# Patient Record
Sex: FEMALE | Race: WHITE | NOT HISPANIC OR LATINO
[De-identification: names, ages, dates, MRNs, and addresses within clinical notes are randomized per-mention and may not be internally consistent; named-entity substitution may affect disease eponyms.]

---

## 2017-04-12 ENCOUNTER — APPOINTMENT (OUTPATIENT)
Dept: ELECTROPHYSIOLOGY | Facility: CLINIC | Age: 77
End: 2017-04-12

## 2017-04-12 VITALS
HEIGHT: 67 IN | HEART RATE: 73 BPM | WEIGHT: 204 LBS | BODY MASS INDEX: 32.02 KG/M2 | DIASTOLIC BLOOD PRESSURE: 91 MMHG | SYSTOLIC BLOOD PRESSURE: 144 MMHG

## 2017-07-18 ENCOUNTER — APPOINTMENT (OUTPATIENT)
Dept: ELECTROPHYSIOLOGY | Facility: CLINIC | Age: 77
End: 2017-07-18

## 2017-07-18 VITALS
BODY MASS INDEX: 32.18 KG/M2 | HEIGHT: 67 IN | WEIGHT: 205 LBS | SYSTOLIC BLOOD PRESSURE: 143 MMHG | DIASTOLIC BLOOD PRESSURE: 96 MMHG | HEART RATE: 77 BPM

## 2017-09-26 ENCOUNTER — APPOINTMENT (OUTPATIENT)
Dept: ELECTROPHYSIOLOGY | Facility: CLINIC | Age: 77
End: 2017-09-26
Payer: MEDICARE

## 2017-09-26 VITALS
WEIGHT: 205 LBS | HEIGHT: 67 IN | HEART RATE: 65 BPM | BODY MASS INDEX: 32.18 KG/M2 | DIASTOLIC BLOOD PRESSURE: 69 MMHG | SYSTOLIC BLOOD PRESSURE: 131 MMHG

## 2017-09-26 PROCEDURE — 93280 PM DEVICE PROGR EVAL DUAL: CPT

## 2017-10-12 ENCOUNTER — OUTPATIENT (OUTPATIENT)
Dept: OUTPATIENT SERVICES | Facility: HOSPITAL | Age: 77
LOS: 1 days | Discharge: HOME | End: 2017-10-12
Payer: MEDICARE

## 2017-10-12 VITALS
DIASTOLIC BLOOD PRESSURE: 67 MMHG | TEMPERATURE: 98 F | HEIGHT: 66.5 IN | SYSTOLIC BLOOD PRESSURE: 120 MMHG | RESPIRATION RATE: 18 BRPM | OXYGEN SATURATION: 96 % | WEIGHT: 218.04 LBS | HEART RATE: 74 BPM

## 2017-10-12 DIAGNOSIS — Z95.0 PRESENCE OF CARDIAC PACEMAKER: Chronic | ICD-10-CM

## 2017-10-12 DIAGNOSIS — Z45.018 ENCOUNTER FOR ADJUSTMENT AND MANAGEMENT OF OTHER PART OF CARDIAC PACEMAKER: ICD-10-CM

## 2017-10-12 DIAGNOSIS — I48.91 UNSPECIFIED ATRIAL FIBRILLATION: ICD-10-CM

## 2017-10-12 DIAGNOSIS — I10 ESSENTIAL (PRIMARY) HYPERTENSION: ICD-10-CM

## 2017-10-12 DIAGNOSIS — Z98.890 OTHER SPECIFIED POSTPROCEDURAL STATES: Chronic | ICD-10-CM

## 2017-10-12 DIAGNOSIS — I49.5 SICK SINUS SYNDROME: ICD-10-CM

## 2017-10-12 DIAGNOSIS — Z01.818 ENCOUNTER FOR OTHER PREPROCEDURAL EXAMINATION: ICD-10-CM

## 2017-10-12 LAB
ANION GAP SERPL CALC-SCNC: 7 MMOL/L — SIGNIFICANT CHANGE UP (ref 5–17)
BASOPHILS # BLD AUTO: 0.1 K/UL — SIGNIFICANT CHANGE UP (ref 0–0.2)
BASOPHILS NFR BLD AUTO: 1.5 % — SIGNIFICANT CHANGE UP (ref 0–2)
BUN SERPL-MCNC: 18 MG/DL — SIGNIFICANT CHANGE UP (ref 7–23)
CALCIUM SERPL-MCNC: 8.5 MG/DL — SIGNIFICANT CHANGE UP (ref 8.5–10.1)
CHLORIDE SERPL-SCNC: 105 MMOL/L — SIGNIFICANT CHANGE UP (ref 96–108)
CO2 SERPL-SCNC: 27 MMOL/L — SIGNIFICANT CHANGE UP (ref 22–31)
CREAT SERPL-MCNC: 1.03 MG/DL — SIGNIFICANT CHANGE UP (ref 0.5–1.3)
EOSINOPHIL # BLD AUTO: 0.1 K/UL — SIGNIFICANT CHANGE UP (ref 0–0.5)
EOSINOPHIL NFR BLD AUTO: 2 % — SIGNIFICANT CHANGE UP (ref 0–6)
GLUCOSE SERPL-MCNC: 209 MG/DL — HIGH (ref 70–99)
HCT VFR BLD CALC: 41.9 % — SIGNIFICANT CHANGE UP (ref 34.5–45)
HGB BLD-MCNC: 14 G/DL — SIGNIFICANT CHANGE UP (ref 11.5–15.5)
INR BLD: 2 RATIO — HIGH (ref 0.88–1.16)
LYMPHOCYTES # BLD AUTO: 1.3 K/UL — SIGNIFICANT CHANGE UP (ref 1–3.3)
LYMPHOCYTES # BLD AUTO: 23.6 % — SIGNIFICANT CHANGE UP (ref 13–44)
MCHC RBC-ENTMCNC: 30.8 PG — SIGNIFICANT CHANGE UP (ref 27–34)
MCHC RBC-ENTMCNC: 33.5 GM/DL — SIGNIFICANT CHANGE UP (ref 32–36)
MCV RBC AUTO: 92 FL — SIGNIFICANT CHANGE UP (ref 80–100)
MONOCYTES # BLD AUTO: 0.3 K/UL — SIGNIFICANT CHANGE UP (ref 0–0.9)
MONOCYTES NFR BLD AUTO: 6.1 % — SIGNIFICANT CHANGE UP (ref 2–14)
NEUTROPHILS # BLD AUTO: 3.8 K/UL — SIGNIFICANT CHANGE UP (ref 1.8–7.4)
NEUTROPHILS NFR BLD AUTO: 66.8 % — SIGNIFICANT CHANGE UP (ref 43–77)
PLATELET # BLD AUTO: 165 K/UL — SIGNIFICANT CHANGE UP (ref 150–400)
POTASSIUM SERPL-MCNC: 4 MMOL/L — SIGNIFICANT CHANGE UP (ref 3.5–5.3)
POTASSIUM SERPL-SCNC: 4 MMOL/L — SIGNIFICANT CHANGE UP (ref 3.5–5.3)
PROTHROM AB SERPL-ACNC: 21.9 SEC — HIGH (ref 9.8–12.7)
RBC # BLD: 4.55 M/UL — SIGNIFICANT CHANGE UP (ref 3.8–5.2)
RBC # FLD: 11.7 % — SIGNIFICANT CHANGE UP (ref 10.3–14.5)
SODIUM SERPL-SCNC: 139 MMOL/L — SIGNIFICANT CHANGE UP (ref 135–145)
WBC # BLD: 5.7 K/UL — SIGNIFICANT CHANGE UP (ref 3.8–10.5)
WBC # FLD AUTO: 5.7 K/UL — SIGNIFICANT CHANGE UP (ref 3.8–10.5)

## 2017-10-12 PROCEDURE — 71020: CPT | Mod: 26

## 2017-10-12 PROCEDURE — 93010 ELECTROCARDIOGRAM REPORT: CPT

## 2017-10-12 RX ORDER — DIGOXIN 250 MCG
1 TABLET ORAL
Qty: 0 | Refills: 0 | COMMUNITY

## 2017-10-12 NOTE — H&P PST ADULT - ASSESSMENT
77 years old female present to PST prior to PPM generator change with Dr. Rudy Castorena. Instructions as per cardiology.  Plan  1. Use E-Z sponge as directed  2. Use Mupirocin as directed.  3. CBC, BMP, PT/PTT/INR,  MRSA sent to lab  4. EKG and CXR done

## 2017-10-12 NOTE — H&P PST ADULT - HISTORY OF PRESENT ILLNESS
77 years old female Atrial Fibrillation. She had device placed in 2008. Device originally checked every 6 months. Presently interrogated every 2 months. Battery at the end of life. Planned Pacemaker generator change.

## 2017-10-12 NOTE — H&P PST ADULT - PMH
Chronic atrial fibrillation    Chronic midline low back pain without sciatica    Glaucoma of both eyes, unspecified glaucoma type    Hyperlipidemia, unspecified hyperlipidemia type    Hypertension, unspecified type    Hypothyroidism, unspecified type    Obesity, unspecified classification, unspecified obesity type, unspecified whether serious comorbidity present    Pacemaker at end of battery life

## 2017-10-13 LAB
MRSA PCR RESULT.: SIGNIFICANT CHANGE UP
S AUREUS DNA NOSE QL NAA+PROBE: SIGNIFICANT CHANGE UP

## 2017-10-20 ENCOUNTER — OUTPATIENT (OUTPATIENT)
Dept: OUTPATIENT SERVICES | Facility: HOSPITAL | Age: 77
LOS: 1 days | Discharge: HOME | End: 2017-10-20
Payer: MEDICARE

## 2017-10-20 VITALS
HEART RATE: 80 BPM | DIASTOLIC BLOOD PRESSURE: 60 MMHG | RESPIRATION RATE: 20 BRPM | HEIGHT: 66.5 IN | SYSTOLIC BLOOD PRESSURE: 126 MMHG | OXYGEN SATURATION: 97 % | TEMPERATURE: 98 F | WEIGHT: 207.9 LBS

## 2017-10-20 VITALS
HEART RATE: 78 BPM | OXYGEN SATURATION: 97 % | DIASTOLIC BLOOD PRESSURE: 75 MMHG | SYSTOLIC BLOOD PRESSURE: 127 MMHG | RESPIRATION RATE: 20 BRPM

## 2017-10-20 DIAGNOSIS — Z98.890 OTHER SPECIFIED POSTPROCEDURAL STATES: Chronic | ICD-10-CM

## 2017-10-20 DIAGNOSIS — Z45.018 ENCOUNTER FOR ADJUSTMENT AND MANAGEMENT OF OTHER PART OF CARDIAC PACEMAKER: ICD-10-CM

## 2017-10-20 DIAGNOSIS — I49.5 SICK SINUS SYNDROME: ICD-10-CM

## 2017-10-20 DIAGNOSIS — Z95.0 PRESENCE OF CARDIAC PACEMAKER: Chronic | ICD-10-CM

## 2017-10-20 DIAGNOSIS — I10 ESSENTIAL (PRIMARY) HYPERTENSION: ICD-10-CM

## 2017-10-20 DIAGNOSIS — I48.91 UNSPECIFIED ATRIAL FIBRILLATION: ICD-10-CM

## 2017-10-20 PROCEDURE — 33228 REMV&REPLC PM GEN DUAL LEAD: CPT

## 2017-10-20 RX ORDER — CEFAZOLIN SODIUM 1 G
2000 VIAL (EA) INJECTION ONCE
Qty: 0 | Refills: 0 | Status: COMPLETED | OUTPATIENT
Start: 2017-10-20 | End: 2017-10-20

## 2017-10-20 RX ADMIN — Medication 100 MILLIGRAM(S): at 10:42

## 2017-10-20 NOTE — PACU DISCHARGE NOTE - COMMENTS
Discharged home with family member. Pt. given written and oral discharge instructions. Pt.and family verbalize understanding of same.

## 2017-10-23 ENCOUNTER — APPOINTMENT (OUTPATIENT)
Dept: ELECTROPHYSIOLOGY | Facility: CLINIC | Age: 77
End: 2017-10-23
Payer: MEDICARE

## 2017-10-23 VITALS
WEIGHT: 205 LBS | BODY MASS INDEX: 32.56 KG/M2 | SYSTOLIC BLOOD PRESSURE: 137 MMHG | HEIGHT: 66.6 IN | DIASTOLIC BLOOD PRESSURE: 90 MMHG | HEART RATE: 66 BPM

## 2017-10-23 PROCEDURE — 99024 POSTOP FOLLOW-UP VISIT: CPT

## 2017-11-03 ENCOUNTER — APPOINTMENT (OUTPATIENT)
Dept: ELECTROPHYSIOLOGY | Facility: CLINIC | Age: 77
End: 2017-11-03
Payer: MEDICARE

## 2017-11-03 PROCEDURE — 99024 POSTOP FOLLOW-UP VISIT: CPT

## 2017-12-15 ENCOUNTER — APPOINTMENT (OUTPATIENT)
Dept: ELECTROPHYSIOLOGY | Facility: CLINIC | Age: 77
End: 2017-12-15
Payer: MEDICARE

## 2017-12-15 VITALS
DIASTOLIC BLOOD PRESSURE: 86 MMHG | BODY MASS INDEX: 32.95 KG/M2 | SYSTOLIC BLOOD PRESSURE: 156 MMHG | HEART RATE: 62 BPM | HEIGHT: 66 IN | WEIGHT: 205 LBS

## 2017-12-15 PROCEDURE — 99024 POSTOP FOLLOW-UP VISIT: CPT

## 2018-03-16 ENCOUNTER — APPOINTMENT (OUTPATIENT)
Dept: ELECTROPHYSIOLOGY | Facility: CLINIC | Age: 78
End: 2018-03-16
Payer: MEDICARE

## 2018-03-16 VITALS
BODY MASS INDEX: 31.8 KG/M2 | DIASTOLIC BLOOD PRESSURE: 91 MMHG | HEIGHT: 67.5 IN | WEIGHT: 205 LBS | SYSTOLIC BLOOD PRESSURE: 124 MMHG | HEART RATE: 75 BPM

## 2018-03-16 PROCEDURE — 93279 PRGRMG DEV EVAL PM/LDLS PM: CPT

## 2018-06-22 ENCOUNTER — APPOINTMENT (OUTPATIENT)
Dept: ELECTROPHYSIOLOGY | Facility: CLINIC | Age: 78
End: 2018-06-22
Payer: MEDICARE

## 2018-06-22 VITALS
WEIGHT: 205 LBS | BODY MASS INDEX: 31.8 KG/M2 | DIASTOLIC BLOOD PRESSURE: 77 MMHG | HEIGHT: 67.5 IN | HEART RATE: 58 BPM | SYSTOLIC BLOOD PRESSURE: 115 MMHG

## 2018-06-22 PROCEDURE — 93279 PRGRMG DEV EVAL PM/LDLS PM: CPT

## 2018-09-24 ENCOUNTER — APPOINTMENT (OUTPATIENT)
Dept: ELECTROPHYSIOLOGY | Facility: CLINIC | Age: 78
End: 2018-09-24
Payer: MEDICARE

## 2018-09-24 PROCEDURE — 93296 REM INTERROG EVL PM/IDS: CPT

## 2018-09-24 PROCEDURE — 93294 REM INTERROG EVL PM/LDLS PM: CPT

## 2019-01-07 ENCOUNTER — APPOINTMENT (OUTPATIENT)
Dept: ELECTROPHYSIOLOGY | Facility: CLINIC | Age: 79
End: 2019-01-07
Payer: MEDICARE

## 2019-01-07 PROCEDURE — 93294 REM INTERROG EVL PM/LDLS PM: CPT

## 2019-01-07 PROCEDURE — 93296 REM INTERROG EVL PM/IDS: CPT

## 2019-04-08 ENCOUNTER — APPOINTMENT (OUTPATIENT)
Dept: ELECTROPHYSIOLOGY | Facility: CLINIC | Age: 79
End: 2019-04-08
Payer: MEDICARE

## 2019-04-08 PROCEDURE — 93294 REM INTERROG EVL PM/LDLS PM: CPT

## 2019-04-08 PROCEDURE — 93296 REM INTERROG EVL PM/IDS: CPT

## 2019-06-28 ENCOUNTER — APPOINTMENT (OUTPATIENT)
Dept: ELECTROPHYSIOLOGY | Facility: CLINIC | Age: 79
End: 2019-06-28
Payer: MEDICARE

## 2019-06-28 VITALS
WEIGHT: 190 LBS | BODY MASS INDEX: 29.47 KG/M2 | HEART RATE: 60 BPM | HEIGHT: 67.5 IN | DIASTOLIC BLOOD PRESSURE: 80 MMHG | SYSTOLIC BLOOD PRESSURE: 132 MMHG

## 2019-06-28 PROBLEM — E78.5 HYPERLIPIDEMIA, UNSPECIFIED: Chronic | Status: ACTIVE | Noted: 2017-10-12

## 2019-06-28 PROBLEM — H40.9 UNSPECIFIED GLAUCOMA: Chronic | Status: ACTIVE | Noted: 2017-10-12

## 2019-06-28 PROBLEM — E03.9 HYPOTHYROIDISM, UNSPECIFIED: Chronic | Status: ACTIVE | Noted: 2017-10-12

## 2019-06-28 PROBLEM — I48.2 CHRONIC ATRIAL FIBRILLATION: Chronic | Status: ACTIVE | Noted: 2017-10-12

## 2019-06-28 PROBLEM — Z45.010 ENCOUNTER FOR CHECKING AND TESTING OF CARDIAC PACEMAKER PULSE GENERATOR [BATTERY]: Chronic | Status: ACTIVE | Noted: 2017-10-12

## 2019-06-28 PROBLEM — M54.5 LOW BACK PAIN: Chronic | Status: ACTIVE | Noted: 2017-10-12

## 2019-06-28 PROBLEM — E66.9 OBESITY, UNSPECIFIED: Chronic | Status: ACTIVE | Noted: 2017-10-12

## 2019-06-28 PROBLEM — I10 ESSENTIAL (PRIMARY) HYPERTENSION: Chronic | Status: ACTIVE | Noted: 2017-10-12

## 2019-06-28 PROCEDURE — 93279 PRGRMG DEV EVAL PM/LDLS PM: CPT

## 2019-10-01 ENCOUNTER — APPOINTMENT (OUTPATIENT)
Dept: ELECTROPHYSIOLOGY | Facility: CLINIC | Age: 79
End: 2019-10-01
Payer: MEDICARE

## 2019-10-01 PROCEDURE — 93294 REM INTERROG EVL PM/LDLS PM: CPT

## 2019-10-01 PROCEDURE — 93296 REM INTERROG EVL PM/IDS: CPT

## 2020-01-07 ENCOUNTER — APPOINTMENT (OUTPATIENT)
Dept: ELECTROPHYSIOLOGY | Facility: CLINIC | Age: 80
End: 2020-01-07
Payer: MEDICARE

## 2020-01-07 PROCEDURE — 93296 REM INTERROG EVL PM/IDS: CPT

## 2020-01-07 PROCEDURE — 93294 REM INTERROG EVL PM/LDLS PM: CPT

## 2020-04-14 ENCOUNTER — APPOINTMENT (OUTPATIENT)
Dept: ELECTROPHYSIOLOGY | Facility: CLINIC | Age: 80
End: 2020-04-14
Payer: MEDICARE

## 2020-04-14 PROCEDURE — 93294 REM INTERROG EVL PM/LDLS PM: CPT

## 2020-04-14 PROCEDURE — 93296 REM INTERROG EVL PM/IDS: CPT

## 2020-06-30 RX ORDER — DIGOXIN 125 UG/1
125 TABLET ORAL DAILY
Refills: 0 | Status: ACTIVE | COMMUNITY

## 2020-06-30 RX ORDER — LEVOTHYROXINE SODIUM 0.05 MG/1
50 TABLET ORAL DAILY
Refills: 0 | Status: ACTIVE | COMMUNITY

## 2020-06-30 RX ORDER — ASPIRIN 81 MG
81 TABLET, DELAYED RELEASE (ENTERIC COATED) ORAL DAILY
Refills: 0 | Status: ACTIVE | COMMUNITY

## 2020-06-30 RX ORDER — CARVEDILOL 25 MG/1
25 TABLET, FILM COATED ORAL TWICE DAILY
Refills: 0 | Status: ACTIVE | COMMUNITY

## 2020-06-30 RX ORDER — LISINOPRIL 10 MG/1
10 TABLET ORAL DAILY
Refills: 0 | Status: ACTIVE | COMMUNITY

## 2020-06-30 RX ORDER — FUROSEMIDE 20 MG/1
20 TABLET ORAL DAILY
Refills: 0 | Status: ACTIVE | COMMUNITY

## 2020-06-30 RX ORDER — ISOSORBIDE MONONITRATE 20 MG/1
TABLET ORAL
Refills: 0 | Status: ACTIVE | COMMUNITY

## 2020-06-30 RX ORDER — WARFARIN 3 MG/1
3 TABLET ORAL
Refills: 0 | Status: ACTIVE | COMMUNITY

## 2020-06-30 RX ORDER — SPIRONOLACTONE 25 MG/1
25 TABLET ORAL DAILY
Refills: 0 | Status: ACTIVE | COMMUNITY

## 2020-06-30 RX ORDER — CEPHALEXIN 500 MG/1
500 CAPSULE ORAL EVERY 8 HOURS
Qty: 2 | Refills: 0 | Status: DISCONTINUED | COMMUNITY
Start: 2017-10-20 | End: 2020-06-30

## 2020-06-30 RX ORDER — ATORVASTATIN CALCIUM 40 MG/1
40 TABLET, FILM COATED ORAL
Refills: 0 | Status: ACTIVE | COMMUNITY

## 2020-07-01 ENCOUNTER — APPOINTMENT (OUTPATIENT)
Dept: ELECTROPHYSIOLOGY | Facility: CLINIC | Age: 80
End: 2020-07-01
Payer: MEDICARE

## 2020-07-01 VITALS
HEART RATE: 78 BPM | OXYGEN SATURATION: 98 % | DIASTOLIC BLOOD PRESSURE: 83 MMHG | WEIGHT: 180 LBS | SYSTOLIC BLOOD PRESSURE: 139 MMHG | HEIGHT: 67 IN | BODY MASS INDEX: 28.25 KG/M2

## 2020-07-01 PROCEDURE — 93280 PM DEVICE PROGR EVAL DUAL: CPT

## 2020-09-24 ENCOUNTER — APPOINTMENT (OUTPATIENT)
Dept: ELECTROPHYSIOLOGY | Facility: CLINIC | Age: 80
End: 2020-09-24
Payer: MEDICARE

## 2020-09-24 PROCEDURE — 93294 REM INTERROG EVL PM/LDLS PM: CPT

## 2020-09-24 PROCEDURE — 93296 REM INTERROG EVL PM/IDS: CPT

## 2020-09-30 ENCOUNTER — APPOINTMENT (OUTPATIENT)
Dept: ELECTROPHYSIOLOGY | Facility: CLINIC | Age: 80
End: 2020-09-30

## 2021-01-12 ENCOUNTER — APPOINTMENT (OUTPATIENT)
Dept: ELECTROPHYSIOLOGY | Facility: CLINIC | Age: 81
End: 2021-01-12
Payer: MEDICARE

## 2021-01-12 PROCEDURE — 93294 REM INTERROG EVL PM/LDLS PM: CPT

## 2021-01-12 PROCEDURE — 93296 REM INTERROG EVL PM/IDS: CPT

## 2021-04-13 ENCOUNTER — APPOINTMENT (OUTPATIENT)
Dept: ELECTROPHYSIOLOGY | Facility: CLINIC | Age: 81
End: 2021-04-13
Payer: MEDICARE

## 2021-04-13 ENCOUNTER — NON-APPOINTMENT (OUTPATIENT)
Age: 81
End: 2021-04-13

## 2021-04-13 PROCEDURE — 93294 REM INTERROG EVL PM/LDLS PM: CPT

## 2021-04-13 PROCEDURE — 93296 REM INTERROG EVL PM/IDS: CPT

## 2021-07-01 ENCOUNTER — APPOINTMENT (OUTPATIENT)
Dept: ELECTROPHYSIOLOGY | Facility: CLINIC | Age: 81
End: 2021-07-01
Payer: MEDICARE

## 2021-07-01 VITALS
DIASTOLIC BLOOD PRESSURE: 83 MMHG | RESPIRATION RATE: 14 BRPM | HEIGHT: 67 IN | OXYGEN SATURATION: 99 % | SYSTOLIC BLOOD PRESSURE: 127 MMHG | WEIGHT: 166 LBS | BODY MASS INDEX: 26.06 KG/M2

## 2021-07-01 PROCEDURE — 93280 PM DEVICE PROGR EVAL DUAL: CPT

## 2021-10-03 ENCOUNTER — APPOINTMENT (OUTPATIENT)
Dept: ELECTROPHYSIOLOGY | Facility: CLINIC | Age: 81
End: 2021-10-03
Payer: MEDICARE

## 2021-10-04 ENCOUNTER — NON-APPOINTMENT (OUTPATIENT)
Age: 81
End: 2021-10-04

## 2021-10-04 PROCEDURE — 93296 REM INTERROG EVL PM/IDS: CPT

## 2021-10-04 PROCEDURE — 93294 REM INTERROG EVL PM/LDLS PM: CPT

## 2022-01-03 ENCOUNTER — APPOINTMENT (OUTPATIENT)
Dept: ELECTROPHYSIOLOGY | Facility: CLINIC | Age: 82
End: 2022-01-03
Payer: MEDICARE

## 2022-01-04 ENCOUNTER — NON-APPOINTMENT (OUTPATIENT)
Age: 82
End: 2022-01-04

## 2022-01-04 PROCEDURE — 93296 REM INTERROG EVL PM/IDS: CPT | Mod: NC

## 2022-01-04 PROCEDURE — 93294 REM INTERROG EVL PM/LDLS PM: CPT | Mod: NC

## 2022-01-28 NOTE — ASU PREOP CHECKLIST - HEART RATE (BEATS/MIN)
[Postmenopausal] : The patient is postmenopausal [Total Preg ___] : G[unfilled] [Live Births ___] : P[unfilled]  [Age At Live Birth ___] : Age at live birth: [unfilled] [Menarche Age ____] : age at menarche was [unfilled] [Menopause Age____] : age at menopause was [unfilled] [Definite ___ (Date)] : the last menstrual period was [unfilled] [History of Hormone Replacement Treatment] : has no history of hormone replacement treatment 80

## 2022-04-04 ENCOUNTER — FORM ENCOUNTER (OUTPATIENT)
Age: 82
End: 2022-04-04

## 2022-04-05 ENCOUNTER — NON-APPOINTMENT (OUTPATIENT)
Age: 82
End: 2022-04-05

## 2022-04-05 ENCOUNTER — APPOINTMENT (OUTPATIENT)
Dept: ELECTROPHYSIOLOGY | Facility: CLINIC | Age: 82
End: 2022-04-05
Payer: MEDICARE

## 2022-04-05 PROCEDURE — 93294 REM INTERROG EVL PM/LDLS PM: CPT

## 2022-04-05 PROCEDURE — 93296 REM INTERROG EVL PM/IDS: CPT

## 2022-07-01 ENCOUNTER — APPOINTMENT (OUTPATIENT)
Dept: ELECTROPHYSIOLOGY | Facility: CLINIC | Age: 82
End: 2022-07-01

## 2022-07-01 ENCOUNTER — NON-APPOINTMENT (OUTPATIENT)
Age: 82
End: 2022-07-01

## 2022-07-01 VITALS
DIASTOLIC BLOOD PRESSURE: 80 MMHG | OXYGEN SATURATION: 98 % | SYSTOLIC BLOOD PRESSURE: 120 MMHG | RESPIRATION RATE: 14 BRPM | HEIGHT: 67 IN | HEART RATE: 81 BPM | WEIGHT: 166 LBS | BODY MASS INDEX: 26.06 KG/M2

## 2022-07-01 DIAGNOSIS — I49.5 SICK SINUS SYNDROME: ICD-10-CM

## 2022-07-01 PROCEDURE — 93279 PRGRMG DEV EVAL PM/LDLS PM: CPT

## 2022-10-03 ENCOUNTER — APPOINTMENT (OUTPATIENT)
Dept: ELECTROPHYSIOLOGY | Facility: CLINIC | Age: 82
End: 2022-10-03

## 2022-10-04 ENCOUNTER — NON-APPOINTMENT (OUTPATIENT)
Age: 82
End: 2022-10-04

## 2022-10-04 PROCEDURE — 93294 REM INTERROG EVL PM/LDLS PM: CPT

## 2022-10-04 PROCEDURE — 93296 REM INTERROG EVL PM/IDS: CPT

## 2022-12-20 ENCOUNTER — FORM ENCOUNTER (OUTPATIENT)
Age: 82
End: 2022-12-20

## 2023-01-02 ENCOUNTER — APPOINTMENT (OUTPATIENT)
Dept: ELECTROPHYSIOLOGY | Facility: CLINIC | Age: 83
End: 2023-01-02
Payer: MEDICARE

## 2023-01-03 ENCOUNTER — NON-APPOINTMENT (OUTPATIENT)
Age: 83
End: 2023-01-03

## 2023-01-03 PROCEDURE — 93294 REM INTERROG EVL PM/LDLS PM: CPT

## 2023-01-03 PROCEDURE — 93296 REM INTERROG EVL PM/IDS: CPT

## 2023-03-02 ENCOUNTER — FORM ENCOUNTER (OUTPATIENT)
Age: 83
End: 2023-03-02

## 2023-04-03 ENCOUNTER — APPOINTMENT (OUTPATIENT)
Dept: ELECTROPHYSIOLOGY | Facility: CLINIC | Age: 83
End: 2023-04-03
Payer: MEDICARE

## 2023-04-04 ENCOUNTER — NON-APPOINTMENT (OUTPATIENT)
Age: 83
End: 2023-04-04

## 2023-04-04 PROCEDURE — 93296 REM INTERROG EVL PM/IDS: CPT

## 2023-04-04 PROCEDURE — 93294 REM INTERROG EVL PM/LDLS PM: CPT

## 2023-07-06 ENCOUNTER — APPOINTMENT (OUTPATIENT)
Dept: ELECTROPHYSIOLOGY | Facility: CLINIC | Age: 83
End: 2023-07-06
Payer: MEDICARE

## 2023-07-06 ENCOUNTER — NON-APPOINTMENT (OUTPATIENT)
Age: 83
End: 2023-07-06

## 2023-07-06 VITALS
SYSTOLIC BLOOD PRESSURE: 110 MMHG | DIASTOLIC BLOOD PRESSURE: 72 MMHG | RESPIRATION RATE: 16 BRPM | HEART RATE: 78 BPM | OXYGEN SATURATION: 100 %

## 2023-07-06 DIAGNOSIS — I48.20 CHRONIC ATRIAL FIBRILLATION, UNSP: ICD-10-CM

## 2023-07-06 DIAGNOSIS — Z95.0 PRESENCE OF CARDIAC PACEMAKER: ICD-10-CM

## 2023-07-06 PROCEDURE — 93279 PRGRMG DEV EVAL PM/LDLS PM: CPT

## 2023-07-06 NOTE — H&P PST ADULT - NSALCOHOLAMT_GEN_A_CORE_SD
Pt BIB EMS after Pt passed out while playing Basketball. Pt denied Hitting head, she sat down before loss of consciousness. Pt sleepy upon arrival.  Answering to verbal questions. No PHX. NKA. IUTD.
1-2 drinks

## 2023-10-04 ENCOUNTER — APPOINTMENT (OUTPATIENT)
Dept: ELECTROPHYSIOLOGY | Facility: CLINIC | Age: 83
End: 2023-10-04
Payer: MEDICARE

## 2023-10-05 ENCOUNTER — NON-APPOINTMENT (OUTPATIENT)
Age: 83
End: 2023-10-05

## 2023-10-05 PROCEDURE — 93294 REM INTERROG EVL PM/LDLS PM: CPT

## 2023-10-05 PROCEDURE — 93296 REM INTERROG EVL PM/IDS: CPT

## 2023-10-10 ENCOUNTER — INPATIENT (INPATIENT)
Facility: HOSPITAL | Age: 83
LOS: 12 days | Discharge: NURSING, REHAB OR LONG-TERM CARE | DRG: 871 | End: 2023-10-23
Attending: FAMILY MEDICINE | Admitting: INTERNAL MEDICINE
Payer: MEDICARE

## 2023-10-10 VITALS
RESPIRATION RATE: 18 BRPM | SYSTOLIC BLOOD PRESSURE: 55 MMHG | TEMPERATURE: 98 F | HEART RATE: 46 BPM | OXYGEN SATURATION: 96 % | WEIGHT: 169.98 LBS | HEIGHT: 64 IN | DIASTOLIC BLOOD PRESSURE: 40 MMHG

## 2023-10-10 DIAGNOSIS — Z98.890 OTHER SPECIFIED POSTPROCEDURAL STATES: Chronic | ICD-10-CM

## 2023-10-10 DIAGNOSIS — F03.90 UNSPECIFIED DEMENTIA WITHOUT BEHAVIORAL DISTURBANCE: ICD-10-CM

## 2023-10-10 DIAGNOSIS — K55.9 VASCULAR DISORDER OF INTESTINE, UNSPECIFIED: ICD-10-CM

## 2023-10-10 DIAGNOSIS — Z95.0 PRESENCE OF CARDIAC PACEMAKER: Chronic | ICD-10-CM

## 2023-10-10 LAB
ADD ON TEST-SPECIMEN IN LAB: SIGNIFICANT CHANGE UP
ALBUMIN SERPL ELPH-MCNC: 2.6 G/DL — LOW (ref 3.3–5)
ALP SERPL-CCNC: 82 U/L — SIGNIFICANT CHANGE UP (ref 40–120)
ALT FLD-CCNC: 36 U/L — SIGNIFICANT CHANGE UP (ref 12–78)
ANION GAP SERPL CALC-SCNC: 12 MMOL/L — SIGNIFICANT CHANGE UP (ref 5–17)
APPEARANCE UR: ABNORMAL
APTT BLD: 45.6 SEC — HIGH (ref 24.5–35.6)
AST SERPL-CCNC: 37 U/L — SIGNIFICANT CHANGE UP (ref 15–37)
BACTERIA # UR AUTO: ABNORMAL /HPF
BASOPHILS # BLD AUTO: 0 K/UL — SIGNIFICANT CHANGE UP (ref 0–0.2)
BASOPHILS NFR BLD AUTO: 0 % — SIGNIFICANT CHANGE UP (ref 0–2)
BILIRUB SERPL-MCNC: 0.8 MG/DL — SIGNIFICANT CHANGE UP (ref 0.2–1.2)
BILIRUB UR-MCNC: ABNORMAL
BUN SERPL-MCNC: 60 MG/DL — HIGH (ref 7–23)
CALCIUM SERPL-MCNC: 8.7 MG/DL — SIGNIFICANT CHANGE UP (ref 8.5–10.1)
CAST: 24 /LPF — HIGH (ref 0–4)
CHLORIDE SERPL-SCNC: 96 MMOL/L — SIGNIFICANT CHANGE UP (ref 96–108)
CO2 SERPL-SCNC: 19 MMOL/L — LOW (ref 22–31)
COLOR SPEC: SIGNIFICANT CHANGE UP
CREAT SERPL-MCNC: 1.78 MG/DL — HIGH (ref 0.5–1.3)
DIFF PNL FLD: ABNORMAL
DIGOXIN SERPL-MCNC: 1.08 NG/ML — SIGNIFICANT CHANGE UP (ref 0.8–2)
EGFR: 28 ML/MIN/1.73M2 — LOW
EOSINOPHIL # BLD AUTO: 0 K/UL — SIGNIFICANT CHANGE UP (ref 0–0.5)
EOSINOPHIL NFR BLD AUTO: 0 % — SIGNIFICANT CHANGE UP (ref 0–6)
GLUCOSE BLDC GLUCOMTR-MCNC: 118 MG/DL — HIGH (ref 70–99)
GLUCOSE SERPL-MCNC: 142 MG/DL — HIGH (ref 70–99)
GLUCOSE UR QL: NEGATIVE MG/DL — SIGNIFICANT CHANGE UP
GRAN CASTS # UR COMP ASSIST: PRESENT
HCT VFR BLD CALC: 35.5 % — SIGNIFICANT CHANGE UP (ref 34.5–45)
HGB BLD-MCNC: 12.3 G/DL — SIGNIFICANT CHANGE UP (ref 11.5–15.5)
HYALINE CASTS # UR AUTO: PRESENT
INR BLD: 13.05 RATIO — CRITICAL HIGH (ref 0.85–1.18)
KETONES UR-MCNC: ABNORMAL MG/DL
LACTATE SERPL-SCNC: 1.5 MMOL/L — SIGNIFICANT CHANGE UP (ref 0.7–2)
LACTATE SERPL-SCNC: 3.1 MMOL/L — HIGH (ref 0.7–2)
LEUKOCYTE ESTERASE UR-ACNC: ABNORMAL
LIDOCAIN IGE QN: 15 U/L — SIGNIFICANT CHANGE UP (ref 13–75)
LYMPHOCYTES # BLD AUTO: 0.76 K/UL — LOW (ref 1–3.3)
LYMPHOCYTES # BLD AUTO: 5 % — LOW (ref 13–44)
MANUAL SMEAR VERIFICATION: SIGNIFICANT CHANGE UP
MCHC RBC-ENTMCNC: 31.4 PG — SIGNIFICANT CHANGE UP (ref 27–34)
MCHC RBC-ENTMCNC: 34.6 GM/DL — SIGNIFICANT CHANGE UP (ref 32–36)
MCV RBC AUTO: 90.6 FL — SIGNIFICANT CHANGE UP (ref 80–100)
METAMYELOCYTES # FLD: 5 % — HIGH (ref 0–0)
MONOCYTES # BLD AUTO: 0.91 K/UL — HIGH (ref 0–0.9)
MONOCYTES NFR BLD AUTO: 6 % — SIGNIFICANT CHANGE UP (ref 2–14)
NEUTROPHILS # BLD AUTO: 12.8 K/UL — HIGH (ref 1.8–7.4)
NEUTROPHILS NFR BLD AUTO: 66 % — SIGNIFICANT CHANGE UP (ref 43–77)
NEUTS BAND # BLD: 18 % — HIGH (ref 0–8)
NITRITE UR-MCNC: NEGATIVE — SIGNIFICANT CHANGE UP
NRBC # BLD: 0 /100 — SIGNIFICANT CHANGE UP (ref 0–0)
NRBC # BLD: SIGNIFICANT CHANGE UP /100 WBCS (ref 0–0)
OVALOCYTES BLD QL SMEAR: SLIGHT — SIGNIFICANT CHANGE UP
PH UR: 5.5 — SIGNIFICANT CHANGE UP (ref 5–8)
PLAT MORPH BLD: NORMAL — SIGNIFICANT CHANGE UP
PLATELET # BLD AUTO: 351 K/UL — SIGNIFICANT CHANGE UP (ref 150–400)
POIKILOCYTOSIS BLD QL AUTO: SLIGHT — SIGNIFICANT CHANGE UP
POTASSIUM SERPL-MCNC: 4.1 MMOL/L — SIGNIFICANT CHANGE UP (ref 3.5–5.3)
POTASSIUM SERPL-SCNC: 4.1 MMOL/L — SIGNIFICANT CHANGE UP (ref 3.5–5.3)
PROT SERPL-MCNC: 5.7 GM/DL — LOW (ref 6–8.3)
PROT UR-MCNC: 30 MG/DL
PROTHROM AB SERPL-ACNC: 136.8 SEC — HIGH (ref 9.5–13)
RAPID RVP RESULT: DETECTED
RBC # BLD: 3.92 M/UL — SIGNIFICANT CHANGE UP (ref 3.8–5.2)
RBC # FLD: 13.3 % — SIGNIFICANT CHANGE UP (ref 10.3–14.5)
RBC BLD AUTO: ABNORMAL
RBC CASTS # UR COMP ASSIST: 12 /HPF — HIGH (ref 0–4)
SARS-COV-2 RNA SPEC QL NAA+PROBE: DETECTED
SCHISTOCYTES BLD QL AUTO: SLIGHT — SIGNIFICANT CHANGE UP
SODIUM SERPL-SCNC: 127 MMOL/L — LOW (ref 135–145)
SP GR SPEC: 1.02 — SIGNIFICANT CHANGE UP (ref 1–1.03)
SQUAMOUS # UR AUTO: 1 /HPF — SIGNIFICANT CHANGE UP (ref 0–5)
TROPONIN I, HIGH SENSITIVITY RESULT: 31.92 NG/L — SIGNIFICANT CHANGE UP
UROBILINOGEN FLD QL: 1 MG/DL — SIGNIFICANT CHANGE UP (ref 0.2–1)
WBC # BLD: 15.24 K/UL — HIGH (ref 3.8–10.5)
WBC # FLD AUTO: 15.24 K/UL — HIGH (ref 3.8–10.5)
WBC UR QL: >998 /HPF — HIGH (ref 0–5)

## 2023-10-10 PROCEDURE — 71275 CT ANGIOGRAPHY CHEST: CPT

## 2023-10-10 PROCEDURE — 80053 COMPREHEN METABOLIC PANEL: CPT

## 2023-10-10 PROCEDURE — 85610 PROTHROMBIN TIME: CPT

## 2023-10-10 PROCEDURE — 93971 EXTREMITY STUDY: CPT | Mod: LT

## 2023-10-10 PROCEDURE — 83735 ASSAY OF MAGNESIUM: CPT

## 2023-10-10 PROCEDURE — 70450 CT HEAD/BRAIN W/O DYE: CPT | Mod: 26,MA

## 2023-10-10 PROCEDURE — 83605 ASSAY OF LACTIC ACID: CPT

## 2023-10-10 PROCEDURE — 85025 COMPLETE CBC W/AUTO DIFF WBC: CPT

## 2023-10-10 PROCEDURE — 36415 COLL VENOUS BLD VENIPUNCTURE: CPT

## 2023-10-10 PROCEDURE — 99223 1ST HOSP IP/OBS HIGH 75: CPT

## 2023-10-10 PROCEDURE — 85027 COMPLETE CBC AUTOMATED: CPT

## 2023-10-10 PROCEDURE — 93010 ELECTROCARDIOGRAM REPORT: CPT

## 2023-10-10 PROCEDURE — 97116 GAIT TRAINING THERAPY: CPT | Mod: GP

## 2023-10-10 PROCEDURE — 99291 CRITICAL CARE FIRST HOUR: CPT

## 2023-10-10 PROCEDURE — 74177 CT ABD & PELVIS W/CONTRAST: CPT

## 2023-10-10 PROCEDURE — 71260 CT THORAX DX C+: CPT | Mod: 26,MA

## 2023-10-10 PROCEDURE — 84100 ASSAY OF PHOSPHORUS: CPT

## 2023-10-10 PROCEDURE — 71045 X-RAY EXAM CHEST 1 VIEW: CPT

## 2023-10-10 PROCEDURE — 97163 PT EVAL HIGH COMPLEX 45 MIN: CPT | Mod: GP

## 2023-10-10 PROCEDURE — 85730 THROMBOPLASTIN TIME PARTIAL: CPT

## 2023-10-10 PROCEDURE — 97166 OT EVAL MOD COMPLEX 45 MIN: CPT | Mod: GO

## 2023-10-10 PROCEDURE — 87635 SARS-COV-2 COVID-19 AMP PRB: CPT

## 2023-10-10 PROCEDURE — 97535 SELF CARE MNGMENT TRAINING: CPT | Mod: GO

## 2023-10-10 PROCEDURE — 74177 CT ABD & PELVIS W/CONTRAST: CPT | Mod: 26,MA

## 2023-10-10 PROCEDURE — 87507 IADNA-DNA/RNA PROBE TQ 12-25: CPT

## 2023-10-10 PROCEDURE — 97530 THERAPEUTIC ACTIVITIES: CPT | Mod: GP

## 2023-10-10 PROCEDURE — 97110 THERAPEUTIC EXERCISES: CPT | Mod: GO

## 2023-10-10 PROCEDURE — 80048 BASIC METABOLIC PNL TOTAL CA: CPT

## 2023-10-10 PROCEDURE — 87493 C DIFF AMPLIFIED PROBE: CPT

## 2023-10-10 PROCEDURE — 72125 CT NECK SPINE W/O DYE: CPT | Mod: 26,MA

## 2023-10-10 PROCEDURE — 71045 X-RAY EXAM CHEST 1 VIEW: CPT | Mod: 26

## 2023-10-10 RX ORDER — CIPROFLOXACIN LACTATE 400MG/40ML
400 VIAL (ML) INTRAVENOUS EVERY 12 HOURS
Refills: 0 | Status: DISCONTINUED | OUTPATIENT
Start: 2023-10-10 | End: 2023-10-10

## 2023-10-10 RX ORDER — DIGOXIN 250 MCG
1 TABLET ORAL
Refills: 0 | DISCHARGE

## 2023-10-10 RX ORDER — ATORVASTATIN CALCIUM 80 MG/1
1 TABLET, FILM COATED ORAL
Refills: 0 | DISCHARGE

## 2023-10-10 RX ORDER — LEVOTHYROXINE SODIUM 125 MCG
50 TABLET ORAL DAILY
Refills: 0 | Status: DISCONTINUED | OUTPATIENT
Start: 2023-10-10 | End: 2023-10-23

## 2023-10-10 RX ORDER — QUETIAPINE FUMARATE 200 MG/1
12.5 TABLET, FILM COATED ORAL AT BEDTIME
Refills: 0 | Status: DISCONTINUED | OUTPATIENT
Start: 2023-10-10 | End: 2023-10-23

## 2023-10-10 RX ORDER — PIPERACILLIN AND TAZOBACTAM 4; .5 G/20ML; G/20ML
3.38 INJECTION, POWDER, LYOPHILIZED, FOR SOLUTION INTRAVENOUS EVERY 8 HOURS
Refills: 0 | Status: COMPLETED | OUTPATIENT
Start: 2023-10-10 | End: 2023-10-17

## 2023-10-10 RX ORDER — MEMANTINE HYDROCHLORIDE 10 MG/1
10 TABLET ORAL
Refills: 0 | Status: DISCONTINUED | OUTPATIENT
Start: 2023-10-10 | End: 2023-10-23

## 2023-10-10 RX ORDER — ASPIRIN/CALCIUM CARB/MAGNESIUM 324 MG
1 TABLET ORAL
Qty: 0 | Refills: 0 | DISCHARGE

## 2023-10-10 RX ORDER — METRONIDAZOLE 500 MG
500 TABLET ORAL ONCE
Refills: 0 | Status: COMPLETED | OUTPATIENT
Start: 2023-10-10 | End: 2023-10-10

## 2023-10-10 RX ORDER — SPIRONOLACTONE 25 MG/1
1 TABLET, FILM COATED ORAL
Refills: 0 | DISCHARGE

## 2023-10-10 RX ORDER — LEVOTHYROXINE SODIUM 125 MCG
1 TABLET ORAL
Qty: 0 | Refills: 0 | DISCHARGE

## 2023-10-10 RX ORDER — LATANOPROST 0.05 MG/ML
1 SOLUTION/ DROPS OPHTHALMIC; TOPICAL
Refills: 0 | DISCHARGE

## 2023-10-10 RX ORDER — ATORVASTATIN CALCIUM 80 MG/1
1 TABLET, FILM COATED ORAL
Qty: 0 | Refills: 0 | DISCHARGE

## 2023-10-10 RX ORDER — MEMANTINE HYDROCHLORIDE 10 MG/1
1 TABLET ORAL
Refills: 0 | DISCHARGE

## 2023-10-10 RX ORDER — SODIUM CHLORIDE 9 MG/ML
1000 INJECTION INTRAMUSCULAR; INTRAVENOUS; SUBCUTANEOUS ONCE
Refills: 0 | Status: DISCONTINUED | OUTPATIENT
Start: 2023-10-10 | End: 2023-10-18

## 2023-10-10 RX ORDER — PHYTONADIONE (VIT K1) 5 MG
5 TABLET ORAL ONCE
Refills: 0 | Status: COMPLETED | OUTPATIENT
Start: 2023-10-10 | End: 2023-10-10

## 2023-10-10 RX ORDER — LISINOPRIL 2.5 MG/1
1 TABLET ORAL
Refills: 0 | DISCHARGE

## 2023-10-10 RX ORDER — LATANOPROST 0.05 MG/ML
1 SOLUTION/ DROPS OPHTHALMIC; TOPICAL AT BEDTIME
Refills: 0 | Status: DISCONTINUED | OUTPATIENT
Start: 2023-10-10 | End: 2023-10-23

## 2023-10-10 RX ORDER — SODIUM CHLORIDE 9 MG/ML
1000 INJECTION INTRAMUSCULAR; INTRAVENOUS; SUBCUTANEOUS
Refills: 0 | Status: DISCONTINUED | OUTPATIENT
Start: 2023-10-10 | End: 2023-10-17

## 2023-10-10 RX ORDER — DIGOXIN 250 MCG
125 TABLET ORAL EVERY OTHER DAY
Refills: 0 | Status: DISCONTINUED | OUTPATIENT
Start: 2023-10-10 | End: 2023-10-23

## 2023-10-10 RX ORDER — CARVEDILOL PHOSPHATE 80 MG/1
1 CAPSULE, EXTENDED RELEASE ORAL
Qty: 0 | Refills: 0 | DISCHARGE

## 2023-10-10 RX ORDER — LANOLIN ALCOHOL/MO/W.PET/CERES
3 CREAM (GRAM) TOPICAL AT BEDTIME
Refills: 0 | Status: DISCONTINUED | OUTPATIENT
Start: 2023-10-10 | End: 2023-10-23

## 2023-10-10 RX ORDER — ACETAMINOPHEN 500 MG
2 TABLET ORAL
Refills: 0 | DISCHARGE

## 2023-10-10 RX ORDER — DONEPEZIL HYDROCHLORIDE 10 MG/1
1 TABLET, FILM COATED ORAL
Refills: 0 | DISCHARGE

## 2023-10-10 RX ORDER — SODIUM CHLORIDE 9 MG/ML
2500 INJECTION INTRAMUSCULAR; INTRAVENOUS; SUBCUTANEOUS ONCE
Refills: 0 | Status: COMPLETED | OUTPATIENT
Start: 2023-10-10 | End: 2023-10-10

## 2023-10-10 RX ORDER — FUROSEMIDE 40 MG
1 TABLET ORAL
Refills: 0 | DISCHARGE

## 2023-10-10 RX ORDER — LISINOPRIL 2.5 MG/1
1 TABLET ORAL
Qty: 0 | Refills: 0 | DISCHARGE

## 2023-10-10 RX ORDER — ONDANSETRON 8 MG/1
4 TABLET, FILM COATED ORAL EVERY 8 HOURS
Refills: 0 | Status: DISCONTINUED | OUTPATIENT
Start: 2023-10-10 | End: 2023-10-23

## 2023-10-10 RX ORDER — INFLUENZA VIRUS VACCINE 15; 15; 15; 15 UG/.5ML; UG/.5ML; UG/.5ML; UG/.5ML
0.7 SUSPENSION INTRAMUSCULAR ONCE
Refills: 0 | Status: DISCONTINUED | OUTPATIENT
Start: 2023-10-10 | End: 2023-10-23

## 2023-10-10 RX ORDER — DONEPEZIL HYDROCHLORIDE 10 MG/1
10 TABLET, FILM COATED ORAL AT BEDTIME
Refills: 0 | Status: DISCONTINUED | OUTPATIENT
Start: 2023-10-10 | End: 2023-10-23

## 2023-10-10 RX ORDER — PIPERACILLIN AND TAZOBACTAM 4; .5 G/20ML; G/20ML
3.38 INJECTION, POWDER, LYOPHILIZED, FOR SOLUTION INTRAVENOUS ONCE
Refills: 0 | Status: COMPLETED | OUTPATIENT
Start: 2023-10-10 | End: 2023-10-10

## 2023-10-10 RX ORDER — WARFARIN SODIUM 2.5 MG/1
3.5 TABLET ORAL
Qty: 0 | Refills: 0 | DISCHARGE

## 2023-10-10 RX ORDER — ATORVASTATIN CALCIUM 80 MG/1
40 TABLET, FILM COATED ORAL AT BEDTIME
Refills: 0 | Status: DISCONTINUED | OUTPATIENT
Start: 2023-10-10 | End: 2023-10-23

## 2023-10-10 RX ORDER — CIPROFLOXACIN LACTATE 400MG/40ML
400 VIAL (ML) INTRAVENOUS ONCE
Refills: 0 | Status: COMPLETED | OUTPATIENT
Start: 2023-10-10 | End: 2023-10-10

## 2023-10-10 RX ORDER — ACETAMINOPHEN 500 MG
650 TABLET ORAL EVERY 6 HOURS
Refills: 0 | Status: DISCONTINUED | OUTPATIENT
Start: 2023-10-10 | End: 2023-10-23

## 2023-10-10 RX ORDER — SPIRONOLACTONE 25 MG/1
1 TABLET, FILM COATED ORAL
Qty: 0 | Refills: 0 | DISCHARGE

## 2023-10-10 RX ORDER — METRONIDAZOLE 500 MG
500 TABLET ORAL EVERY 8 HOURS
Refills: 0 | Status: DISCONTINUED | OUTPATIENT
Start: 2023-10-10 | End: 2023-10-10

## 2023-10-10 RX ORDER — SODIUM CHLORIDE 9 MG/ML
2400 INJECTION, SOLUTION INTRAVENOUS ONCE
Refills: 0 | Status: DISCONTINUED | OUTPATIENT
Start: 2023-10-10 | End: 2023-10-10

## 2023-10-10 RX ADMIN — Medication 200 MILLIGRAM(S): at 14:13

## 2023-10-10 RX ADMIN — Medication 5 MILLIGRAM(S): at 12:32

## 2023-10-10 RX ADMIN — LATANOPROST 1 DROP(S): 0.05 SOLUTION/ DROPS OPHTHALMIC; TOPICAL at 22:54

## 2023-10-10 RX ADMIN — SODIUM CHLORIDE 100 MILLILITER(S): 9 INJECTION INTRAMUSCULAR; INTRAVENOUS; SUBCUTANEOUS at 17:29

## 2023-10-10 RX ADMIN — PIPERACILLIN AND TAZOBACTAM 200 GRAM(S): 4; .5 INJECTION, POWDER, LYOPHILIZED, FOR SOLUTION INTRAVENOUS at 12:29

## 2023-10-10 RX ADMIN — SODIUM CHLORIDE 2500 MILLILITER(S): 9 INJECTION INTRAMUSCULAR; INTRAVENOUS; SUBCUTANEOUS at 11:00

## 2023-10-10 RX ADMIN — PIPERACILLIN AND TAZOBACTAM 25 GRAM(S): 4; .5 INJECTION, POWDER, LYOPHILIZED, FOR SOLUTION INTRAVENOUS at 22:54

## 2023-10-10 RX ADMIN — QUETIAPINE FUMARATE 12.5 MILLIGRAM(S): 200 TABLET, FILM COATED ORAL at 22:54

## 2023-10-10 RX ADMIN — DONEPEZIL HYDROCHLORIDE 10 MILLIGRAM(S): 10 TABLET, FILM COATED ORAL at 22:54

## 2023-10-10 RX ADMIN — Medication 100 MILLIGRAM(S): at 15:48

## 2023-10-10 RX ADMIN — ATORVASTATIN CALCIUM 40 MILLIGRAM(S): 80 TABLET, FILM COATED ORAL at 22:54

## 2023-10-10 RX ADMIN — MEMANTINE HYDROCHLORIDE 10 MILLIGRAM(S): 10 TABLET ORAL at 22:54

## 2023-10-10 NOTE — ED ADULT NURSE REASSESSMENT NOTE - NS ED NURSE REASSESS COMMENT FT1
pt alert to name and . VS documented. Pt medicated with Cipro as per MD orders. Purwick placed on pt due to incontinence. Safety maintained, bed at lowest position, call bell within reach. Contact and airborne precautions in place.

## 2023-10-10 NOTE — GOALS OF CARE CONVERSATION - ADVANCED CARE PLANNING - CONVERSATION DETAILS
pt is 83 years old with chronic a-fib (on warfarin), PPM, HTN, HLD, umbilical hernia (sister says she was not a surgical candidate to have that fixed) and dementia who lives with sister and is partially dependent for ADLs (can feed self, brush teeth but does not bathe or drive. Needs meds laid out for her.     Pt's current condition including ischemic bowel, hypotension (improved after IVF resuscitation), and UTI explained to Nona and Alondra (both of whom are RNs).     Seen by surgery who recommends conservative care for now and will reevaluate should condition deteriorate.     Options discussed:  conservative medical management such as supportive care, IVF and antibiotics  aggressive resuscitative measures including need for surgery, pressors and need for CPR/intubation    Family strongly leaning towards conservative measures then comfort care if she deteriorates or does not improve. However, pt remains full code as of now.    Palliative care consulted for ongoing discussion and clarification/further determination of goals of care.

## 2023-10-10 NOTE — CONSULT NOTE ADULT - ASSESSMENT
82 yo female with multiple comorbidities comes to the ED due to weakness and elevated INR secondary to diarrhea.   CT abdomen concerning for ischemic colitis of the right colon, with pneumatosis intestinalis, at the moment the patient is not a surgical candidate due to elevated INR, electrolyte abnormalities and hypotension.  The right colon is not a typical location for ischemic colitis, pneumatosis can be also related to infectious process, at the moment the pt has not peritoneal signs and requires conservative management.    Recommendations:    Conservative management  Supportive care  IV antibiotics  Colorectal surgery will follow and reassess if patient will need surgical intervention  Recommend goal of care discussion with family    Case discussed with Dr. Nader Martino

## 2023-10-10 NOTE — ED PROVIDER NOTE - PROGRESS NOTE DETAILS
Malcolm Son for ED attending, Dr. Guillermo: Pt sister at bedside, reports that pt has had intermittent diarrhea for the past few days which is not abnormal for her, they went to move patient and pt was too weak to stand, was lowered to ground, no head trauma, did not fall. She also reports that pt had nurse visit today and was found to have elevated INR on POC testing and pt had vitamin K ordered for her. Malcolm Son for ED attending, Dr. Guillermo: ICU consulted, PA at bedside [facility]: per ICU- okay for floor; endorsed to Dr. Alvarez for admission.

## 2023-10-10 NOTE — H&P ADULT - HISTORY OF PRESENT ILLNESS
82 y/o female with PMHx of dementia, chronic AFIB on coumadin, s/p PPM, HTN, HLD, glaucoma, hypothyroid who presented to  with CC of weakness, diarrhea.  History is obtained from patient's family.  Patient lives at home with her sister-- Kathy.  Kathy was out of town last few days but as per family, patient has been declining.  She wasn't eating as much, having some diarrhea.  She had her INR checked which was elevated last week and her coumadin dosing was adjusted.  Today, her sister tried getting her up OOB to clean her up after having diarrhea and she came weak and had near syncope and they lowered her to the ground.  No fall/ trauma.  They brought her to the Er for further evaluation.  In the ER, leonarda found to have hypotension with inital SBP in 50's.  Elevated lactate 3.1.  Patient given 2.5 L NSS.  CT concerning for ischemic colitis-- right colon.  Surgery was consulted who recommended conservative management for now and close f/u exams.  Patient denies abd pain.  Patient also tested positive for COVID-- sister denies sx other than weakness/ diarrhea.  Patient also found to have UTI with postiive UA.  Patient pancultured and started on cipro/ flagyl.  Also given 1 dose zosyn.  Patient seen by ICU with septic shock      PAST MEDICAL & SURGICAL HISTORY:  Chronic atrial fibrillation  Hypertension, unspecified type  Hyperlipidemia, unspecified hyperlipidemia type  Pacemaker at end of battery life  Glaucoma of both eyes, unspecified glaucoma type  Chronic midline low back pain without sciatica  Hypothyroidism, unspecified type  Obesity, unspecified classification, unspecified obesity type, unspecified whether serious comorbidity present  S/P dilatation and curettage 1970s  Artificial cardiac pacemaker 2008        FAMILY HISTORY:  No pertinent family history in first degree relatives      Social History:      Allergies:  No Known Allergies   84 y/o female with PMHx of dementia, chronic AFIB on coumadin, s/p PPM, HTN, HLD, glaucoma, hypothyroid who presented to  with CC of weakness, diarrhea.  History is obtained from patient's family.  Patient lives at home with her sister-- Kathy.  Kathy was out of town last few days but as per family, patient has been declining.  She wasn't eating as much, having some diarrhea.  She had her INR checked which was elevated last week and her coumadin dosing was adjusted.  Today, her sister tried getting her up OOB to clean her up after having diarrhea and she came weak and had near syncope and they lowered her to the ground.  No fall/ trauma.  They brought her to the Er for further evaluation.  In the ER, leonarda found to have hypotension with inital SBP in 50's.  Elevated lactate 3.1.  Patient given 2.5 L NSS.  CT concerning for ischemic colitis-- right colon.  Surgery was consulted who recommended conservative management for now and close f/u exams.  Patient denies abd pain.  Patient also tested positive for COVID-- sister denies sx other than weakness/ diarrhea.  Patient also found to have UTI with postiive UA.  Patient pancultured and started on cipro/ flagyl.  Also given 1 dose zosyn.  Patient seen by ICU with septic shock      PAST MEDICAL & SURGICAL HISTORY:  Chronic atrial fibrillation  Hypertension, unspecified type  Hyperlipidemia, unspecified hyperlipidemia type  Pacemaker at end of battery life  Glaucoma of both eyes, unspecified glaucoma type  Chronic midline low back pain without sciatica  Hypothyroidism, unspecified type  Obesity, unspecified classification, unspecified obesity type, unspecified whether serious comorbidity present  S/P dilatation and curettage 1970s  Artificial cardiac pacemaker 2008        FAMILY HISTORY:  No pertinent family history in first degree relatives      Social History:    Lives at home with sister.    No smoking/ etoh use.        Allergies:  No Known Allergies   84 y/o female with PMHx of dementia, chronic AFIB on coumadin, s/p PPM, HTN, HLD, glaucoma, hypothyroid who presented to  with CC of weakness, diarrhea.  History is obtained from patient's family.  Patient lives at home with her sister-- Kathy.  Kathy was out of town last few days but as per family, patient has been declining.  She wasn't eating as much, having some diarrhea.  She had her INR checked which was elevated last week and her coumadin dosing was adjusted.  Today, her sister tried getting her up OOB to clean her up after having diarrhea and she came weak and had near syncope and they lowered her to the ground.  No fall/ trauma.  They brought her to the Er for further evaluation.  In the ER, leonarda found to have hypotension with inital SBP in 50's.  Elevated lactate 3.1.  Patient given 2.5 L NSS.  CT concerning for ischemic colitis-- right colon.  Surgery was consulted who recommended conservative management for now and close f/u exams.  Patient denies abd pain.  Patient also tested positive for COVID-- sister denies sx other than weakness/ diarrhea.  Patient also found to have UTI with postiive UA.  Patient pancultured and started on cipro/ flagyl.  Also given 1 dose zosyn.  Patient seen by ICU with septic shock-- stated okay for floors.        PAST MEDICAL & SURGICAL HISTORY:  Chronic atrial fibrillation  Hypertension, unspecified type  Hyperlipidemia, unspecified hyperlipidemia type  Pacemaker at end of battery life  Glaucoma of both eyes, unspecified glaucoma type  Chronic midline low back pain without sciatica  Hypothyroidism, unspecified type  Obesity, unspecified classification, unspecified obesity type, unspecified whether serious comorbidity present  S/P dilatation and curettage 1970s  Artificial cardiac pacemaker 2008        FAMILY HISTORY:  No pertinent family history in first degree relatives      Social History:    Lives at home with sister.    No smoking/ etoh use.        Allergies:  No Known Allergies

## 2023-10-10 NOTE — PHARMACOTHERAPY INTERVENTION NOTE - COMMENTS
Medication history complete, reviewed medication with list provided by patients family and confirmed DrFirst.

## 2023-10-10 NOTE — CONSULT NOTE ADULT - ASSESSMENT
Pt is a 83y old Female with hx of chronic a-fib (on warfarin), PPM (? at end of life per chart), HTN, HLD, umbilical hernia (sister says she was not a surgical candidate to have that fixed) and dementia    Process of Care  --Reviewed dx/treatment problems and alignment with Goals of Care    Physical Aspects of Care  --Pain  patient denies at this time  c/w current managment    --Bowel Regimen  denies constipation  risk for constipation d/t immobility  daily dulcolax    --Dyspnea  No SOB at this time  comfortable and in NAD  + for covid    --Nausea Vomiting  denies    --Weakness  PT as tolerated     Psychological and Psychiatric Aspects of Care:   --Greif/Bereavment: emotional support provided  --Hx of psychiatric dx: none  -Pastoral Care Available PRN     Social Aspects of Care  -SW involved     Cultural Aspects  -Primary Language: English    Goals of Care:     We discussed Palliative Care team being a supportive team when a patient has ongoing illnesses.  We also discussed that it is not an end of life care service, but can help navigate symptoms and emotional support throughout their hospital stay here.    Ethical and Legal Aspects:  NA    Capacity- patient does not have capacity     HCP/Surrogate: HCP on one content naming Alondra with alternate Wero Pastor     Code Status- full code   MOLST-  Dispo Plan-    Discussed With: Dr. Salmon coordinated with attending and SW and RN     Time Spent: 90 minutes including the care, coordination and counseling of this patient, 50% of which was spent coordinating and counseling.

## 2023-10-10 NOTE — H&P ADULT - ASSESSMENT
82 y/o female with PMHx of dementia, chronic AFIB on coumadin, s/p PPM, HTN, HLD, glaucoma, hypothyroid who presented to  with CC of weakness, diarrhea and near syncope at home. Patient admitted for ischemic colitis, UTI, septic shock and COVID.        #Septic Shock:    Sources of infection UTI/ COVID/ ischemic colitis.    Admit to tele.    F/u pancultures.    S/p cipro/ flagyl/ zosyn in ER.    Cont zosyn pending cultures.    Suspect shock more related to UTI/ ischemic colitis.    Trend lactate and WBC.      #Ischemic Colitis:    Right colon on CT.    Appreciate surgical eval-- no plans for OR for now.    Trend labs/ lactate.    NPO except meds for now.    Distended on exam but no significant tenderness on exam.    Serial abd exams.    GI eval.      #UTI:    F/u cultures.    Zosyn as above.      #Coagulopathy:    Secondary to couadin.    INR 15.    S/p vitamin K 5 PO in ER.   Repeat labs in am.    No bleeding.      #SHANA:    60/1.8.    Suspect all prerenal.    Patient was on lasix at home.    S/p 2.5 L in ER.    Only 35 cc of urine in bladder.    1 L NSS x 1 now.    NSS @ 100.      #COVID:    PAtient with sx of weakness/ diarrhea but could also be explained by colitis/ UTI.    Trend.    No SOB/ cough/ fever.    Supportive care for now.      #Diarrhea:    Check gi pcr/ cdiff.    May be related to ischemic colitis/ COVID.      #Dementia:    Cont aricept/ namenda.    CO on floors.    Seroquel PRN.      #Afib on coumadin:    AS above.  Hold coumadin.    Cont dig-- check level.    Hold coreg with hypotension.      #HTN:    Hold ACE/ Lasix/ Spironolactone with hypotension.      #Hypothyroid:    Cont synthroid.      #HLD:    Cont statin.      #DVT Proph:  coagulopathic.      #Advanced directives:  as above.  DNR/ DNI.  No pressors.  No central line.

## 2023-10-10 NOTE — PATIENT PROFILE ADULT - FALL HARM RISK - HARM RISK INTERVENTIONS
Assistance with ambulation/Assistance OOB with selected safe patient handling equipment/Communicate Risk of Fall with Harm to all staff/Discuss with provider need for PT consult/Monitor for mental status changes/Monitor gait and stability/Move patient closer to nurses' station/Provide patient with walking aids - walker, cane, crutches/Reinforce activity limits and safety measures with patient and family/Reorient to person, place and time as needed/Tailored Fall Risk Interventions/Toileting schedule using arm’s reach rule for commode and bathroom/Use of alarms - bed, chair and/or voice tab/Visual Cue: Yellow wristband and red socks/Bed in lowest position, wheels locked, appropriate side rails in place/Call bell, personal items and telephone in reach/Instruct patient to call for assistance before getting out of bed or chair/Non-slip footwear when patient is out of bed/[place] to call system/Physically safe environment - no spills, clutter or unnecessary equipment/Purposeful Proactive Rounding/Room/bathroom lighting operational, light cord in reach

## 2023-10-10 NOTE — H&P ADULT - NSHPLABSRESULTS_GEN_ALL_CORE
12.3   15.24 )-----------( 351      ( 10 Oct 2023 11:03 )             35.5     10-10    127<L>  |  96  |  60<H>  ----------------------------<  142<H>  4.1   |  19<L>  |  1.78<H>    Ca    8.7      10 Oct 2023 11:03    TPro  5.7<L>  /  Alb  2.6<L>  /  TBili  0.8  /  DBili  x   /  AST  37  /  ALT  36  /  AlkPhos  82  10-10    CAPILLARY BLOOD GLUCOSE      POCT Blood Glucose.: 118 mg/dL (10 Oct 2023 11:31)    PT/INR - ( 10 Oct 2023 11:03 )   PT: 136.8 sec;   INR: 13.05 ratio         PTT - ( 10 Oct 2023 11:03 )  PTT:45.6 sec  Urinalysis Basic - ( 10 Oct 2023 11:03 )    Color: Dark Yellow / Appearance: Turbid / S.025 / pH: x  Gluc: 142 mg/dL / Ketone: Trace mg/dL  / Bili: Small / Urobili: 1.0 mg/dL   Blood: x / Protein: 30 mg/dL / Nitrite: Negative   Leuk Esterase: Large / RBC: 12 /HPF / WBC >998 /HPF   Sq Epi: x / Non Sq Epi: 1 /HPF / Bacteria: Many /HPF 12.3   15.24 )-----------( 351      ( 10 Oct 2023 11:03 )             35.5     10-10    127<L>  |  96  |  60<H>  ----------------------------<  142<H>  4.1   |  19<L>  |  1.78<H>    Ca    8.7      10 Oct 2023 11:03    TPro  5.7<L>  /  Alb  2.6<L>  /  TBili  0.8  /  DBili  x   /  AST  37  /  ALT  36  /  AlkPhos  82  10-10    CAPILLARY BLOOD GLUCOSE      POCT Blood Glucose.: 118 mg/dL (10 Oct 2023 11:31)    PT/INR - ( 10 Oct 2023 11:03 )   PT: 136.8 sec;   INR: 13.05 ratio         PTT - ( 10 Oct 2023 11:03 )  PTT:45.6 sec  Urinalysis Basic - ( 10 Oct 2023 11:03 )    Color: Dark Yellow / Appearance: Turbid / S.025 / pH: x  Gluc: 142 mg/dL / Ketone: Trace mg/dL  / Bili: Small / Urobili: 1.0 mg/dL   Blood: x / Protein: 30 mg/dL / Nitrite: Negative   Leuk Esterase: Large / RBC: 12 /HPF / WBC >998 /HPF   Sq Epi: x / Non Sq Epi: 1 /HPF / Bacteria: Many /HPF    < from: CT Abdomen and Pelvis w/ IV Cont (10.10.23 @ 12:13) >      IMPRESSION:  1.  Fluid-filled ascending colon pneumatosis, suspicious for acute   ischemic colitis.  2.  Diffusely distended small bowel loops likely representing a reactive   ileus. No transition point to suggest a small bowel obstruction.  3.  Small volume ascites, possibly reactive.    Findings of ischemic colitis were discussed with Dr. CALIXTO THOMPSON   10/10/2023 12:59 PM Mauricio Neal with read back confirmation.    < end of copied text >

## 2023-10-10 NOTE — ED PROVIDER NOTE - CARE PLAN
1 Principal Discharge DX:	Ischemic colitis  Secondary Diagnosis:	2019 novel coronavirus disease (COVID-19)

## 2023-10-10 NOTE — ED PROVIDER NOTE - NSICDXPASTMEDICALHX_GEN_ALL_CORE_FT
PAST MEDICAL HISTORY:  Chronic atrial fibrillation     Chronic midline low back pain without sciatica     Glaucoma of both eyes, unspecified glaucoma type     Hyperlipidemia, unspecified hyperlipidemia type     Hypertension, unspecified type     Hypothyroidism, unspecified type     Obesity, unspecified classification, unspecified obesity type, unspecified whether serious comorbidity present     Pacemaker at end of battery life

## 2023-10-10 NOTE — ED PROVIDER NOTE - OBJECTIVE STATEMENT
84 y/o female with PMHx of a-fib s/p PPM presents to the ED with generalized weakness. Per EMS pt was at home and had near syncopal episode, pt did not fall to ground, EMS also reports pt is confused at baseline. Pt hypotensive upon arrival to the ED and evaluated in trauma bay, Pt is confused upon evaluation, hypotensive however is awake and alert.

## 2023-10-10 NOTE — H&P ADULT - NSHPPHYSICALEXAM_GEN_ALL_CORE
PEx  T(C): 36.8 (10-10-23 @ 15:40), Max: 37.1 (10-10-23 @ 11:05)  HR: 88 (10-10-23 @ 17:06) (46 - 94)  BP: 122/74 (10-10-23 @ 17:06) (55/40 - 122/74)  RR: 19 (10-10-23 @ 17:06) (17 - 26)  SpO2: 98% (10-10-23 @ 17:06) (92% - 100%)  Wt(kg): --  General:     Well appearing, well nourished in no distress, no identifying marks , scars, or tattoos.  Skin: no rash or prominent lesions  Head: normocephalic, atraumatic     Sinuses: non-tender  Nose: no external lesions, mucosa non-inflamed, septum and turbinates normal  Throat: no erythema, exudates or lesions.  Neck: Supple without lymphadenopathy. Thyroid no thyromegaly, no palpable thyroid nodules, no palpable nodules or masses, carotid arteries no bruits.   Breasts: No palpable masses or lesions.  Heart: RRR, no murmur or gallop.  Normal S1, S2.  No S3, S4.   Lungs: CTA bilaterally, no wheezes, rhonchi, rales.  Breathing unlabored.   Chest wall: Normal insp   Abdomen:  Soft, NT/ND, normal bowel sounds, no HSM, no masses.  No peritoneal signs.   Back: spine normal without deformity or tenderness.  Normal ROM   : Exam normal.  no inguinal hernias.  Extremities: No deformities, clubbing, cyanosis, or edema.  Musculoskeletal: Normal gait and station. No decreased range of motion, instability, atrophy or abnormal strength or tone in the head, neck, spine, ribs, pelvis or extremities.   Neurologic: CN 2-12 normal. Sensation to pain, touch and proprioception normal. DTRs normal in upper and lower extremities. No pathologic reflexes.  Motor normal.  Psychiatric: Oriented X3, intact recent and remote memory, judgement and insight, normal mood and affect. PEx  T(C): 36.8 (10-10-23 @ 15:40), Max: 37.1 (10-10-23 @ 11:05)  HR: 88 (10-10-23 @ 17:06) (46 - 94)  BP: 122/74 (10-10-23 @ 17:06) (55/40 - 122/74)  RR: 19 (10-10-23 @ 17:06) (17 - 26)  SpO2: 98% (10-10-23 @ 17:06) (92% - 100%)    General:   NAD.  weak appearing.  pale.    Skin: no rash or prominent lesions  Head: normocephalic, atraumatic     Sinuses: non-tender  Nose: no external lesions, mucosa non-inflamed, septum and turbinates normal  Throat: no erythema, exudates or lesions.  Neck: Supple without lymphadenopathy. Thyroid no thyromegaly, no palpable thyroid nodules, no palpable nodules or masses, carotid arteries no bruits.   Breasts: No palpable masses or lesions.  Heart: RRR, no murmur or gallop.  Normal S1, S2.  No S3, S4.   Lungs: CTA bilaterally, no wheezes, rhonchi, rales.  Breathing unlabored.   Chest wall: Normal insp   Abdomen:  distended.  large umbilical hernia.    Back: spine normal without deformity or tenderness.  Normal ROM   : Exam normal.  no inguinal hernias.  Extremities: No deformities, clubbing, cyanosis, or edema.  Musculoskeletal: Normal gait and station. No decreased range of motion, instability, atrophy or abnormal strength or tone in the head, neck, spine, ribs, pelvis or extremities.   Neurologic: CN 2-12 normal. Sensation to pain, touch and proprioception normal. DTRs normal in upper and lower extremities. No pathologic reflexes.  Motor normal.  Psychiatric: confused.

## 2023-10-10 NOTE — CONSULT NOTE ADULT - ASSESSMENT
83 year old female with PMH chronic a-fib (on warfarin), PPM (? at end of life per chart), HTN, HLD, umbilical hernia (sister says she was not a surgical candidate to have that fixed) and dementia. Pt with poor appetite and diarrhea x a few days. Today was weak and unsteady, no fall/helped to floor by sister. A/w SHANA, UTI, supratherapeutic INR, and ischemic colitis as seen on CT. Hypotension (now resolved).      Pt hemodynamically stable at this time, no need for ICU admission.  palliative care consult placed  continue conservative medical management for now including abx and IVF  hold warfarin (vitamin K x 1 given in ED, trend INR)   pt remains full code for now but pt either a poor/high risk or not a candidate for surgical intervention. Should she deteriorate speak to family as they are leaning towards DNR/DNI and comfort measures if not improving.  if she is not made DNR PPM should be interrogated as previous chart mentions battery at end of life  remainder of plan per ED/primary team  above as d/w Wilver Ricci (ICU attending) and Eagle (ED attending) 83 year old female with PMH chronic a-fib (on warfarin), PPM (? at end of life per chart), HTN, HLD, umbilical hernia (sister says she was not a surgical candidate to have that fixed) and dementia. Pt with poor appetite and diarrhea x a few days. Today was weak and unsteady, no fall/helped to floor by sister. A/w SHANA, UTI, supratherapeutic INR, and ischemic colitis as seen on CT. Hypotension (now resolved).      Pt hemodynamically stable at this time, no need for ICU admission.  palliative care consult placed  continue conservative medical management for now including abx and IVF  hold warfarin (vitamin K x 1 given in ED, trend INR)   pt high risk for decompensation but remains full code for now. She is either a poor/high risk or not a candidate for surgical intervention. Should she deteriorate speak to family as they are leaning away from resuscitative measure such as pressors and towards DNR/DNI and comfort measures if not improving.  if she is not made DNR PPM should be interrogated as previous chart mentions battery at end of life  remainder of plan per ED/primary team  above as d/w Wilver Ricci (ICU attending) and Eagle (ED attending) 83 year old female with PMH chronic a-fib (on warfarin), PPM (? at end of life per chart), HTN, HLD, umbilical hernia (sister says she was not a surgical candidate to have that fixed) and dementia. Pt with poor appetite and diarrhea x a few days. Today was weak and unsteady, no fall/helped to floor by sister. A/w SHANA, UTI, supratherapeutic INR, and ischemic colitis as seen on CT. Covid + (surveillance cx done in ED, appears asymptomatic). Hypotension (now resolved).      Pt hemodynamically stable at this time, no need for ICU admission.  palliative care consult placed  continue conservative medical management for now including abx and IVF  hold warfarin (vitamin K x 1 given in ED, trend INR)   pt high risk for decompensation but remains full code for now. She is either a poor/high risk or not a candidate for surgical intervention. Should she deteriorate speak to family as they are leaning away from resuscitative measure such as pressors and towards DNR/DNI and comfort measures if not improving.  if she is not made DNR PPM should be interrogated as previous chart mentions battery at end of life  remainder of plan per ED/primary team  above as d/w Wilver Ricci (ICU attending) and Eagle (ED attending)

## 2023-10-10 NOTE — ED ADULT NURSE REASSESSMENT NOTE - NS ED NURSE REASSESS COMMENT FT1
pt awake, oriented 1-2. Abd soflty distended, with abd hernia noted. Pt has not voided spontaneously s/p IVF, bladder scanned for 34ml. Dr Alvarez aware with orders for 1LNSx 2 hrs. No BM noted. Pt restless, pulled off o2 monitor and found sitting up at end of bed with legs off stretcher, pt immediately repositoned in stretcher. Dr Sheikh aware with order for CO for floor.

## 2023-10-10 NOTE — ED ADULT NURSE NOTE - OBJECTIVE STATEMENT
Pt is 82yo F, presents to the ED with generalized weakness. EMS reports pts is confused at baseline. As per EMS, pt was found at home and had a near syncopal episode. pt did not fall to ground. Upon arrival to ED, pt hypotensive with BP of 55/40, soiled in feces and asking why she is at hospital. Upon arrival, pt evaluated, labs drawn and sent to lab. BGL of 118. 2L of NS bolus started. BP improved with 2L of NS bolus. Cardiac monitor in place, afib with 95bpm.  PMHx of donnyfib Pt is 84yo F, presents to the ED with generalized weakness. EMS reports pts is confused at baseline. As per EMS, pt was found at home and had a near syncopal episode. pt did not fall to ground. Upon arrival to ED, pt hypotensive with BP of 55/40, soiled in feces and asking why she is at hospital. Upon arrival, pt evaluated, labs drawn and sent to lab. BGL of 118. 2L of NS bolus started. BP improved with 2L of NS bolus. Cardiac monitor in place, afib with 95bpm.  PMHx of a-fib  Upon assessment, pt has large LLQ umbilical hernia. Skin intact.

## 2023-10-10 NOTE — ED ADULT NURSE NOTE - NS ED NURSE REPORT GIVEN DT
Patient tested positive for COVID on 11/18/2022 with symptom of sore throat. She currently denies any s/s of COVID.
10-Oct-2023 17:40

## 2023-10-10 NOTE — ED ADULT TRIAGE NOTE - CHIEF COMPLAINT QUOTE
Pt. BIBEMS from home c/o weakness and near syncopal episode. EMS reports pt. confused at baseline, oriented to self. Family was walking with pt. when she almost collapsed, did not fall, did not hit her head, was lowered to ground and EMS called. Pt. currently oriented to self. Pt. hypotensive in triage, Dr Guillermo notified, pt. brought directly to trauma.

## 2023-10-10 NOTE — ED PROVIDER NOTE - CLINICAL SUMMARY MEDICAL DECISION MAKING FREE TEXT BOX
advanced aged with generalized weakness, hypotension, differentials include but not limited to anemia, dehydration, infectious etiology, screening EKG, CXR, labs, UA, CT 82 yo female with generalized weakness, hypotension, differential dx include but not limited to anemia, dehydration, infectious etiology, screening EKG, CXR, labs, UA, CT

## 2023-10-10 NOTE — H&P ADULT - CONVERSATION DETAILS
Discussion with patient's niece Alondra and patient's sister Wero.    Discussed goals over care.    Discussed all aggressive treatments.    Decision for DNR/ DNI.    No trial of BIPAP.    No central line.    No pressors.    Okay for IVF and antibitoics.

## 2023-10-10 NOTE — H&P ADULT - NSHPREVIEWOFSYSTEMS_GEN_ALL_CORE
ROS:  General:  No fevers, chills, or unexplained weight loss  Skin: No rash or bothersome skin lesions  Musculoskeletal: No arthalgias, myalgias or joint swelling  Eyes: No visual changes or eye pain  Ears: No hearing loss , otorrhea or ear pain  Nose, Mouth, Throat: No nasal congestion, rhinorrhea, oral lesions, postnasal drip or sore throat  Cardio: No chest pain or palpitations. no lower extremity edema. no syncope. no claudication.   Respiratory: No cough, shortness of breath or wheezing   GI: No diarrhea, constipation, blood in stools, abdominal pain, vomiting or heartburn  : No urinary frequency, hematuria, incontinence, or dysuria  Neurologic: No headaches, parasthesias, confusion, dysarthria or gait instability  Psychiatric:  No anxiety or depression  Lymphatic:  No easy bruising, easy bleeding or swollen glands  Allergic: No itching, sneezing , watery eyes, clear rhinorrhea or recurrent infections ROS:  General:  weakness  Skin: No rash or bothersome skin lesions  Musculoskeletal: No arthalgias, myalgias or joint swelling  Eyes: No visual changes or eye pain  Ears: No hearing loss , otorrhea or ear pain  Nose, Mouth, Throat: No nasal congestion, rhinorrhea, oral lesions, postnasal drip or sore throat  Cardio: No chest pain or palpitations. no lower extremity edema. no syncope. no claudication.   Respiratory: No cough, shortness of breath or wheezing   GI: diarrhea  : No urinary frequency, hematuria, incontinence, or dysuria  Neurologic: No headaches, parasthesias, confusion, dysarthria or gait instability  Psychiatric:  No anxiety or depression  Lymphatic:  No easy bruising, easy bleeding or swollen glands  Allergic: No itching, sneezing , watery eyes, clear rhinorrhea or recurrent infections

## 2023-10-10 NOTE — PATIENT PROFILE ADULT - FUNCTIONAL ASSESSMENT - BASIC MOBILITY 1.
Type 2 diabetes mellitus without complication, with long-term current use of insulin 3 = A little assistance

## 2023-10-11 LAB
ANION GAP SERPL CALC-SCNC: 8 MMOL/L — SIGNIFICANT CHANGE UP (ref 5–17)
BUN SERPL-MCNC: 47 MG/DL — HIGH (ref 7–23)
CALCIUM SERPL-MCNC: 8 MG/DL — LOW (ref 8.5–10.1)
CHLORIDE SERPL-SCNC: 105 MMOL/L — SIGNIFICANT CHANGE UP (ref 96–108)
CO2 SERPL-SCNC: 19 MMOL/L — LOW (ref 22–31)
CREAT SERPL-MCNC: 0.94 MG/DL — SIGNIFICANT CHANGE UP (ref 0.5–1.3)
EGFR: 60 ML/MIN/1.73M2 — SIGNIFICANT CHANGE UP
GLUCOSE SERPL-MCNC: 90 MG/DL — SIGNIFICANT CHANGE UP (ref 70–99)
HCT VFR BLD CALC: 31.7 % — LOW (ref 34.5–45)
HGB BLD-MCNC: 10.9 G/DL — LOW (ref 11.5–15.5)
INR BLD: 4.63 RATIO — HIGH (ref 0.85–1.18)
MCHC RBC-ENTMCNC: 31.3 PG — SIGNIFICANT CHANGE UP (ref 27–34)
MCHC RBC-ENTMCNC: 34.4 GM/DL — SIGNIFICANT CHANGE UP (ref 32–36)
MCV RBC AUTO: 91.1 FL — SIGNIFICANT CHANGE UP (ref 80–100)
PLATELET # BLD AUTO: 264 K/UL — SIGNIFICANT CHANGE UP (ref 150–400)
POTASSIUM SERPL-MCNC: 2.9 MMOL/L — CRITICAL LOW (ref 3.5–5.3)
POTASSIUM SERPL-SCNC: 2.9 MMOL/L — CRITICAL LOW (ref 3.5–5.3)
PROTHROM AB SERPL-ACNC: 50 SEC — HIGH (ref 9.5–13)
RBC # BLD: 3.48 M/UL — LOW (ref 3.8–5.2)
RBC # FLD: 13.3 % — SIGNIFICANT CHANGE UP (ref 10.3–14.5)
SODIUM SERPL-SCNC: 132 MMOL/L — LOW (ref 135–145)
WBC # BLD: 9.16 K/UL — SIGNIFICANT CHANGE UP (ref 3.8–10.5)
WBC # FLD AUTO: 9.16 K/UL — SIGNIFICANT CHANGE UP (ref 3.8–10.5)

## 2023-10-11 PROCEDURE — 99232 SBSQ HOSP IP/OBS MODERATE 35: CPT

## 2023-10-11 PROCEDURE — 99233 SBSQ HOSP IP/OBS HIGH 50: CPT

## 2023-10-11 PROCEDURE — 99222 1ST HOSP IP/OBS MODERATE 55: CPT

## 2023-10-11 PROCEDURE — 99497 ADVNCD CARE PLAN 30 MIN: CPT

## 2023-10-11 RX ORDER — POTASSIUM CHLORIDE 20 MEQ
10 PACKET (EA) ORAL
Refills: 0 | Status: COMPLETED | OUTPATIENT
Start: 2023-10-11 | End: 2023-10-11

## 2023-10-11 RX ORDER — ACETAMINOPHEN 500 MG
1000 TABLET ORAL ONCE
Refills: 0 | Status: COMPLETED | OUTPATIENT
Start: 2023-10-11 | End: 2023-10-11

## 2023-10-11 RX ADMIN — PIPERACILLIN AND TAZOBACTAM 25 GRAM(S): 4; .5 INJECTION, POWDER, LYOPHILIZED, FOR SOLUTION INTRAVENOUS at 05:16

## 2023-10-11 RX ADMIN — Medication 400 MILLIGRAM(S): at 12:54

## 2023-10-11 RX ADMIN — LATANOPROST 1 DROP(S): 0.05 SOLUTION/ DROPS OPHTHALMIC; TOPICAL at 22:23

## 2023-10-11 RX ADMIN — QUETIAPINE FUMARATE 12.5 MILLIGRAM(S): 200 TABLET, FILM COATED ORAL at 01:52

## 2023-10-11 RX ADMIN — Medication 100 MILLIEQUIVALENT(S): at 10:14

## 2023-10-11 RX ADMIN — PIPERACILLIN AND TAZOBACTAM 25 GRAM(S): 4; .5 INJECTION, POWDER, LYOPHILIZED, FOR SOLUTION INTRAVENOUS at 22:23

## 2023-10-11 RX ADMIN — PIPERACILLIN AND TAZOBACTAM 25 GRAM(S): 4; .5 INJECTION, POWDER, LYOPHILIZED, FOR SOLUTION INTRAVENOUS at 14:20

## 2023-10-11 RX ADMIN — Medication 100 MILLIEQUIVALENT(S): at 14:21

## 2023-10-11 RX ADMIN — Medication 100 MILLIEQUIVALENT(S): at 12:54

## 2023-10-11 RX ADMIN — Medication 50 MICROGRAM(S): at 05:16

## 2023-10-11 NOTE — CONSULT NOTE ADULT - ASSESSMENT
83 year old female with hx AF on VKA, PPM, dementia, HTN presenting with weakness, loose stools, near syncope with significant hypotension, sepsis, and ischemic colitis in setting of +UTI and COVID+.    Imp: Ischemic colitis, small bowel ileus in setting of infection/virus    Rec:  ::Per discussion with primary team- GOC meeting to take place to address comfort measures  ::No emergent endoscopic evaluation recommended  ::Conservative management, appreciate palliative team involvement  ::May opt for PPN per family wishes as patient NPO  ::Replete zaina

## 2023-10-11 NOTE — CHART NOTE - NSCHARTNOTEFT_GEN_A_CORE
Patient discussed with NOK, sister, Wero Pastor, cell [phone]. Patient's diagnosis and situation discussed. Sister confirms that she and family believe it would be too distressing for the patient to wake up with potential tubes of the abdomen & mouth, given her dementia. As such, they would not want to pursue surgery, if the situation were to arise. They prefer for her to be comfortable. As such, Colorectal Surgery will no longer be regularly seeing this patient. Please reconsult if family should change their mind.

## 2023-10-11 NOTE — PROGRESS NOTE ADULT - ASSESSMENT
Pt is a 83y old Female with hx of chronic a-fib (on warfarin), PPM (? at end of life per chart), HTN, HLD, umbilical hernia (sister says she was not a surgical candidate to have that fixed) and dementia    Process of Care  --Reviewed dx/treatment problems and alignment with Goals of Care    Physical Aspects of Care  --Pain  patient denies at this time  c/w current management    --Bowel Regimen  denies constipation  risk for constipation d/t immobility  daily dulcolax    --Dyspnea  No SOB at this time  comfortable and in NAD  + for covid    --Nausea Vomiting  denies    --Weakness  PT as tolerated     Psychological and Psychiatric Aspects of Care:   --Greif/Bereavment: emotional support provided  --Hx of psychiatric dx: none  -Pastoral Care Available PRN     Social Aspects of Care  -SW involved     Cultural Aspects  -Primary Language: English    Goals of Care:     We discussed Palliative Care team being a supportive team when a patient has ongoing illnesses.  We also discussed that it is not an end of life care service, but can help navigate symptoms and emotional support throughout their hospital stay here.    Ethical and Legal Aspects:  NA    Capacity- patient does not have capacity     HCP/Surrogate: new HCP place on chart naming Alondra     Code Status- DNR/I   MOLST-  Dispo Plan-    Discussed With: Dr. Salmon coordinated with attending and SW and RN     Time Spent: 55 minutes including the care, coordination and counseling of this patient, 50% of which was spent coordinating and counseling.

## 2023-10-11 NOTE — PROGRESS NOTE ADULT - CONVERSATION DETAILS
We discussed the Palliative Care team being a supportive team when a patient has ongoing illnesses.  We also discussed that it is not an end-of-life care service, but can help navigate symptoms and emotional support throughout their hospital stay here. Met with the patient family they shared that the patient has been getting increasingly more confused over the past few months  She has been living with her sister and her brother-in-law who have medical issues that are happening and it has been increasingly difficult to care for her at home. The patient can still wash herself but has to be directed to everything that she needs to do. The patient has also been going to the [facility] three days a week as there was concern about leaving the patient home alone.   We did discuss Hospice and it was explained an end-of-life care philosophy.  When a disease cannot be cured, or the family/patient decides the treatment burdens outweigh the risk and one chooses to change the focus of treatment from cure to quality/comfort. at this time we will give it a few days to see how the patient is doing since she has been here in the hospital.   Dr. Ott joined us and gave family medical updates, and we will see over the next few days how the patient is doing  We also talked about disposition options and at this time as it has become more difficult to take care of the patient and they are aware that dementia is progressive and the patient may continue to decline, we discussed different options family would like to look into [facility] options with a possible transition to long term care depending on how patient is doing.   Emotional Support provided. MOLST form filled out reflecting DNR/I signed by the patients sister and witness but niece Kathy who is HCP - family is all in agreement with the form, will fill out a new one on D/C when further decisions have been made PROVIDER:[TOKEN:[[id number]]]

## 2023-10-11 NOTE — CONSULT NOTE ADULT - ASSESSMENT
82 y/o female with PMHx of dementia, chronic AFIB on coumadin, s/p PPM, HTN, HLD, glaucoma, hypothyroid who presented to  with CC of weakness, diarrhea.  History is obtained from patient's family.  Patient lives at home with her sister-- Kathy.  Kathy was out of town last few days but as per family, patient has been declining.  She wasn't eating as much, having some diarrhea.  She had her INR checked which was elevated last week and her coumadin dosing was adjusted. Pts sister tried getting her up OOB to clean her up after having diarrhea and she came weak and had near syncope and they lowered her to the ground.  No fall/ trauma.  They brought her to the Er for further evaluation.  In the ER, catrinanet found to have hypotension with inital SBP in 50's.  Elevated lactate 3.1.  Patient given 2.5 L NSS.  CT concerning for ischemic colitis-- right colon.  Surgery was consulted who recommended conservative management for now and close f/u exams.  Patient denies abd pain.  Patient also tested positive for COVID-- sister denies sx other than weakness/ diarrhea.  Patient also found to have UTI with postiive UA.  Patient pancultured and started on cipro/ flagyl.  Also given 1 dose zosyn.   84 y/o female with PMHx of dementia, chronic AFIB on coumadin, s/p PPM, HTN, HLD, glaucoma, hypothyroid who presented to  with CC of weakness, diarrhea.  History is obtained from patient's family.  Patient lives at home with her sister-- Kathy.  Kathy was out of town last few days but as per family, patient has been declining.  She wasn't eating as much, having some diarrhea.  She had her INR checked which was elevated last week and her coumadin dosing was adjusted. Pts sister tried getting her up OOB to clean her up after having diarrhea and she came weak and had near syncope and they lowered her to the ground.  No fall/ trauma.  They brought her to the Er for further evaluation.  In the ER, leonarda found to have hypotension with inital SBP in 50's.  Elevated lactate 3.1.  Patient given 2.5 L NSS.  CT concerning for ischemic colitis-- right colon.  Surgery was consulted who recommended conservative management for now and close f/u exams.  Patient denies abd pain.  Patient also tested positive for COVID-- sister denies sx other than weakness/ diarrhea.  Patient also found to have UTI with postiive UA.  Patient pancultured and started on cipro/ flagyl.  Also given 1 dose zosyn.      1. Sepsis. Ischemic colitis. Pyuria. UTI. Covid-19 Viral Syndrome.   - gi, surgical f/u noted  - palliative care following  - agree with IV zosyn [id number] for now  - f/u urine cx blood cx  - not hypoxic does not require remdesivir, decadron at this time  - isolation precautions  - fu cbc  - monitor temps  - tolerating abx well so far; no side effects noted  - reason for abx use and side effects reviewed with patient  - supportive care    2. other issues - care per medicine

## 2023-10-11 NOTE — CONSULT NOTE ADULT - NS ATTEND AMEND GEN_ALL_CORE FT
Ischemic colitis  she appears comfomrtable with mild abd tenderness   family to decide goals of care and how aggressively they would treat patient

## 2023-10-11 NOTE — PROGRESS NOTE ADULT - SUBJECTIVE AND OBJECTIVE BOX
Reason for Admission: weakness, diarrhea  History of Present Illness:   82 y/o female with PMHx of dementia, chronic AFIB on coumadin, s/p PPM, HTN, HLD, glaucoma, hypothyroid who presented to  with CC of weakness, diarrhea.  History is obtained from patient's family.  Patient lives at home with her sister-- Kathy.  Kathy was out of town last few days but as per family, patient has been declining.  She wasn't eating as much, having some diarrhea.  She had her INR checked which was elevated last week and her coumadin dosing was adjusted.  Today, her sister tried getting her up OOB to clean her up after having diarrhea and she came weak and had near syncope and they lowered her to the ground.  No fall/ trauma.  They brought her to the Er for further evaluation.  In the ER, leonarda found to have hypotension with inital SBP in 50's.  Elevated lactate 3.1.  Patient given 2.5 L NSS.  CT concerning for ischemic colitis-- right colon.  Surgery was consulted who recommended conservative management for now and close f/u exams.  Patient denies abd pain.  Patient also tested positive for COVID-- sister denies sx other than weakness/ diarrhea.  Patient also found to have UTI with postiive UA.  Patient pancultured and started on cipro/ flagyl.  Also given 1 dose zosyn.  Patient seen by ICU with septic shock-- stated okay for floors.        REVIEW OF SYSTEMS:  General: NAD, hemodynamically stable, (-)  fever, (-) chills, (-) weakness  HEENT:  Eyes:  No visual loss, blurred vision, double vision or yellow sclerae. Ears, Nose, Throat:  No hearing loss, sneezing, congestion, runny nose or sore throat.  SKIN:  No rash or itching.  CARDIOVASCULAR:  No chest pain, chest pressure or chest discomfort. No palpitations or edema.  RESPIRATORY:  No shortness of breath, cough or sputum.  GASTROINTESTINAL:  No anorexia, nausea, vomiting or diarrhea. No abdominal pain or blood.  NEUROLOGICAL:  No headache, dizziness, syncope, paralysis, ataxia, numbness or tingling in the extremities. No change in bowel or bladder control.  MUSCULOSKELETAL:  No muscle, back pain, joint pain or stiffness.  HEMATOLOGIC:  No anemia, bleeding or bruising.  LYMPHATICS:  No enlarged nodes. No history of splenectomy.  ENDOCRINOLOGIC:  No reports of sweating, cold or heat intolerance. No polyuria or polydipsia.  ALLERGIES:  No history of asthma, hives, eczema or rhinitis.    Physical Exam:   GENERAL APPEARANCE:  NAD, hemodynamically stable  T(C): 36.7 (10-11-23 @ 08:58), Max: 36.9 (10-10-23 @ 23:47)  HR: 90 (10-11-23 @ 08:58) (84 - 94)  BP: 97/42 (10-11-23 @ 08:58) (87/48 - 122/74)  RR: 18 (10-11-23 @ 08:58) (17 - 22)  SpO2: 94% (10-11-23 @ 08:58) (92% - 100%)  Wt(kg): --  HEENT:  Head is normocephalic    Skin:  Warm and dry without any rash   NECK:  Supple without lymphadenopathy.   HEART:  Regular rate and rhythm. normal S1 and S2, No M/R/G  LUNGS:  Good ins/exp effort, no W/R/R/C  ABDOMEN:  Soft, nontender, nondistended with good bowel sounds heard  EXTREMITIES:  Without cyanosis, clubbing or edema.   NEUROLOGICAL:  Gross nonfocal       CBC Full  -  ( 11 Oct 2023 08:09 )  WBC Count : 9.16 K/uL  RBC Count : 3.48 M/uL  Hemoglobin : 10.9 g/dL  Hematocrit : 31.7 %  Platelet Count - Automated : 264 K/uL  Mean Cell Volume : 91.1 fl  Mean Cell Hemoglobin : 31.3 pg  Mean Cell Hemoglobin Concentration : 34.4 gm/dL  Auto Neutrophil # : x  Auto Lymphocyte # : x  Auto Monocyte # : x  Auto Eosinophil # : x  Auto Basophil # : x  Auto Neutrophil % : x  Auto Lymphocyte % : x  Auto Monocyte % : x  Auto Eosinophil % : x  Auto Basophil % : x    PT/INR - ( 11 Oct 2023 08:09 )   PT: 50.0 sec;   INR: 4.63 ratio         PTT - ( 10 Oct 2023 11:03 )  PTT:45.6 sec  Urinalysis Basic - ( 11 Oct 2023 08:09 )    Color: x / Appearance: x / SG: x / pH: x  Gluc: 90 mg/dL / Ketone: x  / Bili: x / Urobili: x   Blood: x / Protein: x / Nitrite: x   Leuk Esterase: x / RBC: x / WBC x   Sq Epi: x / Non Sq Epi: x / Bacteria: x      10-11    132<L>  |  105  |  47<H>  ----------------------------<  90  2.9<LL>   |  19<L>  |  0.94    Ca    8.0<L>      11 Oct 2023 08:09    TPro  5.7<L>  /  Alb  2.6<L>  /  TBili  0.8  /  DBili  x   /  AST  37  /  ALT  36  /  AlkPhos  82  10-10      82 y/o female with PMHx of dementia, chronic AFIB on coumadin, s/p PPM, HTN, HLD, glaucoma, hypothyroid who presented to  with CC of weakness, diarrhea and near syncope at home. Patient admitted for ischemic colitis, UTI, septic shock and COVID.        # Septic Shock - Sources of infection UTI/ COVID/ ischemic colitis.    - tele monitoring  - S/p cipro/ flagyl/ zosyn in ER.    - Cont zosyn pending cultures.    - Suspect shock more related to UTI/ ischemic colitis.    - Trend lactate and WBC.      # Ischemic Colitis:    - Right colon on CT.    - Appreciate surgical eval-- no plans for OR for now.    - NPO except meds for now.    - Distended on exam but no significant tenderness on exam.    - Serial abd exams.      #UTI:    - F/u cultures.    - Zosyn as above.      #Coagulopathy:    - Secondary to coumadin      #SHANA:    60/1.8.    Suspect all prerenal.    Patient was on lasix at home.    S/p 2.5 L in ER.    Only 35 cc of urine in bladder.    1 L NSS x 1 now.    NSS @ 100.      #COVID:    PAtient with sx of weakness/ diarrhea but could also be explained by colitis/ UTI.    Trend.    No SOB/ cough/ fever.    Supportive care for now.      #Diarrhea:    Check gi pcr/ cdiff.    May be related to ischemic colitis/ COVID.      #Dementia:    Cont aricept/ namenda.    CO on floors.    Seroquel PRN.      #Afib on coumadin:    AS above.  Hold coumadin.    Cont dig-- check level.    Hold coreg with hypotension.      #HTN:    Hold ACE/ Lasix/ Spironolactone with hypotension.      #Hypothyroid:    Cont synthroid.      #HLD:    Cont statin.      #DVT Proph:  coagulopathic.      #Advanced directives:  as above.  DNR/ DNI.  No pressors.  No central line.    Reason for Admission: weakness, diarrhea    Patient is a 82 y/o/f with PMHx of dementia, chronic AFIB on coumadin, s/p PPM, HTN, HLD, glaucoma, hypothyroid who presented to  with CC of weakness, diarrhea.  History is obtained from patient's family.  Patient lives at home with her sister-- Kathy.  Kathy was out of town last few days but as per family, patient has been declining.  She wasn't eating as much, having some diarrhea.  She had her INR checked which was elevated last week and her coumadin dosing was adjusted.  Today, her sister tried getting her up OOB to clean her up after having diarrhea and she came weak and had near syncope and they lowered her to the ground.  No fall/ trauma.  They brought her to the Er for further evaluation.  In the ER, leonarda found to have hypotension with inital SBP in 50's.  Elevated lactate 3.1.  Patient given 2.5 L NSS.  CT concerning for ischemic colitis-- right colon.  Surgery was consulted who recommended conservative management for now and close f/u exams.  Patient denies abd pain.  Patient also tested positive for COVID-- sister denies sx other than weakness/ diarrhea.       Medical progress: Denies any HA, CP, SOB. No fevers, chills or shakes. Labs and vitals reviewed. Comfortable.    Complaints: no new complaints  State of mind: at baseline  Patient is very sick. notes of pain around the hip joints. No noted BMs. Patient with guarded prognosis. Care discussed with family -  they want patient to be comfortable. Care discussed with GI team -  no interventions at this time.       REVIEW OF SYSTEMS:  - uncomfortable, confused, sick appearing.    Physical Exam:   GENERAL APPEARANCE:  Frail and sick  T(C): 36.7 (10-11-23 @ 08:58), Max: 36.9 (10-10-23 @ 23:47)  HR: 90 (10-11-23 @ 08:58) (84 - 94)  BP: 97/42 (10-11-23 @ 08:58) (87/48 - 122/74)  RR: 18 (10-11-23 @ 08:58) (17 - 22)  SpO2: 94% (10-11-23 @ 08:58) (92% - 100%)  HEENT:  atraumatic  Skin:  thin /  dry  NECK:  Supple without lymphadenopathy.   HEART:  Regular rate and rhythm. normal S1 and S2, No M/R/G  LUNGS:  no BM sounds  ABDOMEN:  Soft, nontender, nondistended with good bowel sounds heard  EXTREMITIES:  Without cyanosis, clubbing or edema.   NEUROLOGICAL:  Gross nonfocal       CBC Full  -  ( 11 Oct 2023 08:09 )  WBC Count : 9.16 K/uL  RBC Count : 3.48 M/uL  Hemoglobin : 10.9 g/dL  Hematocrit : 31.7 %  Platelet Count - Automated : 264 K/uL  Mean Cell Volume : 91.1 fl  Mean Cell Hemoglobin : 31.3 pg  Mean Cell Hemoglobin Concentration : 34.4 gm/dL  Auto Neutrophil # : x  Auto Lymphocyte # : x  Auto Monocyte # : x  Auto Eosinophil # : x  Auto Basophil # : x  Auto Neutrophil % : x  Auto Lymphocyte % : x  Auto Monocyte % : x  Auto Eosinophil % : x  Auto Basophil % : x    PT/INR - ( 11 Oct 2023 08:09 )   PT: 50.0 sec;   INR: 4.63 ratio         PTT - ( 10 Oct 2023 11:03 )  PTT:45.6 sec  Urinalysis Basic - ( 11 Oct 2023 08:09 )    Color: x / Appearance: x / SG: x / pH: x  Gluc: 90 mg/dL / Ketone: x  / Bili: x / Urobili: x   Blood: x / Protein: x / Nitrite: x   Leuk Esterase: x / RBC: x / WBC x   Sq Epi: x / Non Sq Epi: x / Bacteria: x      10-11    132<L>  |  105  |  47<H>  ----------------------------<  90  2.9<LL>   |  19<L>  |  0.94    Ca    8.0<L>      11 Oct 2023 08:09    TPro  5.7<L>  /  Alb  2.6<L>  /  TBili  0.8  /  DBili  x   /  AST  37  /  ALT  36  /  AlkPhos  82  10-10        82 y/o female with PMHx of dementia, chronic AFIB on coumadin, s/p PPM, HTN, HLD, glaucoma, hypothyroid who presented to  with CC of weakness, diarrhea and near syncope at home. Patient admitted for ischemic colitis, UTI, septic shock and COVID.        # Septic Shock - Sources of infection UTI / COVID / ischemic colitis.    - tele monitoring  - IV zosyn  - panculture pending   - Suspect shock more related to UTI/ ischemic colitis.    - lactate trending down /  WBC trending down /  Na is improving    # Ischemic Colitis:  - Right colon on CT  - Appreciate surgical eval-- no plans for OR for now.    - NPO except meds  - Distended on exam but no significant tenderness on exam.    - Serial abd exams.      # UTI:    - F/u cultures.    - Zosyn as above.      # Coagulopathy:    - Secondary to coumadin      # SHANA:    - Suspect all prerenal.    - Patient was on lasix at home.    - s/p 2.5 L in ER.    - NSS @ 100.      #COVID:    PAtient with sx of weakness/ diarrhea but could also be explained by colitis/ UTI.    Trend.    No SOB/ cough/ fever.    Supportive care for now.      # Diarrhea:    - Check gi pcr/ cdiff.    - May be related to ischemic colitis/ COVID.      # Dementia:    Cont aricept/ namenda.    CO on floors.    Seroquel PRN.      # Afib on coumadin:    - hold coumadin   - INR is improving    #HTN:    Hold ACE/ Lasix/ Spironolactone with hypotension.      #Hypothyroid:    - Cont synthroid.      #HLD:    - Cont statin.      #DVT Proph:  coagulopathic.      #Advanced directives:  as above.  DNR/ DNI.  No pressors.  No central line.

## 2023-10-11 NOTE — PROGRESS NOTE ADULT - SUBJECTIVE AND OBJECTIVE BOX
HPI: pt seen and examined this AM manoj no family at bedside, patient resting comfortable at this time, as per RN staff appears to be in pain when being turned, as per family hip pains. [facility] meeting held today please see note     PAIN: ( )Yes   (x )No  denies pain   DYSPNEA: ( ) Yes  (x ) No  Level:    Review of Systems:    Unable to obtain/Limited due to: Dementia     PHYSICAL EXAM:    Vital Signs Last 24 Hrs  T(C): 36.9 (11 Oct 2023 15:47), Max: 36.9 (10 Oct 2023 23:47)  T(F): 98.5 (11 Oct 2023 15:47), Max: 98.5 (11 Oct 2023 15:47)  HR: 87 (11 Oct 2023 15:47) (84 - 90)  BP: 101/49 (11 Oct 2023 15:47) (97/42 - 122/74)  BP(mean): 85 (10 Oct 2023 17:06) (85 - 85)  RR: 19 (11 Oct 2023 15:47) (18 - 20)  SpO2: 95% (11 Oct 2023 15:47) (94% - 99%)    Parameters below as of 11 Oct 2023 15:47  Patient On (Oxygen Delivery Method): nasal cannula  O2 Flow (L/min): 2    PPSV2: 30  %  FAST: unsure of baseline     General: pleasantly confused female in bed, NAD   Mental Status: alert and confused   HEENT: nasal cannula in place   Lungs: diminished b/l   Cardiac: s1s2 +   GI: nontender, nondistended, +BS   : +voiding   Ext: mild edema   Neuro: dementia     LABS:                        10.9   9.16  )-----------( 264      ( 11 Oct 2023 08:09 )             31.7     10-11    132<L>  |  105  |  47<H>  ----------------------------<  90  2.9<LL>   |  19<L>  |  0.94    Ca    8.0<L>      11 Oct 2023 08:09    TPro  5.7<L>  /  Alb  2.6<L>  /  TBili  0.8  /  DBili  x   /  AST  37  /  ALT  36  /  AlkPhos  82  10-10    PT/INR - ( 11 Oct 2023 08:09 )   PT: 50.0 sec;   INR: 4.63 ratio         PTT - ( 10 Oct 2023 11:03 )  PTT:45.6 sec  Albumin: Albumin: 2.6 g/dL (10-10 @ 11:03)      Allergies    No Known Allergies    Intolerances      MEDICATIONS  (STANDING):  atorvastatin 40 milliGRAM(s) Oral at bedtime  digoxin     Tablet 125 MICROGram(s) Oral every other day  donepezil 10 milliGRAM(s) Oral at bedtime  influenza  Vaccine (HIGH DOSE) 0.7 milliLiter(s) IntraMuscular once  latanoprost 0.005% Ophthalmic Solution 1 Drop(s) Both EYES at bedtime  levothyroxine 50 MICROGram(s) Oral daily  memantine 10 milliGRAM(s) Oral two times a day  piperacillin/tazobactam IVPB.. 3.375 Gram(s) IV Intermittent every 8 hours  QUEtiapine 12.5 milliGRAM(s) Oral at bedtime  sodium chloride 0.9% Bolus 1000 milliLiter(s) IV Bolus once  sodium chloride 0.9%. 1000 milliLiter(s) (100 mL/Hr) IV Continuous <Continuous>    MEDICATIONS  (PRN):  acetaminophen     Tablet .. 650 milliGRAM(s) Oral every 6 hours PRN Temp greater or equal to 38C (100.4F), Mild Pain (1 - 3)  aluminum hydroxide/magnesium hydroxide/simethicone Suspension 30 milliLiter(s) Oral every 4 hours PRN Dyspepsia  melatonin 3 milliGRAM(s) Oral at bedtime PRN Insomnia  ondansetron Injectable 4 milliGRAM(s) IV Push every 8 hours PRN Nausea and/or Vomiting  QUEtiapine 12.5 milliGRAM(s) Oral at bedtime PRN agitation

## 2023-10-12 LAB
ALBUMIN SERPL ELPH-MCNC: 2.2 G/DL — LOW (ref 3.3–5)
ALP SERPL-CCNC: 71 U/L — SIGNIFICANT CHANGE UP (ref 40–120)
ALT FLD-CCNC: 34 U/L — SIGNIFICANT CHANGE UP (ref 12–78)
ANION GAP SERPL CALC-SCNC: 5 MMOL/L — SIGNIFICANT CHANGE UP (ref 5–17)
AST SERPL-CCNC: 38 U/L — HIGH (ref 15–37)
BILIRUB SERPL-MCNC: 0.6 MG/DL — SIGNIFICANT CHANGE UP (ref 0.2–1.2)
BUN SERPL-MCNC: 35 MG/DL — HIGH (ref 7–23)
CALCIUM SERPL-MCNC: 8.5 MG/DL — SIGNIFICANT CHANGE UP (ref 8.5–10.1)
CHLORIDE SERPL-SCNC: 111 MMOL/L — HIGH (ref 96–108)
CO2 SERPL-SCNC: 19 MMOL/L — LOW (ref 22–31)
CREAT SERPL-MCNC: 0.72 MG/DL — SIGNIFICANT CHANGE UP (ref 0.5–1.3)
EGFR: 83 ML/MIN/1.73M2 — SIGNIFICANT CHANGE UP
GLUCOSE SERPL-MCNC: 76 MG/DL — SIGNIFICANT CHANGE UP (ref 70–99)
HCT VFR BLD CALC: 37.7 % — SIGNIFICANT CHANGE UP (ref 34.5–45)
HGB BLD-MCNC: 12.7 G/DL — SIGNIFICANT CHANGE UP (ref 11.5–15.5)
INR BLD: 2.14 RATIO — HIGH (ref 0.85–1.18)
MCHC RBC-ENTMCNC: 30.9 PG — SIGNIFICANT CHANGE UP (ref 27–34)
MCHC RBC-ENTMCNC: 33.7 GM/DL — SIGNIFICANT CHANGE UP (ref 32–36)
MCV RBC AUTO: 91.7 FL — SIGNIFICANT CHANGE UP (ref 80–100)
PLATELET # BLD AUTO: 292 K/UL — SIGNIFICANT CHANGE UP (ref 150–400)
POTASSIUM SERPL-MCNC: 3.5 MMOL/L — SIGNIFICANT CHANGE UP (ref 3.5–5.3)
POTASSIUM SERPL-SCNC: 3.5 MMOL/L — SIGNIFICANT CHANGE UP (ref 3.5–5.3)
PROT SERPL-MCNC: 5.4 GM/DL — LOW (ref 6–8.3)
PROTHROM AB SERPL-ACNC: 23.6 SEC — HIGH (ref 9.5–13)
RBC # BLD: 4.11 M/UL — SIGNIFICANT CHANGE UP (ref 3.8–5.2)
RBC # FLD: 13.5 % — SIGNIFICANT CHANGE UP (ref 10.3–14.5)
SODIUM SERPL-SCNC: 135 MMOL/L — SIGNIFICANT CHANGE UP (ref 135–145)
WBC # BLD: 12.26 K/UL — HIGH (ref 3.8–10.5)
WBC # FLD AUTO: 12.26 K/UL — HIGH (ref 3.8–10.5)

## 2023-10-12 PROCEDURE — 99232 SBSQ HOSP IP/OBS MODERATE 35: CPT

## 2023-10-12 RX ORDER — WARFARIN SODIUM 2.5 MG/1
3 TABLET ORAL ONCE
Refills: 0 | Status: COMPLETED | OUTPATIENT
Start: 2023-10-12 | End: 2023-10-12

## 2023-10-12 RX ADMIN — PIPERACILLIN AND TAZOBACTAM 25 GRAM(S): 4; .5 INJECTION, POWDER, LYOPHILIZED, FOR SOLUTION INTRAVENOUS at 06:41

## 2023-10-12 RX ADMIN — LATANOPROST 1 DROP(S): 0.05 SOLUTION/ DROPS OPHTHALMIC; TOPICAL at 22:19

## 2023-10-12 RX ADMIN — SODIUM CHLORIDE 100 MILLILITER(S): 9 INJECTION INTRAMUSCULAR; INTRAVENOUS; SUBCUTANEOUS at 20:14

## 2023-10-12 RX ADMIN — ATORVASTATIN CALCIUM 40 MILLIGRAM(S): 80 TABLET, FILM COATED ORAL at 22:17

## 2023-10-12 RX ADMIN — QUETIAPINE FUMARATE 12.5 MILLIGRAM(S): 200 TABLET, FILM COATED ORAL at 21:59

## 2023-10-12 RX ADMIN — DONEPEZIL HYDROCHLORIDE 10 MILLIGRAM(S): 10 TABLET, FILM COATED ORAL at 22:17

## 2023-10-12 RX ADMIN — SODIUM CHLORIDE 100 MILLILITER(S): 9 INJECTION INTRAMUSCULAR; INTRAVENOUS; SUBCUTANEOUS at 10:16

## 2023-10-12 RX ADMIN — MEMANTINE HYDROCHLORIDE 10 MILLIGRAM(S): 10 TABLET ORAL at 22:17

## 2023-10-12 RX ADMIN — WARFARIN SODIUM 3 MILLIGRAM(S): 2.5 TABLET ORAL at 21:59

## 2023-10-12 RX ADMIN — PIPERACILLIN AND TAZOBACTAM 25 GRAM(S): 4; .5 INJECTION, POWDER, LYOPHILIZED, FOR SOLUTION INTRAVENOUS at 22:00

## 2023-10-12 RX ADMIN — Medication 3 MILLIGRAM(S): at 22:17

## 2023-10-12 NOTE — PROGRESS NOTE ADULT - SUBJECTIVE AND OBJECTIVE BOX
Reason for Admission: weakness, diarrhea    Patient is a 82 y/o/f with PMHx of dementia, chronic AFIB on coumadin, s/p PPM, HTN, HLD, glaucoma, hypothyroid who presented to  with CC of weakness, diarrhea.  History is obtained from patient's family.  Patient lives at home with her sister-- Kathy.  Kathy was out of town last few days but as per family, patient has been declining.  She wasn't eating as much, having some diarrhea.  She had her INR checked which was elevated last week and her coumadin dosing was adjusted.  Today, her sister tried getting her up OOB to clean her up after having diarrhea and she came weak and had near syncope and they lowered her to the ground.  No fall/ trauma.  They brought her to the Er for further evaluation.  In the ER, leonarda found to have hypotension with inital SBP in 50's.  Elevated lactate 3.1.  Patient given 2.5 L NSS.  CT concerning for ischemic colitis-- right colon.  Surgery was consulted who recommended conservative management for now and close f/u exams.  Patient denies abd pain.  Patient also tested positive for COVID-- sister denies sx other than weakness/ diarrhea.       Medical progress: patient is comfortable. Denies any HA, CP, SOB. no fevers, chills or shakes. Labs and vitals reviewed. comfortable.   Complaints: mild abdominal pain  State of mind: at baseline  Patient is very sick. notes of pain around the hip joints. No noted BMs. Patient with guarded prognosis. Care discussed with family -  they want patient to be comfortable. Care discussed with GI team -  no interventions at this time.       REVIEW OF SYSTEMS:  - uncomfortable, confused, sick appearing.    Physical Exam:   GENERAL APPEARANCE:  Frail and sick  T(C): 36.7 (10-11-23 @ 08:58), Max: 36.9 (10-10-23 @ 23:47)  HR: 90 (10-11-23 @ 08:58) (84 - 94)  BP: 97/42 (10-11-23 @ 08:58) (87/48 - 122/74)  RR: 18 (10-11-23 @ 08:58) (17 - 22)  SpO2: 94% (10-11-23 @ 08:58) (92% - 100%)  HEENT:  atraumatic  Skin:  thin /  dry  NECK:  Supple without lymphadenopathy.   HEART:  Regular rate and rhythm. normal S1 and S2, No M/R/G  LUNGS:  no BM sounds  ABDOMEN:  Soft, nontender, nondistended with good bowel sounds heard  EXTREMITIES:  Without cyanosis, clubbing or edema.   NEUROLOGICAL:  Gross nonfocal     Labs:     CBC Full  -  ( 12 Oct 2023 08:15 )  WBC Count : 12.26 K/uL  RBC Count : 4.11 M/uL  Hemoglobin : 12.7 g/dL  Hematocrit : 37.7 %  Platelet Count - Automated : 292 K/uL  Mean Cell Volume : 91.7 fl  Mean Cell Hemoglobin : 30.9 pg  Mean Cell Hemoglobin Concentration : 33.7 gm/dL  Auto Neutrophil # : x  Auto Lymphocyte # : x  Auto Monocyte # : x  Auto Eosinophil # : x  Auto Basophil # : x  Auto Neutrophil % : x  Auto Lymphocyte % : x  Auto Monocyte % : x  Auto Eosinophil % : x  Auto Basophil % : x    PT/INR - ( 12 Oct 2023 08:15 )   PT: 23.6 sec;   INR: 2.14 ratio        Urinalysis Basic - ( 12 Oct 2023 08:15 )    Color: x / Appearance: x / SG: x / pH: x  Gluc: 76 mg/dL / Ketone: x  / Bili: x / Urobili: x   Blood: x / Protein: x / Nitrite: x   Leuk Esterase: x / RBC: x / WBC x   Sq Epi: x / Non Sq Epi: x / Bacteria: x      10-12    135  |  111<H>  |  35<H>  ----------------------------<  76  3.5   |  19<L>  |  0.72    Ca    8.5      12 Oct 2023 08:15    TPro  5.4<L>  /  Alb  2.2<L>  /  TBili  0.6  /  DBili  x   /  AST  38<H>  /  ALT  34  /  AlkPhos  71  10-12    82 y/o female with PMHx of dementia, chronic AFIB on coumadin, s/p PPM, HTN, HLD, glaucoma, hypothyroid who presented to  with CC of weakness, diarrhea and near syncope at home. Patient admitted for ischemic colitis, UTI, septic shock and COVID.        # Septic Shock - Sources of infection UTI / COVID / ischemic colitis.    - tele monitoring  - IV zosyn  - panculture pending   - Suspect shock more related to UTI/ ischemic colitis.    - lactate trending down /  WBC trending down /  Na is improving    # Ischemic Colitis:  - Right colon on CT  - Appreciate surgical eval-- no plans for OR for now.    - NPO except meds  - Distended on exam but no significant tenderness on exam.    - Serial abd exams.      # UTI GNR:    - F/u cultures.    - Zosyn as above.      # Coagulopathy:    - Secondary to coumadin      # SHANA:    - Suspect all prerenal.    - Patient was on lasix at home.    - s/p 2.5 L in ER.    - NSS @ 100.      #COVID:    PAtient with sx of weakness/ diarrhea but could also be explained by colitis/ UTI.    Trend.    No SOB/ cough/ fever.    Supportive care for now.      # Diarrhea:    - Check gi pcr/ cdiff.    - May be related to ischemic colitis/ COVID.      # Dementia:    Cont aricept/ namenda.    CO on floors.    Seroquel PRN.      # Afib on coumadin:    - hold coumadin   - INR is improving    #HTN:    Hold ACE/ Lasix/ Spironolactone with hypotension.      #Hypothyroid:    - Cont synthroid.      #HLD:    - Cont statin.      #DVT Proph:  coagulopathic.      #Advanced directives:  as above.  DNR/ DNI.  No pressors.  No central line.

## 2023-10-12 NOTE — PROGRESS NOTE ADULT - ASSESSMENT
84 y/o female with PMHx of dementia, chronic AFIB on coumadin, s/p PPM, HTN, HLD, glaucoma, hypothyroid who presented to  with CC of weakness, diarrhea.  History is obtained from patient's family.  Patient lives at home with her sister-- Kathy.  Kathy was out of town last few days but as per family, patient has been declining.  She wasn't eating as much, having some diarrhea.  She had her INR checked which was elevated last week and her coumadin dosing was adjusted. Pts sister tried getting her up OOB to clean her up after having diarrhea and she came weak and had near syncope and they lowered her to the ground.  No fall/ trauma.  They brought her to the Er for further evaluation.  In the ER, leonarda found to have hypotension with inital SBP in 50's.  Elevated lactate 3.1.  Patient given 2.5 L NSS.  CT concerning for ischemic colitis-- right colon.  Surgery was consulted who recommended conservative management for now and close f/u exams.  Patient denies abd pain.  Patient also tested positive for COVID-- sister denies sx other than weakness/ diarrhea.  Patient also found to have UTI with postiive UA.  Patient pancultured and started on cipro/ flagyl.  Also given 1 dose zosyn.      1. Sepsis. Ischemic colitis. Pyuria. UTI. Covid-19 Viral Syndrome.   - gi, surgical f/u noted  - palliative care following  - on IV zosyn [id number] #2-3   - f/u urine cx - gnrs f/u final cx f/u blood cx  - not hypoxic does not require remdesivir, decadron at this time  - isolation precautions  - fu cbc  - monitor temps  - tolerating abx well so far; no side effects noted  - reason for abx use and side effects reviewed with patient  - supportive care    2. other issues - care per medicine

## 2023-10-12 NOTE — PROGRESS NOTE ADULT - SUBJECTIVE AND OBJECTIVE BOX
Date of service: 10-12-23 @ 14:23      pt seen and examined  laying in bed, nad  no fevers  no resp distress    ROS: unable to obtain d/t medical condition    MEDICATIONS  (STANDING):  atorvastatin 40 milliGRAM(s) Oral at bedtime  digoxin     Tablet 125 MICROGram(s) Oral every other day  donepezil 10 milliGRAM(s) Oral at bedtime  influenza  Vaccine (HIGH DOSE) 0.7 milliLiter(s) IntraMuscular once  latanoprost 0.005% Ophthalmic Solution 1 Drop(s) Both EYES at bedtime  levothyroxine 50 MICROGram(s) Oral daily  memantine 10 milliGRAM(s) Oral two times a day  piperacillin/tazobactam IVPB.. 3.375 Gram(s) IV Intermittent every 8 hours  QUEtiapine 12.5 milliGRAM(s) Oral at bedtime  sodium chloride 0.9% Bolus 1000 milliLiter(s) IV Bolus once  sodium chloride 0.9%. 1000 milliLiter(s) (100 mL/Hr) IV Continuous <Continuous>      Vital Signs Last 24 Hrs  T(C): 36.9 (12 Oct 2023 08:57), Max: 36.9 (11 Oct 2023 15:47)  T(F): 98.4 (12 Oct 2023 08:57), Max: 98.5 (11 Oct 2023 15:47)  HR: 96 (12 Oct 2023 08:57) (87 - 96)  BP: 147/80 (12 Oct 2023 08:57) (101/49 - 147/80)  BP(mean): --  RR: 18 (12 Oct 2023 08:57) (18 - 19)  SpO2: 96% (12 Oct 2023 08:57) (95% - 96%)    Parameters below as of 12 Oct 2023 08:57  Patient On (Oxygen Delivery Method): nasal cannula  O2 Flow (L/min): 2    PE:  Constitutional: frail looking  HEENT: NC/AT, EOMI, PERRLA, conjunctivae clear; ears and nose atraumatic; pharynx benign  Neck: supple; thyroid not palpable  Back: no tenderness  Respiratory: respiratory effort normal; clear to auscultation  Cardiovascular: S1S2 regular, no murmurs  Abdomen: soft, not tender, not distended, positive BS; liver and spleen WNL  Genitourinary: no suprapubic tenderness  Lymphatic: no LN palpable  Musculoskeletal: no muscle tenderness, no joint swelling or tenderness  Extremities: no pedal edema  Neurological/ Psychiatric: moving all extremities  Skin: no rashes; no palpable lesions    Labs: all available labs reviewed                                   12.7 12.26 )-----------( 292      ( 12 Oct 2023 08:15 )             37.7     10-12    135  |  111<H>  |  35<H>  ----------------------------<  76  3.5   |  19<L>  |  0.72    Ca    8.5      12 Oct 2023 08:15    TPro  5.4<L>  /  Alb  2.2<L>  /  TBili  0.6  /  DBili  x   /  AST  38<H>  /  ALT  34  /  AlkPhos  71  10-12        Urinalysis Basic - ( 11 Oct 2023 08:09 )    Color: x / Appearance: x / SG: x / pH: x  Gluc: 90 mg/dL / Ketone: x  / Bili: x / Urobili: x   Blood: x / Protein: x / Nitrite: x   Leuk Esterase: x / RBC: x / WBC x   Sq Epi: x / Non Sq Epi: x / Bacteria: x    Culture - Urine (10.10.23 @ 11:03)   Specimen Source: Clean Catch Clean Catch (Midstream)  Culture Results:   >100,000 CFU/ml Gram Negative Rods  Culture - Blood (10.10.23 @ 11:03)   Specimen Source: .Blood Blood-Peripheral  Culture Results:   No growth at 24 hours  Culture - Blood (10.10.23 @ 11:03)   Specimen Source: .Blood Blood-Peripheral  Culture Results:   No growth at 24 hours      Radiology: all available radiological tests reviewed  < from: CT Abdomen and Pelvis w/ IV Cont (10.10.23 @ 12:13) >    ACC: [id number] EXAM:  CT ABDOMEN AND PELVIS IC   ORDERED BY: CALIXTO THOMPSON     ACC: [id number] EXAM:  CT CHEST IC   ORDERED BY: CALIXTO THOMPSON     PROCEDURE DATE:  10/10/2023          INTERPRETATION:  CLINICAL INFORMATION: Hypotension, left-sidedabdominal   pain    COMPARISON: None.    CONTRAST/COMPLICATIONS:  IV Contrast: Omnipaque 350 (accession [id number]), IV contrast documented   in unlinked concurrent exam (accession [id number])  90 cc administered   10   cc discarded  Oral Contrast: NONE  Complications: None reported at time of study completion    PROCEDURE:  CT of the Chest, Abdomen and Pelvis was performed.  Sagittal and coronal reformats were performed.    FINDINGS:  CHEST:  LUNGS AND LARGE AIRWAYS: Patent central airways. No consolidation.  PLEURA: Trace right pleural effusion.  VESSELS: Normal caliber thoracic aorta. Slightly dilated main pulmonary   artery.  HEART: Cardiomegaly with biatrial enlargement. No pericardial effusion.   Coronary artery calcifications.  MEDIASTINUMAND JC: No lymphadenopathy.  CHEST WALL AND LOWER NECK: Left chest wall pacemaker with the right   atrium and right ventricle. Heterogeneous and enlarged right thyroid   lobe. Irregular hypoattenuating lesion posterior to the left thyroid   lobe, possibly representing a thyroid nodule or parathyroid lesion   measuring 1.2 x 0.8 cm (2; 5).    ABDOMEN AND PELVIS:  LIVER: Scattered hypoattenuating liver lesions, incompletely   characterized, likely benign cysts and/or hemangiomas.  BILE DUCTS: Normal caliber.  GALLBLADDER: Within normal limits.  SPLEEN: Within normal limits.  PANCREAS: Within normal limits.  ADRENALS: Within normal limits.  KIDNEYS/URETERS: Within normal limits.    BLADDER: Within normal limits.  REPRODUCTIVE ORGANS: Fibroid uterus.    BOWEL: Distended and fluid-filled ascending colon with pneumatosis,   suspicious for ischemic colitis. Diffusely related to fluid-filled loops   of small bowel extending to the level of ileocecal valve, likely   representing reactive ileus. Appendix is normal.  PERITONEUM: Small volume ascites  VESSELS: Normal caliber abdominal aorta with mild scattered calcified   atherosclerotic plaque. The celiac artery, superior mesenteric artery,   and inferior mesenteric artery are grossly patent. The superior   mesenteric vein, portal vein, and splenic vein are grossly patent.  RETROPERITONEUM/LYMPH NODES: No lymphadenopathy.  ABDOMINAL WALL: Moderate anasarca. Moderate fat-containing umbilical   hernia with ascites fluid within the hernia sac.  BONES: Severe degenerative changes of bilateral hips. Degenerative   changes throughout the thoracolumbar spine.    IMPRESSION:  1.  Fluid-filled ascending colon pneumatosis, suspicious for acute   ischemic colitis.  2.  Diffusely distended small bowel loops likely representing a reactive   ileus. No transition point to suggest a small bowel obstruction.  3.  Small volume ascites, possibly reactive.    Findings of ischemic colitis were discussed with Dr. CALIXTO THOMPSON   10/10/2023 12:59 PM Mauricio Neal with read back confirmation.    --- End of Report ---    < end of copied text >    Advanced directives addressed: full resuscitation

## 2023-10-13 LAB
-  AMIKACIN: SIGNIFICANT CHANGE UP
-  AMOXICILLIN/CLAVULANIC ACID: SIGNIFICANT CHANGE UP
-  AMPICILLIN/SULBACTAM: SIGNIFICANT CHANGE UP
-  AMPICILLIN: SIGNIFICANT CHANGE UP
-  AMPICILLIN: SIGNIFICANT CHANGE UP
-  AZTREONAM: SIGNIFICANT CHANGE UP
-  CEFAZOLIN: SIGNIFICANT CHANGE UP
-  CEFEPIME: SIGNIFICANT CHANGE UP
-  CEFOXITIN: SIGNIFICANT CHANGE UP
-  CEFTRIAXONE: SIGNIFICANT CHANGE UP
-  CEFUROXIME: SIGNIFICANT CHANGE UP
-  CIPROFLOXACIN: SIGNIFICANT CHANGE UP
-  CIPROFLOXACIN: SIGNIFICANT CHANGE UP
-  ERTAPENEM: SIGNIFICANT CHANGE UP
-  GENTAMICIN: SIGNIFICANT CHANGE UP
-  IMIPENEM: SIGNIFICANT CHANGE UP
-  LEVOFLOXACIN: SIGNIFICANT CHANGE UP
-  LEVOFLOXACIN: SIGNIFICANT CHANGE UP
-  MEROPENEM: SIGNIFICANT CHANGE UP
-  NITROFURANTOIN: SIGNIFICANT CHANGE UP
-  NITROFURANTOIN: SIGNIFICANT CHANGE UP
-  PIPERACILLIN/TAZOBACTAM: SIGNIFICANT CHANGE UP
-  TETRACYCLINE: SIGNIFICANT CHANGE UP
-  TOBRAMYCIN: SIGNIFICANT CHANGE UP
-  TRIMETHOPRIM/SULFAMETHOXAZOLE: SIGNIFICANT CHANGE UP
-  VANCOMYCIN: SIGNIFICANT CHANGE UP
ANION GAP SERPL CALC-SCNC: 8 MMOL/L — SIGNIFICANT CHANGE UP (ref 5–17)
APTT BLD: 32.3 SEC — SIGNIFICANT CHANGE UP (ref 24.5–35.6)
BUN SERPL-MCNC: 23 MG/DL — SIGNIFICANT CHANGE UP (ref 7–23)
C DIFF BY PCR RESULT: SIGNIFICANT CHANGE UP
CALCIUM SERPL-MCNC: 8.2 MG/DL — LOW (ref 8.5–10.1)
CHLORIDE SERPL-SCNC: 114 MMOL/L — HIGH (ref 96–108)
CO2 SERPL-SCNC: 18 MMOL/L — LOW (ref 22–31)
CREAT SERPL-MCNC: 0.56 MG/DL — SIGNIFICANT CHANGE UP (ref 0.5–1.3)
CULTURE RESULTS: SIGNIFICANT CHANGE UP
EGFR: 90 ML/MIN/1.73M2 — SIGNIFICANT CHANGE UP
GLUCOSE SERPL-MCNC: 93 MG/DL — SIGNIFICANT CHANGE UP (ref 70–99)
HCT VFR BLD CALC: 35.9 % — SIGNIFICANT CHANGE UP (ref 34.5–45)
HGB BLD-MCNC: 12.5 G/DL — SIGNIFICANT CHANGE UP (ref 11.5–15.5)
INR BLD: 2.72 RATIO — HIGH (ref 0.85–1.18)
MCHC RBC-ENTMCNC: 31.8 PG — SIGNIFICANT CHANGE UP (ref 27–34)
MCHC RBC-ENTMCNC: 34.8 GM/DL — SIGNIFICANT CHANGE UP (ref 32–36)
MCV RBC AUTO: 91.3 FL — SIGNIFICANT CHANGE UP (ref 80–100)
METHOD TYPE: SIGNIFICANT CHANGE UP
METHOD TYPE: SIGNIFICANT CHANGE UP
ORGANISM # SPEC MICROSCOPIC CNT: SIGNIFICANT CHANGE UP
PLATELET # BLD AUTO: 337 K/UL — SIGNIFICANT CHANGE UP (ref 150–400)
POTASSIUM SERPL-MCNC: 3.3 MMOL/L — LOW (ref 3.5–5.3)
POTASSIUM SERPL-SCNC: 3.3 MMOL/L — LOW (ref 3.5–5.3)
PROTHROM AB SERPL-ACNC: 29.9 SEC — HIGH (ref 9.5–13)
RBC # BLD: 3.93 M/UL — SIGNIFICANT CHANGE UP (ref 3.8–5.2)
RBC # FLD: 13.6 % — SIGNIFICANT CHANGE UP (ref 10.3–14.5)
SODIUM SERPL-SCNC: 140 MMOL/L — SIGNIFICANT CHANGE UP (ref 135–145)
SPECIMEN SOURCE: SIGNIFICANT CHANGE UP
WBC # BLD: 14.45 K/UL — HIGH (ref 3.8–10.5)
WBC # FLD AUTO: 14.45 K/UL — HIGH (ref 3.8–10.5)

## 2023-10-13 PROCEDURE — 99233 SBSQ HOSP IP/OBS HIGH 50: CPT

## 2023-10-13 RX ORDER — WARFARIN SODIUM 2.5 MG/1
3 TABLET ORAL AT BEDTIME
Refills: 0 | Status: COMPLETED | OUTPATIENT
Start: 2023-10-13 | End: 2023-10-13

## 2023-10-13 RX ORDER — CARVEDILOL PHOSPHATE 80 MG/1
3.12 CAPSULE, EXTENDED RELEASE ORAL EVERY 12 HOURS
Refills: 0 | Status: DISCONTINUED | OUTPATIENT
Start: 2023-10-13 | End: 2023-10-23

## 2023-10-13 RX ORDER — POTASSIUM CHLORIDE 20 MEQ
40 PACKET (EA) ORAL ONCE
Refills: 0 | Status: COMPLETED | OUTPATIENT
Start: 2023-10-13 | End: 2023-10-13

## 2023-10-13 RX ORDER — LISINOPRIL 2.5 MG/1
5 TABLET ORAL DAILY
Refills: 0 | Status: DISCONTINUED | OUTPATIENT
Start: 2023-10-13 | End: 2023-10-23

## 2023-10-13 RX ADMIN — LATANOPROST 1 DROP(S): 0.05 SOLUTION/ DROPS OPHTHALMIC; TOPICAL at 22:50

## 2023-10-13 RX ADMIN — Medication 40 MILLIEQUIVALENT(S): at 13:17

## 2023-10-13 RX ADMIN — DONEPEZIL HYDROCHLORIDE 10 MILLIGRAM(S): 10 TABLET, FILM COATED ORAL at 22:41

## 2023-10-13 RX ADMIN — ATORVASTATIN CALCIUM 40 MILLIGRAM(S): 80 TABLET, FILM COATED ORAL at 22:38

## 2023-10-13 RX ADMIN — CARVEDILOL PHOSPHATE 3.12 MILLIGRAM(S): 80 CAPSULE, EXTENDED RELEASE ORAL at 17:59

## 2023-10-13 RX ADMIN — MEMANTINE HYDROCHLORIDE 10 MILLIGRAM(S): 10 TABLET ORAL at 12:17

## 2023-10-13 RX ADMIN — PIPERACILLIN AND TAZOBACTAM 25 GRAM(S): 4; .5 INJECTION, POWDER, LYOPHILIZED, FOR SOLUTION INTRAVENOUS at 06:32

## 2023-10-13 RX ADMIN — Medication 125 MICROGRAM(S): at 12:16

## 2023-10-13 RX ADMIN — LISINOPRIL 5 MILLIGRAM(S): 2.5 TABLET ORAL at 18:29

## 2023-10-13 RX ADMIN — PIPERACILLIN AND TAZOBACTAM 25 GRAM(S): 4; .5 INJECTION, POWDER, LYOPHILIZED, FOR SOLUTION INTRAVENOUS at 13:12

## 2023-10-13 RX ADMIN — Medication 50 MICROGRAM(S): at 06:31

## 2023-10-13 RX ADMIN — MEMANTINE HYDROCHLORIDE 10 MILLIGRAM(S): 10 TABLET ORAL at 22:41

## 2023-10-13 RX ADMIN — WARFARIN SODIUM 3 MILLIGRAM(S): 2.5 TABLET ORAL at 22:39

## 2023-10-13 RX ADMIN — CARVEDILOL PHOSPHATE 3.12 MILLIGRAM(S): 80 CAPSULE, EXTENDED RELEASE ORAL at 22:41

## 2023-10-13 RX ADMIN — SODIUM CHLORIDE 100 MILLILITER(S): 9 INJECTION INTRAMUSCULAR; INTRAVENOUS; SUBCUTANEOUS at 17:59

## 2023-10-13 RX ADMIN — QUETIAPINE FUMARATE 12.5 MILLIGRAM(S): 200 TABLET, FILM COATED ORAL at 22:38

## 2023-10-13 RX ADMIN — SODIUM CHLORIDE 100 MILLILITER(S): 9 INJECTION INTRAMUSCULAR; INTRAVENOUS; SUBCUTANEOUS at 05:59

## 2023-10-13 RX ADMIN — Medication 3 MILLIGRAM(S): at 22:38

## 2023-10-13 RX ADMIN — PIPERACILLIN AND TAZOBACTAM 25 GRAM(S): 4; .5 INJECTION, POWDER, LYOPHILIZED, FOR SOLUTION INTRAVENOUS at 22:38

## 2023-10-13 NOTE — CHART NOTE - NSCHARTNOTEFT_GEN_A_CORE
HPI: As per medical record,   82 y/o female with PMHx of dementia, chronic AFIB on coumadin, s/p PPM, HTN, HLD, glaucoma, hypothyroid who presented to  with CC of weakness, diarrhea.  History is obtained from patient's family.  Patient lives at home with her sister-- Kathy.  Kathy was out of town last few days but as per family, patient has been declining.  She wasn't eating as much, having some diarrhea.  She had her INR checked which was elevated last week and her coumadin dosing was adjusted.  Today, her sister tried getting her up OOB to clean her up after having diarrhea and she came weak and had near syncope and they lowered her to the ground.  No fall/ trauma.  They brought her to the Er for further evaluation.  In the ER, leonarda found to have hypotension with inital SBP in 50's.  Elevated lactate 3.1.  Patient given 2.5 L NSS.  CT concerning for ischemic colitis-- right colon.  Surgery was consulted who recommended conservative management for now and close f/u exams.  Patient denies abd pain.  Patient also tested positive for COVID-- sister denies sx other than weakness/ diarrhea.  Patient also found to have UTI with postiive UA.  Patient pancultured and started on cipro/ flagyl.  Also given 1 dose zosyn.  Patient seen by ICU with septic shock-- stated okay for floors.   (10 Oct 2023 16:10)      PERTINENT PMH REVIEWED:  [ x ] YES [ ] NO           Primary Contact: niece Kathy ([phone]), sister Wero ([phone])    HCP [ x ] Surrogate [   ] Guardian [   ]    Mental Status: [ x ] Alert  [  ] Oriented [ x ] Confused [  ] Lethargic   Concerns of Depression [  ] unable to assess at this time  Anxiety [   ] unable to assess at this time  Baseline ADLs (prior to admission):  Independent [ x ] moderately [ ] fully   Dependent   [ ] moderately [ ]fully   - needed someone with her for safety    Family Meeting attendees: Pt's niece and sister participated in discussion with Pall NP Shefali.     Anticipated Grief: Patient[  ] Family [ x ]    Caregiver [place] Assessed: Yes [ x ] No [  ]    Oriental orthodox: None identified.     Spiritual Concerns: none reported.  available PRN.    Goals of Care: Comfort [  ] Rehabilitation [ x ] Curative [  ] Life Prolonging [  ]    Previous Services:    ADVANCE DIRECTIVES:  [x] YES [ ] NO    - Health Care proxy on file naming mele Cantu as primary & sister Wero as alternate.    Anticipated D/C Plan:                     Summary: HPI: As per medical record,   84 y/o female with PMHx of dementia, chronic AFIB on coumadin, s/p PPM, HTN, HLD, glaucoma, hypothyroid who presented to  with CC of weakness, diarrhea.  History is obtained from patient's family.  Patient lives at home with her sister-- Kathy.  Kathy was out of town last few days but as per family, patient has been declining.  She wasn't eating as much, having some diarrhea.  She had her INR checked which was elevated last week and her coumadin dosing was adjusted.  Today, her sister tried getting her up OOB to clean her up after having diarrhea and she came weak and had near syncope and they lowered her to the ground.  No fall/ trauma.  They brought her to the Er for further evaluation.  In the ER, leonarda found to have hypotension with inital SBP in 50's.  Elevated lactate 3.1.  Patient given 2.5 L NSS.  CT concerning for ischemic colitis-- right colon.  Surgery was consulted who recommended conservative management for now and close f/u exams.  Patient denies abd pain.  Patient also tested positive for COVID-- sister denies sx other than weakness/ diarrhea.  Patient also found to have UTI with postiive UA.  Patient pancultured and started on cipro/ flagyl.  Also given 1 dose zosyn.  Patient seen by ICU with septic shock-- stated okay for floors.   (10 Oct 2023 16:10)      PERTINENT PMH REVIEWED:  [ x ] YES [ ] NO           Primary Contact: niece Alondra ([phone]), sister Wero ([phone])    HCP [ x ] Surrogate [   ] Guardian [   ]    Mental Status: [ x ] Alert  [  ] Oriented [ x ] Confused [  ] Lethargic   Concerns of Depression [  ] unable to assess at this time  Anxiety [   ] unable to assess at this time  Baseline ADLs (prior to admission):  Independent [ x ] moderately [ ] fully   Dependent   [ ] moderately [ ]fully   - needed someone with her for safety    Family Meeting attendees: Pt's niece and sister participated in discussion with Pall NP Shefali.     Anticipated Grief: Patient[  ] Family [ x ]    Caregiver [place] Assessed: Yes [ x ] No [  ]    Mormon: None identified.     Spiritual Concerns: none reported.  available PRN.    Goals of Care: Comfort [  ] Rehabilitation [ x ] Curative [  ] Life Prolonging [  ]    Previous Services:    ADVANCE DIRECTIVES:  [x] YES [ ] NO    - Health Care proxy on file naming mele Cantu as primary & sister Wero as alternate.   - MOLST reflecting DNR/DNI wishes    Anticipated D/C Plan: [facility] placement                      Summary: SW reached out to pt's mele Cantu ([phone]) to follow up and offer support. Palliative SW role explained. Alondra acknowledges speaking with Pall NP Shefali. She explains that they were told pt's labs were improving and that they would like to give her some time to improve. Alondra confirms that the plan is for [facility] placement at which time they will assess her progress and invoke hospice/comfort if they feel its time. Alondar expressed understanding that they can complete new MOLST to reflect comfort measures when they are ready to do so. Emotional support provided. Our team will continue to follow.

## 2023-10-13 NOTE — PHYSICAL THERAPY INITIAL EVALUATION ADULT - GENERAL OBSERVATIONS, REHAB EVAL
Pt. received supine in bed in isolation room + O2 nc + tele. Pt. with dementia unable to provide her PLOF but able to follow one step simple command.  Bed mob with Min A, Transfers to BSC with RW Min A 5 ft + alarm.

## 2023-10-13 NOTE — PHYSICAL THERAPY INITIAL EVALUATION ADULT - PLANNED THERAPY INTERVENTIONS, PT EVAL
balance training/bed mobility training/gait training/neuromuscular re-education/strengthening/transfer training
coagulation(Bleeding disorder R/T clinical cond/anti-coags)

## 2023-10-13 NOTE — PROGRESS NOTE ADULT - SUBJECTIVE AND OBJECTIVE BOX
Date of service: 10-13-23 @ 10:43    pt seen and examined  laying in bed, nad  no fevers    ROS: unable to obtain d/t medical condition    MEDICATIONS  (STANDING):  atorvastatin 40 milliGRAM(s) Oral at bedtime  digoxin     Tablet 125 MICROGram(s) Oral every other day  donepezil 10 milliGRAM(s) Oral at bedtime  influenza  Vaccine (HIGH DOSE) 0.7 milliLiter(s) IntraMuscular once  latanoprost 0.005% Ophthalmic Solution 1 Drop(s) Both EYES at bedtime  levothyroxine 50 MICROGram(s) Oral daily  memantine 10 milliGRAM(s) Oral two times a day  piperacillin/tazobactam IVPB.. 3.375 Gram(s) IV Intermittent every 8 hours  potassium chloride    Tablet ER 40 milliEquivalent(s) Oral once  QUEtiapine 12.5 milliGRAM(s) Oral at bedtime  sodium chloride 0.9% Bolus 1000 milliLiter(s) IV Bolus once  sodium chloride 0.9%. 1000 milliLiter(s) (100 mL/Hr) IV Continuous <Continuous>      Vital Signs Last 24 Hrs  T(C): 36.7 (13 Oct 2023 09:45), Max: 37.1 (12 Oct 2023 17:10)  T(F): 98 (13 Oct 2023 09:45), Max: 98.8 (12 Oct 2023 17:10)  HR: 93 (13 Oct 2023 09:45) (90 - 100)  BP: 167/88 (13 Oct 2023 09:45) (140/87 - 167/88)  BP(mean): --  RR: 19 (13 Oct 2023 09:45) (19 - 19)  SpO2: 98% (13 Oct 2023 09:45) (95% - 98%)    Parameters below as of 13 Oct 2023 09:45  Patient On (Oxygen Delivery Method): nasal cannula  O2 Flow (L/min): 2      PE:  Constitutional: frail looking  HEENT: NC/AT, EOMI, PERRLA, conjunctivae clear; ears and nose atraumatic; pharynx benign  Neck: supple; thyroid not palpable  Back: no tenderness  Respiratory: respiratory effort normal; clear to auscultation  Cardiovascular: S1S2 regular, no murmurs  Abdomen: soft, not tender, not distended, positive BS; liver and spleen WNL  Genitourinary: no suprapubic tenderness  Lymphatic: no LN palpable  Musculoskeletal: no muscle tenderness, no joint swelling or tenderness  Extremities: no pedal edema  Neurological/ Psychiatric: moving all extremities  Skin: no rashes; no palpable lesions    Labs: all available labs reviewed                        12.5   14.45 )-----------( 337      ( 13 Oct 2023 07:42 )             35.9     10-13    140  |  114<H>  |  23  ----------------------------<  93  3.3<L>   |  18<L>  |  0.56    Ca    8.2<L>      13 Oct 2023 07:42    TPro  5.4<L>  /  Alb  2.2<L>  /  TBili  0.6  /  DBili  x   /  AST  38<H>  /  ALT  34  /  AlkPhos  71  10-12           Urinalysis Basic - ( 11 Oct 2023 08:09 )    Color: x / Appearance: x / SG: x / pH: x  Gluc: 90 mg/dL / Ketone: x  / Bili: x / Urobili: x   Blood: x / Protein: x / Nitrite: x   Leuk Esterase: x / RBC: x / WBC x   Sq Epi: x / Non Sq Epi: x / Bacteria: x    Culture - Blood (10.10.23 @ 11:03)   Specimen Source: .Blood Blood-Peripheral  Culture Results:   No growth at 24 hours  Culture - Blood (10.10.23 @ 11:03)   Specimen Source: .Blood Blood-Peripheral  Culture Results:   No growth at 24 hours    Radiology: all available radiological tests reviewed      ACC: [id number] EXAM:  CT ABDOMEN AND PELVIS IC   ORDERED BY: CALIXTO THOMPSON     ACC: [id number] EXAM:  CT CHEST IC   ORDERED BY: CALIXTO THOMPSON     PROCEDURE DATE:  10/10/2023          INTERPRETATION:  CLINICAL INFORMATION: Hypotension, left-sidedabdominal   pain    COMPARISON: None.    CONTRAST/COMPLICATIONS:  IV Contrast: Omnipaque 350 (accession [id number]), IV contrast documented   in unlinked concurrent exam (accession [id number])  90 cc administered   10   cc discarded  Oral Contrast: NONE  Complications: None reported at time of study completion    PROCEDURE:  CT of the Chest, Abdomen and Pelvis was performed.  Sagittal and coronal reformats were performed.    FINDINGS:  CHEST:  LUNGS AND LARGE AIRWAYS: Patent central airways. No consolidation.  PLEURA: Trace right pleural effusion.  VESSELS: Normal caliber thoracic aorta. Slightly dilated main pulmonary   artery.  HEART: Cardiomegaly with biatrial enlargement. No pericardial effusion.   Coronary artery calcifications.  MEDIASTINUMAND JC: No lymphadenopathy.  CHEST WALL AND LOWER NECK: Left chest wall pacemaker with the right   atrium and right ventricle. Heterogeneous and enlarged right thyroid   lobe. Irregular hypoattenuating lesion posterior to the left thyroid   lobe, possibly representing a thyroid nodule or parathyroid lesion   measuring 1.2 x 0.8 cm (2; 5).    ABDOMEN AND PELVIS:  LIVER: Scattered hypoattenuating liver lesions, incompletely   characterized, likely benign cysts and/or hemangiomas.  BILE DUCTS: Normal caliber.  GALLBLADDER: Within normal limits.  SPLEEN: Within normal limits.  PANCREAS: Within normal limits.  ADRENALS: Within normal limits.  KIDNEYS/URETERS: Within normal limits.    BLADDER: Within normal limits.  REPRODUCTIVE ORGANS: Fibroid uterus.    BOWEL: Distended and fluid-filled ascending colon with pneumatosis,   suspicious for ischemic colitis. Diffusely related to fluid-filled loops   of small bowel extending to the level of ileocecal valve, likely   representing reactive ileus. Appendix is normal.  PERITONEUM: Small volume ascites  VESSELS: Normal caliber abdominal aorta with mild scattered calcified   atherosclerotic plaque. The celiac artery, superior mesenteric artery,   and inferior mesenteric artery are grossly patent. The superior   mesenteric vein, portal vein, and splenic vein are grossly patent.  RETROPERITONEUM/LYMPH NODES: No lymphadenopathy.  ABDOMINAL WALL: Moderate anasarca. Moderate fat-containing umbilical   hernia with ascites fluid within the hernia sac.  BONES: Severe degenerative changes of bilateral hips. Degenerative   changes throughout the thoracolumbar spine.    IMPRESSION:  1.  Fluid-filled ascending colon pneumatosis, suspicious for acute   ischemic colitis.  2.  Diffusely distended small bowel loops likely representing a reactive   ileus. No transition point to suggest a small bowel obstruction.  3.  Small volume ascites, possibly reactive.    Findings of ischemic colitis were discussed with Dr. CALIXTO THOMPSON   10/10/2023 12:59 PM Mauricio Neal with read back confirmation.    --- End of Report ---    < end of copied text >    Advanced directives addressed: full resuscitation

## 2023-10-13 NOTE — PROGRESS NOTE ADULT - SUBJECTIVE AND OBJECTIVE BOX
Reason for Admission: weakness, diarrhea    Patient is a 84 y/o/f with PMHx of dementia, chronic AFIB on coumadin, s/p PPM, HTN, HLD, glaucoma, hypothyroid who presented to  with CC of weakness, diarrhea.  History is obtained from patient's family.  Patient lives at home with her sister-- Kathy.  Kathy was out of town last few days but as per family, patient has been declining.  She wasn't eating as much, having some diarrhea.  She had her INR checked which was elevated last week and her coumadin dosing was adjusted.  Today, her sister tried getting her up OOB to clean her up after having diarrhea and she came weak and had near syncope and they lowered her to the ground.  No fall/ trauma.  They brought her to the Er for further evaluation.  In the ER, leonarda found to have hypotension with inital SBP in 50's.  Elevated lactate 3.1.  Patient given 2.5 L NSS.  CT concerning for ischemic colitis-- right colon.  Surgery was consulted who recommended conservative management for now and close f/u exams.  Patient denies abd pain.  Patient also tested positive for COVID-- sister denies sx other than weakness/ diarrhea.       Subjective/interval events:  - No acute events, VSS, no new complaints per patient   - Started on CLD today  - C diff pcr negative   - UCx with streptococcus, klebsiella - both sensitive to zosyn   - BP now rising off home meds, restarted home coreg + lisinopril     REVIEW OF SYSTEMS:  - unable to obtain due to mental status     Physical Exam:   Vital Signs Last 24 Hrs  T(C): 36.7 (13 Oct 2023 09:45), Max: 37.1 (12 Oct 2023 17:10)  T(F): 98 (13 Oct 2023 09:45), Max: 98.8 (12 Oct 2023 17:10)  HR: 93 (13 Oct 2023 09:45) (90 - 100)  BP: 167/88 (13 Oct 2023 09:45) (140/87 - 167/88)  BP(mean): --  RR: 19 (13 Oct 2023 09:45) (19 - 19)  SpO2: 98% (13 Oct 2023 09:45) (95% - 98%)    Parameters below as of 13 Oct 2023 09:45  Patient On (Oxygen Delivery Method): nasal cannula  O2 Flow (L/min): 2    GENERAL APPEARANCE:  Frail and sick, confused  HEENT:  atraumatic  Skin:  thin /  dry  NECK:  Supple without lymphadenopathy.   HEART:  Regular rate and rhythm. normal S1 and S2, No M/R/G  LUNGS:  no BM sounds  ABDOMEN:  Soft, nontender, nondistended with mild bowel sounds heard, +mod-large umbilical hernia   EXTREMITIES:  Without cyanosis, clubbing or edema.   NEUROLOGICAL:  Gross nonfocal     Labs:     CBC Full  -  ( 12 Oct 2023 08:15 )  WBC Count : 12.26 K/uL  RBC Count : 4.11 M/uL  Hemoglobin : 12.7 g/dL  Hematocrit : 37.7 %  Platelet Count - Automated : 292 K/uL  Mean Cell Volume : 91.7 fl  Mean Cell Hemoglobin : 30.9 pg  Mean Cell Hemoglobin Concentration : 33.7 gm/dL  Auto Neutrophil # : x  Auto Lymphocyte # : x  Auto Monocyte # : x  Auto Eosinophil # : x  Auto Basophil # : x  Auto Neutrophil % : x  Auto Lymphocyte % : x  Auto Monocyte % : x  Auto Eosinophil % : x  Auto Basophil % : x    PT/INR - ( 12 Oct 2023 08:15 )   PT: 23.6 sec;   INR: 2.14 ratio        Urinalysis Basic - ( 12 Oct 2023 08:15 )    Color: x / Appearance: x / SG: x / pH: x  Gluc: 76 mg/dL / Ketone: x  / Bili: x / Urobili: x   Blood: x / Protein: x / Nitrite: x   Leuk Esterase: x / RBC: x / WBC x   Sq Epi: x / Non Sq Epi: x / Bacteria: x      10-12    135  |  111<H>  |  35<H>  ----------------------------<  76  3.5   |  19<L>  |  0.72    Ca    8.5      12 Oct 2023 08:15    TPro  5.4<L>  /  Alb  2.2<L>  /  TBili  0.6  /  DBili  x   /  AST  38<H>  /  ALT  34  /  AlkPhos  71  10-12    RADIOLOGY  < from: CT Abdomen and Pelvis w/ IV Cont (10.10.23 @ 12:13) >  IMPRESSION:  1.  Fluid-filled ascending colon pneumatosis, suspicious for acute   ischemic colitis.  2.  Diffusely distended small bowel loops likely representing a reactive   ileus. No transition point to suggest a small bowel obstruction.  3.  Small volume ascites, possibly reactive.    < end of copied text >    MEDICATIONS  MEDICATIONS  (STANDING):  atorvastatin 40 milliGRAM(s) Oral at bedtime  carvedilol 3.125 milliGRAM(s) Oral every 12 hours  digoxin     Tablet 125 MICROGram(s) Oral every other day  donepezil 10 milliGRAM(s) Oral at bedtime  influenza  Vaccine (HIGH DOSE) 0.7 milliLiter(s) IntraMuscular once  latanoprost 0.005% Ophthalmic Solution 1 Drop(s) Both EYES at bedtime  levothyroxine 50 MICROGram(s) Oral daily  lisinopril 5 milliGRAM(s) Oral daily  memantine 10 milliGRAM(s) Oral two times a day  piperacillin/tazobactam IVPB.. 3.375 Gram(s) IV Intermittent every 8 hours  QUEtiapine 12.5 milliGRAM(s) Oral at bedtime  sodium chloride 0.9% Bolus 1000 milliLiter(s) IV Bolus once  sodium chloride 0.9%. 1000 milliLiter(s) (100 mL/Hr) IV Continuous <Continuous>  warfarin 3 milliGRAM(s) Oral at bedtime    MEDICATIONS  (PRN):  acetaminophen     Tablet .. 650 milliGRAM(s) Oral every 6 hours PRN Temp greater or equal to 38C (100.4F), Mild Pain (1 - 3)  aluminum hydroxide/magnesium hydroxide/simethicone Suspension 30 milliLiter(s) Oral every 4 hours PRN Dyspepsia  melatonin 3 milliGRAM(s) Oral at bedtime PRN Insomnia  ondansetron Injectable 4 milliGRAM(s) IV Push every 8 hours PRN Nausea and/or Vomiting  QUEtiapine 12.5 milliGRAM(s) Oral at bedtime PRN agitation      A/P:  84 y/o female with PMHx of dementia, chronic AFIB on coumadin, s/p PPM, HTN, HLD, glaucoma, hypothyroid who presented to  with CC of weakness, diarrhea and near syncope at home. Patient admitted for ischemic colitis, UTI, septic shock and COVID.        # Septic Shock - Sources of infection UTI / COVID-19 / ischemic colitis.    - s/p tele monitoring  - CT abd/pelvis as above  - c/w IV zosyn  - C diff pcr negative , BCx negative   - UCx with streptococcus, klebsiella - both sensitive to zosyn   - BP now rising off home meds, restarted home coreg + lisinopril   - Suspect shock more related to UTI/ ischemic colitis.    - lactate 3.1 -> 1.5 /  WBC trending down /  Na improved    # Ischemic Colitis:  - Right colon on CT  - Appreciate surgical eval-- no plans for OR for now.    - Start CLD today, ADAT   - Distended on exam but no significant tenderness on exam.    - Serial abd exams.      # Coagulopathy, supratherapeutic INR (POA)  # chronic afib (on coumadin)   - Secondary to coumadin   - INR 13 --> 2.72 today  - coumadin resumed, goal INR 2-3    # SHANA:    - Suspect all prerenal.    - Patient was on lasix at home.    - s/p 2.5 L in ER.    - NSS @ 100.    - Cr 1.78 -> 0.56    #COVID:    Patient with sx of weakness/ diarrhea but could also be explained by colitis/ UTI.    Trend.    No SOB/ cough/ fever.    Supportive care for now.      # Diarrhea:    - C diff negative    - May be related to ischemic colitis/ COVID.      # Dementia:    Cont aricept/ namenda.    CO on floors.    Seroquel PRN.      #HTN:    Hold  Lasix/ Spironolactone with hypotension.    - BP now rising off home meds, restarted home coreg + lisinopril     #Hypothyroid:    - Cont synthroid.      #HLD:    - Cont statin.      #DVT Proph: coumadin     #Advanced directives:  as above.  DNR/ DNI.  No pressors.  No central line.

## 2023-10-13 NOTE — PROGRESS NOTE ADULT - ASSESSMENT
84 y/o female with PMHx of dementia, chronic AFIB on coumadin, s/p PPM, HTN, HLD, glaucoma, hypothyroid who presented to  with CC of weakness, diarrhea.  History is obtained from patient's family.  Patient lives at home with her sister-- Kathy.  Kathy was out of town last few days but as per family, patient has been declining.  She wasn't eating as much, having some diarrhea.  She had her INR checked which was elevated last week and her coumadin dosing was adjusted. Pts sister tried getting her up OOB to clean her up after having diarrhea and she came weak and had near syncope and they lowered her to the ground.  No fall/ trauma.  They brought her to the Er for further evaluation.  In the ER, leonarda found to have hypotension with inital SBP in 50's.  Elevated lactate 3.1.  Patient given 2.5 L NSS.  CT concerning for ischemic colitis-- right colon.  Surgery was consulted who recommended conservative management for now and close f/u exams.  Patient denies abd pain.  Patient also tested positive for COVID-- sister denies sx other than weakness/ diarrhea.  Patient also found to have UTI with postiive UA.  Patient pancultured and started on cipro/ flagyl.  Also given 1 dose zosyn.      1. Sepsis. Ischemic colitis. Pyuria. UTI. Covid-19 Viral Syndrome.   - gi, surgical f/u noted  - palliative care following  - on IV zosyn [id number] #3-4; continue with abx coverage   - f/u urine cx - enterococcus S zosyn blood cx no growth  - not hypoxic does not require remdesivir, decadron at this time  - isolation precautions  - fu cbc  - monitor temps  - tolerating abx well so far; no side effects noted  - reason for abx use and side effects reviewed with patient  - supportive care    2. other issues - care per medicine

## 2023-10-13 NOTE — PHYSICAL THERAPY INITIAL EVALUATION ADULT - PERTINENT HX OF CURRENT PROBLEM, REHAB EVAL
Patient is a 82 y/o/f with PMHx of dementia, chronic AFIB on coumadin, s/p PPM, HTN, HLD, glaucoma, hypothyroid who presented to  with CC of weakness, diarrhea. Patient lives at home with her sister-- Kathy. Patient admitted for ischemic colitis, UTI, septic shock and COVID.  No plans for OR for now

## 2023-10-13 NOTE — PHYSICAL THERAPY INITIAL EVALUATION ADULT - PRECAUTIONS/LIMITATIONS, REHAB EVAL
Covid +, contact precaution C-Diff?/fall precautions/isolation precautions Covid + contact precaution C-Diff?/fall precautions/isolation precautions

## 2023-10-14 LAB
ALBUMIN SERPL ELPH-MCNC: 2 G/DL — LOW (ref 3.3–5)
ALP SERPL-CCNC: 69 U/L — SIGNIFICANT CHANGE UP (ref 40–120)
ALT FLD-CCNC: 28 U/L — SIGNIFICANT CHANGE UP (ref 12–78)
ANION GAP SERPL CALC-SCNC: 7 MMOL/L — SIGNIFICANT CHANGE UP (ref 5–17)
AST SERPL-CCNC: 23 U/L — SIGNIFICANT CHANGE UP (ref 15–37)
BILIRUB SERPL-MCNC: 0.4 MG/DL — SIGNIFICANT CHANGE UP (ref 0.2–1.2)
BUN SERPL-MCNC: 20 MG/DL — SIGNIFICANT CHANGE UP (ref 7–23)
CALCIUM SERPL-MCNC: 8 MG/DL — LOW (ref 8.5–10.1)
CHLORIDE SERPL-SCNC: 113 MMOL/L — HIGH (ref 96–108)
CO2 SERPL-SCNC: 18 MMOL/L — LOW (ref 22–31)
CREAT SERPL-MCNC: 0.65 MG/DL — SIGNIFICANT CHANGE UP (ref 0.5–1.3)
EGFR: 87 ML/MIN/1.73M2 — SIGNIFICANT CHANGE UP
GLUCOSE SERPL-MCNC: 167 MG/DL — HIGH (ref 70–99)
HCT VFR BLD CALC: 39.3 % — SIGNIFICANT CHANGE UP (ref 34.5–45)
HGB BLD-MCNC: 13.2 G/DL — SIGNIFICANT CHANGE UP (ref 11.5–15.5)
INR BLD: 5.05 RATIO — CRITICAL HIGH (ref 0.85–1.18)
MAGNESIUM SERPL-MCNC: 2.2 MG/DL — SIGNIFICANT CHANGE UP (ref 1.6–2.6)
MCHC RBC-ENTMCNC: 31.4 PG — SIGNIFICANT CHANGE UP (ref 27–34)
MCHC RBC-ENTMCNC: 33.6 GM/DL — SIGNIFICANT CHANGE UP (ref 32–36)
MCV RBC AUTO: 93.3 FL — SIGNIFICANT CHANGE UP (ref 80–100)
PHOSPHATE SERPL-MCNC: 1.8 MG/DL — LOW (ref 2.5–4.5)
PLATELET # BLD AUTO: 298 K/UL — SIGNIFICANT CHANGE UP (ref 150–400)
POTASSIUM SERPL-MCNC: 3.6 MMOL/L — SIGNIFICANT CHANGE UP (ref 3.5–5.3)
POTASSIUM SERPL-SCNC: 3.6 MMOL/L — SIGNIFICANT CHANGE UP (ref 3.5–5.3)
PROT SERPL-MCNC: 4.8 GM/DL — LOW (ref 6–8.3)
PROTHROM AB SERPL-ACNC: 54.4 SEC — HIGH (ref 9.5–13)
RBC # BLD: 4.21 M/UL — SIGNIFICANT CHANGE UP (ref 3.8–5.2)
RBC # FLD: 13.8 % — SIGNIFICANT CHANGE UP (ref 10.3–14.5)
SODIUM SERPL-SCNC: 138 MMOL/L — SIGNIFICANT CHANGE UP (ref 135–145)
WBC # BLD: 15.2 K/UL — HIGH (ref 3.8–10.5)
WBC # FLD AUTO: 15.2 K/UL — HIGH (ref 3.8–10.5)

## 2023-10-14 PROCEDURE — 99232 SBSQ HOSP IP/OBS MODERATE 35: CPT

## 2023-10-14 RX ORDER — SODIUM,POTASSIUM PHOSPHATES 278-250MG
1 POWDER IN PACKET (EA) ORAL ONCE
Refills: 0 | Status: COMPLETED | OUTPATIENT
Start: 2023-10-14 | End: 2023-10-14

## 2023-10-14 RX ADMIN — LATANOPROST 1 DROP(S): 0.05 SOLUTION/ DROPS OPHTHALMIC; TOPICAL at 22:08

## 2023-10-14 RX ADMIN — Medication 1 PACKET(S): at 10:59

## 2023-10-14 RX ADMIN — ATORVASTATIN CALCIUM 40 MILLIGRAM(S): 80 TABLET, FILM COATED ORAL at 22:09

## 2023-10-14 RX ADMIN — PIPERACILLIN AND TAZOBACTAM 25 GRAM(S): 4; .5 INJECTION, POWDER, LYOPHILIZED, FOR SOLUTION INTRAVENOUS at 05:56

## 2023-10-14 RX ADMIN — Medication 50 MICROGRAM(S): at 05:59

## 2023-10-14 RX ADMIN — Medication 650 MILLIGRAM(S): at 18:02

## 2023-10-14 RX ADMIN — Medication 3 MILLIGRAM(S): at 22:10

## 2023-10-14 RX ADMIN — PIPERACILLIN AND TAZOBACTAM 25 GRAM(S): 4; .5 INJECTION, POWDER, LYOPHILIZED, FOR SOLUTION INTRAVENOUS at 22:08

## 2023-10-14 RX ADMIN — DONEPEZIL HYDROCHLORIDE 10 MILLIGRAM(S): 10 TABLET, FILM COATED ORAL at 22:09

## 2023-10-14 RX ADMIN — MEMANTINE HYDROCHLORIDE 10 MILLIGRAM(S): 10 TABLET ORAL at 10:59

## 2023-10-14 RX ADMIN — LISINOPRIL 5 MILLIGRAM(S): 2.5 TABLET ORAL at 10:59

## 2023-10-14 RX ADMIN — CARVEDILOL PHOSPHATE 3.12 MILLIGRAM(S): 80 CAPSULE, EXTENDED RELEASE ORAL at 10:59

## 2023-10-14 RX ADMIN — PIPERACILLIN AND TAZOBACTAM 25 GRAM(S): 4; .5 INJECTION, POWDER, LYOPHILIZED, FOR SOLUTION INTRAVENOUS at 13:24

## 2023-10-14 RX ADMIN — MEMANTINE HYDROCHLORIDE 10 MILLIGRAM(S): 10 TABLET ORAL at 22:10

## 2023-10-14 RX ADMIN — CARVEDILOL PHOSPHATE 3.12 MILLIGRAM(S): 80 CAPSULE, EXTENDED RELEASE ORAL at 22:09

## 2023-10-14 RX ADMIN — QUETIAPINE FUMARATE 12.5 MILLIGRAM(S): 200 TABLET, FILM COATED ORAL at 22:09

## 2023-10-14 NOTE — PROGRESS NOTE ADULT - ASSESSMENT
82 y/o female with PMHx of dementia, chronic AFIB on coumadin, s/p PPM, HTN, HLD, glaucoma, hypothyroid who presented to  with CC of weakness, diarrhea and near syncope at home. Patient admitted for ischemic colitis, UTI, septic shock and COVID.        # Septic Shock - Sources of infection UTI / COVID-19 / ischemic colitis.    - s/p tele monitoring  - CT abd/pelvis as above  - c/w IV zosyn  - C diff pcr negative , BCx negative   - UCx with streptococcus, klebsiella - both sensitive to zosyn   - BP now rising off home meds, restarted home coreg + lisinopril   - Suspect shock more related to UTI/ ischemic colitis.    - lactate 3.1 -> 1.5 /  WBC trending down /  Na improved    # Ischemic Colitis:  - Right colon on CT  - Appreciate surgical eval-- no plans for OR for now.    - Start CLD yesterday, ADAT   - Distended on exam but no significant tenderness on exam.    - Serial abd exams.      # Coagulopathy, supratherapeutic INR (POA)  # chronic afib (on coumadin)   - Secondary to coumadin   - INR 13 --> 2.72 today  - coumadin resumed, goal INR 2-3    # SHANA:    - Suspect all prerenal.    - Patient was on lasix at home.    - s/p 2.5 L in ER.    - NSS @ 100.    - Cr 1.78 -> 0.56    #COVID:    Patient with sx of weakness/ diarrhea but could also be explained by colitis/ UTI.    Trend.    No SOB/ cough/ fever.    Supportive care for now.      # Diarrhea:    - C diff negative    - May be related to ischemic colitis/ COVID.      # Dementia:    Cont aricept/ namenda.    CO on floors.    Seroquel PRN.      #HTN:    Hold  Lasix/ Spironolactone with hypotension.    - BP now rising off home meds, restarted home coreg + lisinopril     #Hypothyroid:    - Cont synthroid.      #HLD:    - Cont statin.      #DVT Proph: coumadin     #Advanced directives:  as above.  DNR/ DNI.  No pressors.  No central line.

## 2023-10-14 NOTE — PROGRESS NOTE ADULT - SUBJECTIVE AND OBJECTIVE BOX
HOSPITALIST ATTENDING PROGRESS NOTE    Chart and meds reviewed.      Subjective: Patient seen and examined.     Patient is a 82 y/o/f with PMHx of dementia, chronic AFIB on coumadin, s/p PPM, HTN, HLD, glaucoma, hypothyroid who presented to  with CC of weakness, diarrhea.  History is obtained from patient's family.  Patient lives at home with her sister-- Kathy.  Kathy was out of town last few days but as per family, patient has been declining.  She wasn't eating as much, having some diarrhea.  She had her INR checked which was elevated last week and her coumadin dosing was adjusted.  Today, her sister tried getting her up OOB to clean her up after having diarrhea and she came weak and had near syncope and they lowered her to the ground.  No fall/ trauma.  They brought her to the Er for further evaluation.  In the ER, leonarda found to have hypotension with inital SBP in 50's.  Elevated lactate 3.1.  Patient given 2.5 L NSS.  CT concerning for ischemic colitis-- right colon.  Surgery was consulted who recommended conservative management for now and close f/u exams.  Patient denies abd pain.  Patient also tested positive for COVID-- sister denies sx other than weakness/ diarrhea.       Subjective/interval events:  - No acute events, VSS, no new complaints per patient   - Started on CLD yesterday  - C diff pcr negative   - UCx with streptococcus, klebsiella - both sensitive to zosyn   - BP now rising off home meds, restarted home coreg + lisinopril       Additional results/Imaging, I have personally reviewed:    LABS:                            13.2   15.20 )-----------( 298      ( 14 Oct 2023 07:27 )             39.3     10-14    138  |  113<H>  |  20  ----------------------------<  167<H>  3.6   |  18<L>  |  0.65    Ca    8.0<L>      14 Oct 2023 07:27  Phos  1.8     10-14  Mg     2.2     10-14    TPro  4.8<L>  /  Alb  2.0<L>  /  TBili  0.4  /  DBili  x   /  AST  23  /  ALT  28  /  AlkPhos  69  10-14        LIVER FUNCTIONS - ( 14 Oct 2023 07:27 )  Alb: 2.0 g/dL / Pro: 4.8 gm/dL / ALK PHOS: 69 U/L / ALT: 28 U/L / AST: 23 U/L / GGT: x           PT/INR - ( 14 Oct 2023 07:27 )   PT: 54.4 sec;   INR: 5.05 ratio         PTT - ( 13 Oct 2023 07:42 )  PTT:32.3 sec  Urinalysis Basic - ( 14 Oct 2023 07:27 )    Color: x / Appearance: x / SG: x / pH: x  Gluc: 167 mg/dL / Ketone: x  / Bili: x / Urobili: x   Blood: x / Protein: x / Nitrite: x   Leuk Esterase: x / RBC: x / WBC x   Sq Epi: x / Non Sq Epi: x / Bacteria: x              All other systems reviewed and found to be negative with the exception of what has been described above.    MEDICATIONS  (STANDING):  atorvastatin 40 milliGRAM(s) Oral at bedtime  carvedilol 3.125 milliGRAM(s) Oral every 12 hours  digoxin     Tablet 125 MICROGram(s) Oral every other day  donepezil 10 milliGRAM(s) Oral at bedtime  influenza  Vaccine (HIGH DOSE) 0.7 milliLiter(s) IntraMuscular once  latanoprost 0.005% Ophthalmic Solution 1 Drop(s) Both EYES at bedtime  levothyroxine 50 MICROGram(s) Oral daily  lisinopril 5 milliGRAM(s) Oral daily  memantine 10 milliGRAM(s) Oral two times a day  piperacillin/tazobactam IVPB.. 3.375 Gram(s) IV Intermittent every 8 hours  QUEtiapine 12.5 milliGRAM(s) Oral at bedtime  sodium chloride 0.9% Bolus 1000 milliLiter(s) IV Bolus once  sodium chloride 0.9%. 1000 milliLiter(s) (100 mL/Hr) IV Continuous <Continuous>    MEDICATIONS  (PRN):  acetaminophen     Tablet .. 650 milliGRAM(s) Oral every 6 hours PRN Temp greater or equal to 38C (100.4F), Mild Pain (1 - 3)  aluminum hydroxide/magnesium hydroxide/simethicone Suspension 30 milliLiter(s) Oral every 4 hours PRN Dyspepsia  melatonin 3 milliGRAM(s) Oral at bedtime PRN Insomnia  ondansetron Injectable 4 milliGRAM(s) IV Push every 8 hours PRN Nausea and/or Vomiting  QUEtiapine 12.5 milliGRAM(s) Oral at bedtime PRN agitation      VITALS:  T(F): 97.6 (10-14-23 @ 09:02), Max: 98.3 (10-13-23 @ 21:20)  HR: 93 (10-14-23 @ 11:49) (84 - 99)  BP: 130/69 (10-14-23 @ 11:49) (120/83 - 146/108)  RR: 18 (10-14-23 @ 11:49) (18 - 18)  SpO2: 100% (10-14-23 @ 11:49) (93% - 100%)  Wt(kg): --    I&O's Summary      CAPILLARY BLOOD GLUCOSE          PHYSICAL EXAM:  GENERAL APPEARANCE:  Frail and sick, confused  HEENT:  atraumatic  Skin:  thin /  dry  NECK:  Supple without lymphadenopathy.   HEART:  Regular rate and rhythm. normal S1 and S2, No M/R/G  LUNGS:  no BM sounds  ABDOMEN:  Soft, nontender, nondistended with mild bowel sounds heard, +mod-large umbilical hernia   EXTREMITIES:  Without cyanosis, clubbing or edema.   NEUROLOGICAL:  Gross nonfocal     CULTURES:      Telemetry, personally reviewed

## 2023-10-15 LAB
ALBUMIN SERPL ELPH-MCNC: 2.2 G/DL — LOW (ref 3.3–5)
ALP SERPL-CCNC: 64 U/L — SIGNIFICANT CHANGE UP (ref 40–120)
ALT FLD-CCNC: 27 U/L — SIGNIFICANT CHANGE UP (ref 12–78)
ANION GAP SERPL CALC-SCNC: 4 MMOL/L — LOW (ref 5–17)
AST SERPL-CCNC: 22 U/L — SIGNIFICANT CHANGE UP (ref 15–37)
BILIRUB SERPL-MCNC: 0.4 MG/DL — SIGNIFICANT CHANGE UP (ref 0.2–1.2)
BUN SERPL-MCNC: 16 MG/DL — SIGNIFICANT CHANGE UP (ref 7–23)
CALCIUM SERPL-MCNC: 8.3 MG/DL — LOW (ref 8.5–10.1)
CHLORIDE SERPL-SCNC: 110 MMOL/L — HIGH (ref 96–108)
CO2 SERPL-SCNC: 23 MMOL/L — SIGNIFICANT CHANGE UP (ref 22–31)
CREAT SERPL-MCNC: 0.66 MG/DL — SIGNIFICANT CHANGE UP (ref 0.5–1.3)
CULTURE RESULTS: SIGNIFICANT CHANGE UP
CULTURE RESULTS: SIGNIFICANT CHANGE UP
EGFR: 87 ML/MIN/1.73M2 — SIGNIFICANT CHANGE UP
GLUCOSE SERPL-MCNC: 163 MG/DL — HIGH (ref 70–99)
HCT VFR BLD CALC: 37.9 % — SIGNIFICANT CHANGE UP (ref 34.5–45)
HGB BLD-MCNC: 12.7 G/DL — SIGNIFICANT CHANGE UP (ref 11.5–15.5)
INR BLD: 11.75 RATIO — CRITICAL HIGH (ref 0.85–1.18)
MCHC RBC-ENTMCNC: 31 PG — SIGNIFICANT CHANGE UP (ref 27–34)
MCHC RBC-ENTMCNC: 33.5 GM/DL — SIGNIFICANT CHANGE UP (ref 32–36)
MCV RBC AUTO: 92.4 FL — SIGNIFICANT CHANGE UP (ref 80–100)
PLATELET # BLD AUTO: 345 K/UL — SIGNIFICANT CHANGE UP (ref 150–400)
POTASSIUM SERPL-MCNC: 3.8 MMOL/L — SIGNIFICANT CHANGE UP (ref 3.5–5.3)
POTASSIUM SERPL-SCNC: 3.8 MMOL/L — SIGNIFICANT CHANGE UP (ref 3.5–5.3)
PROT SERPL-MCNC: 4.9 GM/DL — LOW (ref 6–8.3)
PROTHROM AB SERPL-ACNC: 123.6 SEC — HIGH (ref 9.5–13)
RBC # BLD: 4.1 M/UL — SIGNIFICANT CHANGE UP (ref 3.8–5.2)
RBC # FLD: 14 % — SIGNIFICANT CHANGE UP (ref 10.3–14.5)
SARS-COV-2 RNA SPEC QL NAA+PROBE: SIGNIFICANT CHANGE UP
SODIUM SERPL-SCNC: 137 MMOL/L — SIGNIFICANT CHANGE UP (ref 135–145)
SPECIMEN SOURCE: SIGNIFICANT CHANGE UP
SPECIMEN SOURCE: SIGNIFICANT CHANGE UP
WBC # BLD: 19.2 K/UL — HIGH (ref 3.8–10.5)
WBC # FLD AUTO: 19.2 K/UL — HIGH (ref 3.8–10.5)

## 2023-10-15 PROCEDURE — 99232 SBSQ HOSP IP/OBS MODERATE 35: CPT

## 2023-10-15 RX ORDER — PHYTONADIONE (VIT K1) 5 MG
5 TABLET ORAL ONCE
Refills: 0 | Status: COMPLETED | OUTPATIENT
Start: 2023-10-15 | End: 2023-10-15

## 2023-10-15 RX ADMIN — ATORVASTATIN CALCIUM 40 MILLIGRAM(S): 80 TABLET, FILM COATED ORAL at 22:49

## 2023-10-15 RX ADMIN — Medication 50 MICROGRAM(S): at 06:08

## 2023-10-15 RX ADMIN — Medication 125 MICROGRAM(S): at 11:02

## 2023-10-15 RX ADMIN — CARVEDILOL PHOSPHATE 3.12 MILLIGRAM(S): 80 CAPSULE, EXTENDED RELEASE ORAL at 11:02

## 2023-10-15 RX ADMIN — LISINOPRIL 5 MILLIGRAM(S): 2.5 TABLET ORAL at 11:05

## 2023-10-15 RX ADMIN — SODIUM CHLORIDE 100 MILLILITER(S): 9 INJECTION INTRAMUSCULAR; INTRAVENOUS; SUBCUTANEOUS at 12:25

## 2023-10-15 RX ADMIN — QUETIAPINE FUMARATE 12.5 MILLIGRAM(S): 200 TABLET, FILM COATED ORAL at 22:50

## 2023-10-15 RX ADMIN — DONEPEZIL HYDROCHLORIDE 10 MILLIGRAM(S): 10 TABLET, FILM COATED ORAL at 22:49

## 2023-10-15 RX ADMIN — PIPERACILLIN AND TAZOBACTAM 25 GRAM(S): 4; .5 INJECTION, POWDER, LYOPHILIZED, FOR SOLUTION INTRAVENOUS at 13:12

## 2023-10-15 RX ADMIN — Medication 101 MILLIGRAM(S): at 10:58

## 2023-10-15 RX ADMIN — CARVEDILOL PHOSPHATE 3.12 MILLIGRAM(S): 80 CAPSULE, EXTENDED RELEASE ORAL at 22:49

## 2023-10-15 RX ADMIN — MEMANTINE HYDROCHLORIDE 10 MILLIGRAM(S): 10 TABLET ORAL at 11:02

## 2023-10-15 RX ADMIN — LATANOPROST 1 DROP(S): 0.05 SOLUTION/ DROPS OPHTHALMIC; TOPICAL at 22:51

## 2023-10-15 RX ADMIN — PIPERACILLIN AND TAZOBACTAM 25 GRAM(S): 4; .5 INJECTION, POWDER, LYOPHILIZED, FOR SOLUTION INTRAVENOUS at 22:50

## 2023-10-15 RX ADMIN — PIPERACILLIN AND TAZOBACTAM 25 GRAM(S): 4; .5 INJECTION, POWDER, LYOPHILIZED, FOR SOLUTION INTRAVENOUS at 06:08

## 2023-10-15 RX ADMIN — MEMANTINE HYDROCHLORIDE 10 MILLIGRAM(S): 10 TABLET ORAL at 22:49

## 2023-10-15 NOTE — PROGRESS NOTE ADULT - ASSESSMENT
84 y/o female with PMHx of dementia, chronic AFIB on coumadin, s/p PPM, HTN, HLD, glaucoma, hypothyroid who presented to  with CC of weakness, diarrhea and near syncope at home. Patient admitted for ischemic colitis, UTI, septic shock and COVID.        # Septic Shock - Sources of infection UTI / COVID-19 / ischemic colitis.    - s/p tele monitoring  - CT abd/pelvis as above  - c/w IV zosyn  - C diff pcr negative , BCx negative   - UCx with streptococcus, klebsiella - both sensitive to zosyn   - BP now rising off home meds, restarted home coreg + lisinopril   - Suspect shock more related to UTI/ ischemic colitis.    - lactate 3.1 -> 1.5 /  WBC trending down /  Na improved    # Ischemic Colitis:  - Right colon on CT  - Appreciate surgical eval-- no plans for OR for now.    - Start CLD yesterday, ADAT   - Distended on exam but no significant tenderness on exam.    - Serial abd exams.      # Coagulopathy, supratherapeutic INR (POA)  # chronic afib (on coumadin)   - Secondary to coumadin   - INR 13 --> 2.72 today  - coumadin resumed, goal INR 2-3    # SHANA:    - Suspect all prerenal.    - Patient was on lasix at home.    - s/p 2.5 L in ER.    - NSS @ 100.    - Cr 1.78 -> 0.56    #COVID:    Patient with sx of weakness/ diarrhea but could also be explained by colitis/ UTI.    Trend.    No SOB/ cough/ fever.    Supportive care for now.    Repeat Covid test     # Diarrhea:    - C diff negative    - May be related to ischemic colitis/ COVID.      # Dementia:    Cont aricept/ namenda.    CO on floors.    Seroquel PRN.      #HTN:    Hold  Lasix/ Spironolactone with hypotension.    - BP now rising off home meds, restarted home coreg + lisinopril     #Hypothyroid:    - Cont synthroid.      #HLD:    - Cont statin.      #DVT Proph: coumadin     #Advanced directives:  as above.  DNR/ DNI.  No pressors.  No central line.

## 2023-10-15 NOTE — PROGRESS NOTE ADULT - SUBJECTIVE AND OBJECTIVE BOX
HOSPITALIST ATTENDING PROGRESS NOTE    Chart and meds reviewed.      Subjective: Patient seen and examined. patient seen and examined. Abdomen appears softer, no tenderness to palpation. Discussed with daughter, will repeat covid test and order  OT.       Additional results/Imaging, I have personally reviewed:    LABS:                            12.7   19.20 )-----------( 345      ( 15 Oct 2023 07:33 )             37.9     10-15    137  |  110<H>  |  16  ----------------------------<  163<H>  3.8   |  23  |  0.66    Ca    8.3<L>      15 Oct 2023 07:33  Phos  1.8     10-14  Mg     2.2     10-14    TPro  4.9<L>  /  Alb  2.2<L>  /  TBili  0.4  /  DBili  x   /  AST  22  /  ALT  27  /  AlkPhos  64  10-15        LIVER FUNCTIONS - ( 15 Oct 2023 07:33 )  Alb: 2.2 g/dL / Pro: 4.9 gm/dL / ALK PHOS: 64 U/L / ALT: 27 U/L / AST: 22 U/L / GGT: x           PT/INR - ( 15 Oct 2023 07:33 )   PT: 123.6 sec;   INR: 11.75 ratio           Urinalysis Basic - ( 15 Oct 2023 07:33 )    Color: x / Appearance: x / SG: x / pH: x  Gluc: 163 mg/dL / Ketone: x  / Bili: x / Urobili: x   Blood: x / Protein: x / Nitrite: x   Leuk Esterase: x / RBC: x / WBC x   Sq Epi: x / Non Sq Epi: x / Bacteria: x              All other systems reviewed and found to be negative with the exception of what has been described above.    MEDICATIONS  (STANDING):  atorvastatin 40 milliGRAM(s) Oral at bedtime  carvedilol 3.125 milliGRAM(s) Oral every 12 hours  digoxin     Tablet 125 MICROGram(s) Oral every other day  donepezil 10 milliGRAM(s) Oral at bedtime  influenza  Vaccine (HIGH DOSE) 0.7 milliLiter(s) IntraMuscular once  latanoprost 0.005% Ophthalmic Solution 1 Drop(s) Both EYES at bedtime  levothyroxine 50 MICROGram(s) Oral daily  lisinopril 5 milliGRAM(s) Oral daily  memantine 10 milliGRAM(s) Oral two times a day  piperacillin/tazobactam IVPB.. 3.375 Gram(s) IV Intermittent every 8 hours  QUEtiapine 12.5 milliGRAM(s) Oral at bedtime  sodium chloride 0.9% Bolus 1000 milliLiter(s) IV Bolus once  sodium chloride 0.9%. 1000 milliLiter(s) (100 mL/Hr) IV Continuous <Continuous>    MEDICATIONS  (PRN):  acetaminophen     Tablet .. 650 milliGRAM(s) Oral every 6 hours PRN Temp greater or equal to 38C (100.4F), Mild Pain (1 - 3)  aluminum hydroxide/magnesium hydroxide/simethicone Suspension 30 milliLiter(s) Oral every 4 hours PRN Dyspepsia  melatonin 3 milliGRAM(s) Oral at bedtime PRN Insomnia  ondansetron Injectable 4 milliGRAM(s) IV Push every 8 hours PRN Nausea and/or Vomiting  QUEtiapine 12.5 milliGRAM(s) Oral at bedtime PRN agitation      VITALS:  T(F): 97.8 (10-15-23 @ 08:30), Max: 98.4 (10-14-23 @ 21:30)  HR: 88 (10-15-23 @ 11:20) (81 - 88)  BP: 165/78 (10-15-23 @ 11:20) (139/87 - 165/78)  RR: 18 (10-15-23 @ 11:20) (18 - 18)  SpO2: 93% (10-15-23 @ 11:20) (92% - 94%)  Wt(kg): --    I&O's Summary      CAPILLARY BLOOD GLUCOSE          PHYSICAL EXAM:  GENERAL APPEARANCE:  Frail and sick, confused  HEENT:  atraumatic  Skin:  thin /  dry  NECK:  Supple without lymphadenopathy.   HEART:  Regular rate and rhythm. normal S1 and S2, No M/R/G  LUNGS:  no BM sounds  ABDOMEN:  Soft, nontender, nondistended with mild bowel sounds heard, +mod-large umbilical hernia   EXTREMITIES:  Without cyanosis, clubbing or edema.   NEUROLOGICAL:  Gross nonfocal     CULTURES:      Telemetry, personally reviewed

## 2023-10-15 NOTE — PROGRESS NOTE ADULT - SUBJECTIVE AND OBJECTIVE BOX
Date of service: 10-15-23 @ 14:27    pt seen and examined  laying in bed, nad  no o/n events   no fevers    ROS: unable to obtain d/t medical condition      MEDICATIONS  (STANDING):  atorvastatin 40 milliGRAM(s) Oral at bedtime  carvedilol 3.125 milliGRAM(s) Oral every 12 hours  digoxin     Tablet 125 MICROGram(s) Oral every other day  donepezil 10 milliGRAM(s) Oral at bedtime  influenza  Vaccine (HIGH DOSE) 0.7 milliLiter(s) IntraMuscular once  latanoprost 0.005% Ophthalmic Solution 1 Drop(s) Both EYES at bedtime  levothyroxine 50 MICROGram(s) Oral daily  lisinopril 5 milliGRAM(s) Oral daily  memantine 10 milliGRAM(s) Oral two times a day  piperacillin/tazobactam IVPB.. 3.375 Gram(s) IV Intermittent every 8 hours  QUEtiapine 12.5 milliGRAM(s) Oral at bedtime  sodium chloride 0.9% Bolus 1000 milliLiter(s) IV Bolus once  sodium chloride 0.9%. 1000 milliLiter(s) (100 mL/Hr) IV Continuous <Continuous>    Vital Signs Last 24 Hrs  T(C): 36.6 (15 Oct 2023 08:30), Max: 36.9 (14 Oct 2023 21:30)  T(F): 97.8 (15 Oct 2023 08:30), Max: 98.4 (14 Oct 2023 21:30)  HR: 88 (15 Oct 2023 11:20) (81 - 88)  BP: 165/78 (15 Oct 2023 11:20) (139/87 - 165/78)  BP(mean): --  RR: 18 (15 Oct 2023 11:20) (18 - 18)  SpO2: 93% (15 Oct 2023 11:20) (92% - 94%)    Parameters below as of 15 Oct 2023 11:20  Patient On (Oxygen Delivery Method): room air    PE:  Constitutional: frail looking  HEENT: NC/AT, EOMI, PERRLA, conjunctivae clear; ears and nose atraumatic; pharynx benign  Neck: supple; thyroid not palpable  Back: no tenderness  Respiratory: respiratory effort normal; clear to auscultation  Cardiovascular: S1S2 regular, no murmurs  Abdomen: soft, not tender, not distended, positive BS; liver and spleen WNL  Genitourinary: no suprapubic tenderness  Lymphatic: no LN palpable  Musculoskeletal: no muscle tenderness, no joint swelling or tenderness  Extremities: no pedal edema  Neurological/ Psychiatric: moving all extremities  Skin: no rashes; no palpable lesions    Labs: all available labs reviewed                                   12.7   19.20 )-----------( 345      ( 15 Oct 2023 07:33 )             37.9     10-15    137  |  110<H>  |  16  ----------------------------<  163<H>  3.8   |  23  |  0.66    Ca    8.3<L>      15 Oct 2023 07:33  Phos  1.8     10-14  Mg     2.2     10-14    TPro  4.9<L>  /  Alb  2.2<L>  /  TBili  0.4  /  DBili  x   /  AST  22  /  ALT  27  /  AlkPhos  64  10-15      Urinalysis Basic - ( 11 Oct 2023 08:09 )    Color: x / Appearance: x / SG: x / pH: x  Gluc: 90 mg/dL / Ketone: x  / Bili: x / Urobili: x   Blood: x / Protein: x / Nitrite: x   Leuk Esterase: x / RBC: x / WBC x   Sq Epi: x / Non Sq Epi: x / Bacteria: x    Culture - Blood (10.10.23 @ 11:03)   Specimen Source: .Blood Blood-Peripheral  Culture Results:   No growth at 24 hours  Culture - Blood (10.10.23 @ 11:03)   Specimen Source: .Blood Blood-Peripheral  Culture Results:   No growth at 24 hours    Radiology: all available radiological tests reviewed      ACC: [id number] EXAM:  CT ABDOMEN AND PELVIS IC   ORDERED BY: CALIXTO THOMPSON     ACC: [id number] EXAM:  CT CHEST IC   ORDERED BY: CALIXTO THOMPSON     PROCEDURE DATE:  10/10/2023          INTERPRETATION:  CLINICAL INFORMATION: Hypotension, left-sidedabdominal   pain    COMPARISON: None.    CONTRAST/COMPLICATIONS:  IV Contrast: Omnipaque 350 (accession [id number]), IV contrast documented   in unlinked concurrent exam (accession [id number])  90 cc administered   10   cc discarded  Oral Contrast: NONE  Complications: None reported at time of study completion    PROCEDURE:  CT of the Chest, Abdomen and Pelvis was performed.  Sagittal and coronal reformats were performed.    FINDINGS:  CHEST:  LUNGS AND LARGE AIRWAYS: Patent central airways. No consolidation.  PLEURA: Trace right pleural effusion.  VESSELS: Normal caliber thoracic aorta. Slightly dilated main pulmonary   artery.  HEART: Cardiomegaly with biatrial enlargement. No pericardial effusion.   Coronary artery calcifications.  MEDIASTINUMAND JC: No lymphadenopathy.  CHEST WALL AND LOWER NECK: Left chest wall pacemaker with the right   atrium and right ventricle. Heterogeneous and enlarged right thyroid   lobe. Irregular hypoattenuating lesion posterior to the left thyroid   lobe, possibly representing a thyroid nodule or parathyroid lesion   measuring 1.2 x 0.8 cm (2; 5).    ABDOMEN AND PELVIS:  LIVER: Scattered hypoattenuating liver lesions, incompletely   characterized, likely benign cysts and/or hemangiomas.  BILE DUCTS: Normal caliber.  GALLBLADDER: Within normal limits.  SPLEEN: Within normal limits.  PANCREAS: Within normal limits.  ADRENALS: Within normal limits.  KIDNEYS/URETERS: Within normal limits.    BLADDER: Within normal limits.  REPRODUCTIVE ORGANS: Fibroid uterus.    BOWEL: Distended and fluid-filled ascending colon with pneumatosis,   suspicious for ischemic colitis. Diffusely related to fluid-filled loops   of small bowel extending to the level of ileocecal valve, likely   representing reactive ileus. Appendix is normal.  PERITONEUM: Small volume ascites  VESSELS: Normal caliber abdominal aorta with mild scattered calcified   atherosclerotic plaque. The celiac artery, superior mesenteric artery,   and inferior mesenteric artery are grossly patent. The superior   mesenteric vein, portal vein, and splenic vein are grossly patent.  RETROPERITONEUM/LYMPH NODES: No lymphadenopathy.  ABDOMINAL WALL: Moderate anasarca. Moderate fat-containing umbilical   hernia with ascites fluid within the hernia sac.  BONES: Severe degenerative changes of bilateral hips. Degenerative   changes throughout the thoracolumbar spine.    IMPRESSION:  1.  Fluid-filled ascending colon pneumatosis, suspicious for acute   ischemic colitis.  2.  Diffusely distended small bowel loops likely representing a reactive   ileus. No transition point to suggest a small bowel obstruction.  3.  Small volume ascites, possibly reactive.    Findings of ischemic colitis were discussed with Dr. CALIXTO THOMPSON   10/10/2023 12:59 PM Mauricio Neal with read back confirmation.    --- End of Report ---    < end of copied text >    Advanced directives addressed: full resuscitation

## 2023-10-15 NOTE — PROGRESS NOTE ADULT - ASSESSMENT
84 y/o female with PMHx of dementia, chronic AFIB on coumadin, s/p PPM, HTN, HLD, glaucoma, hypothyroid who presented to  with CC of weakness, diarrhea.  History is obtained from patient's family.  Patient lives at home with her sister-- Kathy.  Kathy was out of town last few days but as per family, patient has been declining.  She wasn't eating as much, having some diarrhea.  She had her INR checked which was elevated last week and her coumadin dosing was adjusted. Pts sister tried getting her up OOB to clean her up after having diarrhea and she came weak and had near syncope and they lowered her to the ground.  No fall/ trauma.  They brought her to the Er for further evaluation.  In the ER, leonarda found to have hypotension with inital SBP in 50's.  Elevated lactate 3.1.  Patient given 2.5 L NSS.  CT concerning for ischemic colitis-- right colon.  Surgery was consulted who recommended conservative management for now and close f/u exams.  Patient denies abd pain.  Patient also tested positive for COVID-- sister denies sx other than weakness/ diarrhea.  Patient also found to have UTI with postiive UA.  Patient pancultured and started on cipro/ flagyl.  Also given 1 dose zosyn.      1. Sepsis. Ischemic colitis. Pyuria. KLPN/Staph UTI. Covid-19 Viral Syndrome.   - gi, surgical f/u noted  - leukocytosis 19 if high/increasing consider repeat abd imaging  - palliative care following  - on IV zosyn [id number] #5-6; continue with abx coverage   - f/u urine cx - klpn, staph lutiensis; blood cx no growth  - not hypoxic does not require remdesivir, decadron at this time  - isolation precautions  - fu cbc  - monitor temps  - tolerating abx well so far; no side effects noted  - reason for abx use and side effects reviewed with patient  - supportive care    2. other issues - care per medicine

## 2023-10-16 LAB
ALBUMIN SERPL ELPH-MCNC: 1.8 G/DL — LOW (ref 3.3–5)
ALP SERPL-CCNC: 56 U/L — SIGNIFICANT CHANGE UP (ref 40–120)
ALT FLD-CCNC: 24 U/L — SIGNIFICANT CHANGE UP (ref 12–78)
ANION GAP SERPL CALC-SCNC: 5 MMOL/L — SIGNIFICANT CHANGE UP (ref 5–17)
AST SERPL-CCNC: 20 U/L — SIGNIFICANT CHANGE UP (ref 15–37)
BILIRUB SERPL-MCNC: 0.4 MG/DL — SIGNIFICANT CHANGE UP (ref 0.2–1.2)
BUN SERPL-MCNC: 12 MG/DL — SIGNIFICANT CHANGE UP (ref 7–23)
CALCIUM SERPL-MCNC: 8 MG/DL — LOW (ref 8.5–10.1)
CHLORIDE SERPL-SCNC: 112 MMOL/L — HIGH (ref 96–108)
CO2 SERPL-SCNC: 21 MMOL/L — LOW (ref 22–31)
CREAT SERPL-MCNC: 0.54 MG/DL — SIGNIFICANT CHANGE UP (ref 0.5–1.3)
EGFR: 91 ML/MIN/1.73M2 — SIGNIFICANT CHANGE UP
GLUCOSE SERPL-MCNC: 132 MG/DL — HIGH (ref 70–99)
HCT VFR BLD CALC: 37 % — SIGNIFICANT CHANGE UP (ref 34.5–45)
HGB BLD-MCNC: 12.4 G/DL — SIGNIFICANT CHANGE UP (ref 11.5–15.5)
INR BLD: 1.3 RATIO — HIGH (ref 0.85–1.18)
MCHC RBC-ENTMCNC: 30.7 PG — SIGNIFICANT CHANGE UP (ref 27–34)
MCHC RBC-ENTMCNC: 33.5 GM/DL — SIGNIFICANT CHANGE UP (ref 32–36)
MCV RBC AUTO: 91.6 FL — SIGNIFICANT CHANGE UP (ref 80–100)
PLATELET # BLD AUTO: 320 K/UL — SIGNIFICANT CHANGE UP (ref 150–400)
POTASSIUM SERPL-MCNC: 3.6 MMOL/L — SIGNIFICANT CHANGE UP (ref 3.5–5.3)
POTASSIUM SERPL-SCNC: 3.6 MMOL/L — SIGNIFICANT CHANGE UP (ref 3.5–5.3)
PROT SERPL-MCNC: 4.5 GM/DL — LOW (ref 6–8.3)
PROTHROM AB SERPL-ACNC: 14.6 SEC — HIGH (ref 9.5–13)
RBC # BLD: 4.04 M/UL — SIGNIFICANT CHANGE UP (ref 3.8–5.2)
RBC # FLD: 14.1 % — SIGNIFICANT CHANGE UP (ref 10.3–14.5)
SODIUM SERPL-SCNC: 138 MMOL/L — SIGNIFICANT CHANGE UP (ref 135–145)
WBC # BLD: 17.8 K/UL — HIGH (ref 3.8–10.5)
WBC # FLD AUTO: 17.8 K/UL — HIGH (ref 3.8–10.5)

## 2023-10-16 PROCEDURE — 99232 SBSQ HOSP IP/OBS MODERATE 35: CPT

## 2023-10-16 PROCEDURE — 93971 EXTREMITY STUDY: CPT | Mod: 26,LT

## 2023-10-16 PROCEDURE — 99233 SBSQ HOSP IP/OBS HIGH 50: CPT

## 2023-10-16 RX ORDER — WARFARIN SODIUM 2.5 MG/1
2 TABLET ORAL ONCE
Refills: 0 | Status: COMPLETED | OUTPATIENT
Start: 2023-10-16 | End: 2023-10-16

## 2023-10-16 RX ADMIN — SODIUM CHLORIDE 100 MILLILITER(S): 9 INJECTION INTRAMUSCULAR; INTRAVENOUS; SUBCUTANEOUS at 18:45

## 2023-10-16 RX ADMIN — Medication 650 MILLIGRAM(S): at 14:19

## 2023-10-16 RX ADMIN — Medication 50 MICROGRAM(S): at 06:25

## 2023-10-16 RX ADMIN — LISINOPRIL 5 MILLIGRAM(S): 2.5 TABLET ORAL at 09:38

## 2023-10-16 RX ADMIN — PIPERACILLIN AND TAZOBACTAM 25 GRAM(S): 4; .5 INJECTION, POWDER, LYOPHILIZED, FOR SOLUTION INTRAVENOUS at 13:25

## 2023-10-16 RX ADMIN — CARVEDILOL PHOSPHATE 3.12 MILLIGRAM(S): 80 CAPSULE, EXTENDED RELEASE ORAL at 09:38

## 2023-10-16 RX ADMIN — ATORVASTATIN CALCIUM 40 MILLIGRAM(S): 80 TABLET, FILM COATED ORAL at 23:04

## 2023-10-16 RX ADMIN — LATANOPROST 1 DROP(S): 0.05 SOLUTION/ DROPS OPHTHALMIC; TOPICAL at 23:06

## 2023-10-16 RX ADMIN — SODIUM CHLORIDE 100 MILLILITER(S): 9 INJECTION INTRAMUSCULAR; INTRAVENOUS; SUBCUTANEOUS at 09:39

## 2023-10-16 RX ADMIN — MEMANTINE HYDROCHLORIDE 10 MILLIGRAM(S): 10 TABLET ORAL at 23:05

## 2023-10-16 RX ADMIN — WARFARIN SODIUM 2 MILLIGRAM(S): 2.5 TABLET ORAL at 09:40

## 2023-10-16 RX ADMIN — QUETIAPINE FUMARATE 12.5 MILLIGRAM(S): 200 TABLET, FILM COATED ORAL at 23:04

## 2023-10-16 RX ADMIN — PIPERACILLIN AND TAZOBACTAM 25 GRAM(S): 4; .5 INJECTION, POWDER, LYOPHILIZED, FOR SOLUTION INTRAVENOUS at 06:25

## 2023-10-16 RX ADMIN — Medication 650 MILLIGRAM(S): at 13:27

## 2023-10-16 RX ADMIN — PIPERACILLIN AND TAZOBACTAM 25 GRAM(S): 4; .5 INJECTION, POWDER, LYOPHILIZED, FOR SOLUTION INTRAVENOUS at 23:03

## 2023-10-16 RX ADMIN — MEMANTINE HYDROCHLORIDE 10 MILLIGRAM(S): 10 TABLET ORAL at 09:38

## 2023-10-16 RX ADMIN — DONEPEZIL HYDROCHLORIDE 10 MILLIGRAM(S): 10 TABLET, FILM COATED ORAL at 23:05

## 2023-10-16 RX ADMIN — CARVEDILOL PHOSPHATE 3.12 MILLIGRAM(S): 80 CAPSULE, EXTENDED RELEASE ORAL at 23:04

## 2023-10-16 NOTE — PROGRESS NOTE ADULT - SUBJECTIVE AND OBJECTIVE BOX
HPI: pt seen and examined with no family at bedside, patient remains confused at this time but does not appear to be in any distress - left message for her niece/hcp Kathy     PAIN: ( )Yes   (x )No  appears comfortable   DYSPNEA: ( ) Yes  (x ) No  Level:    Review of Systems:    Unable to obtain/Limited due to: Dementia     PHYSICAL EXAM:    Vital Signs Last 24 Hrs  T(C): 37.2 (16 Oct 2023 09:20), Max: 37.2 (16 Oct 2023 09:20)  T(F): 99 (16 Oct 2023 09:20), Max: 99 (16 Oct 2023 09:20)  HR: 81 (16 Oct 2023 09:18) (80 - 81)  BP: 138/87 (16 Oct 2023 09:18) (138/87 - 151/96)  RR: 18 (16 Oct 2023 09:18) (18 - 18)  SpO2: 94% (16 Oct 2023 09:18) (94% - 94%)    Parameters below as of 16 Oct 2023 09:18  Patient On (Oxygen Delivery Method): room air    PPSV2: 30  %  FAST: unsure of baseline     General: pleasantly confused female in bed, NAD   Mental Status: alert and confused   HEENT: nasal cannula in place   Lungs: diminished b/l   Cardiac: s1s2 +   GI: nontender, nondistended, +BS   : +voiding   Ext: mild edema   Neuro: dementia       LABS:                        12.4   17.80 )-----------( 320      ( 16 Oct 2023 08:03 )             37.0     10-16    138  |  112<H>  |  12  ----------------------------<  132<H>  3.6   |  21<L>  |  0.54    Ca    8.0<L>      16 Oct 2023 08:03    TPro  4.5<L>  /  Alb  1.8<L>  /  TBili  0.4  /  DBili  x   /  AST  20  /  ALT  24  /  AlkPhos  56  10-16    PT/INR - ( 16 Oct 2023 08:03 )   PT: 14.6 sec;   INR: 1.30 ratio       Albumin: Albumin: 1.8 g/dL (10-16 @ 08:03)    Allergies    No Known Allergies    Intolerances      MEDICATIONS  (STANDING):  atorvastatin 40 milliGRAM(s) Oral at bedtime  carvedilol 3.125 milliGRAM(s) Oral every 12 hours  digoxin     Tablet 125 MICROGram(s) Oral every other day  donepezil 10 milliGRAM(s) Oral at bedtime  influenza  Vaccine (HIGH DOSE) 0.7 milliLiter(s) IntraMuscular once  latanoprost 0.005% Ophthalmic Solution 1 Drop(s) Both EYES at bedtime  levothyroxine 50 MICROGram(s) Oral daily  lisinopril 5 milliGRAM(s) Oral daily  memantine 10 milliGRAM(s) Oral two times a day  piperacillin/tazobactam IVPB.. 3.375 Gram(s) IV Intermittent every 8 hours  QUEtiapine 12.5 milliGRAM(s) Oral at bedtime  sodium chloride 0.9% Bolus 1000 milliLiter(s) IV Bolus once  sodium chloride 0.9%. 1000 milliLiter(s) (100 mL/Hr) IV Continuous <Continuous>    MEDICATIONS  (PRN):  acetaminophen     Tablet .. 650 milliGRAM(s) Oral every 6 hours PRN Temp greater or equal to 38C (100.4F), Mild Pain (1 - 3)  aluminum hydroxide/magnesium hydroxide/simethicone Suspension 30 milliLiter(s) Oral every 4 hours PRN Dyspepsia  melatonin 3 milliGRAM(s) Oral at bedtime PRN Insomnia  ondansetron Injectable 4 milliGRAM(s) IV Push every 8 hours PRN Nausea and/or Vomiting  QUEtiapine 12.5 milliGRAM(s) Oral at bedtime PRN agitation      RADIOLOGY:    ACC: [id number] EXAM:  US DPLX [facility] EXT VEINS LTD    ORDERED BY: CHAITANYA MEJIA   PROCEDURE DATE:  10/16/2023    INTERPRETATION:  CLINICAL INFORMATION: Left shoulder pain.  COMPARISON: None available.  TECHNIQUE: Duplex sonography of the LEFT UPPER extremity veins with color   and spectral Doppler, with and without compression.    FINDINGS:  The left internal jugular, subclavian, axillary, brachial, radial, and   ulnar veins are patent and compressible where applicable.  The basilic   vein (superficial vein) is patent and without thrombus.  The cephalic   vein (superficial vein) is noncompressible in the forearm.    Doppler examination shows normal spontaneous and phasic flow in the   patent vessels.    IMPRESSION:  No evidence of left upper extremity deep venous thrombosis.  Superficial thrombophlebitis of the left cephalic vein, within the   forearm.      ACC: [id number] EXAM:  CT ABDOMEN AND PELVIS IC   ORDERED BY: CALIXTO THOMPSON   ACC: [id number] EXAM:  CT CHEST IC   ORDERED BY: CALIXTO THOMPSON   PROCEDURE DATE:  10/10/2023    INTERPRETATION:  CLINICAL INFORMATION: Hypotension, left-sidedabdominal  pain  COMPARISON: None.  CONTRAST/COMPLICATIONS:  IV Contrast: Omnipaque 350 (accession [id number]), IV contrast documented   in unlinked concurrent exam (accession [id number])  90 cc administered   10   cc discarded  Oral Contrast: NONE  Complications: None reported at time of study completion    PROCEDURE:  CT of the Chest, Abdomen and Pelvis was performed.  Sagittal and coronal reformats were performed.  FINDINGS:  CHEST:  LUNGS AND LARGE AIRWAYS: Patent central airways. No consolidation.  PLEURA: Trace right pleural effusion.  VESSELS: Normal caliber thoracic aorta. Slightly dilated main pulmonary   artery.  HEART: Cardiomegaly with biatrial enlargement. No pericardial effusion.   Coronary artery calcifications.  MEDIASTINUMAND JC: No lymphadenopathy.  CHEST WALL AND LOWER NECK: Left chest wall pacemaker with the right   atrium and right ventricle. Heterogeneous and enlarged right thyroid   lobe. Irregular hypoattenuating lesion posterior to the left thyroid   lobe, possibly representing a thyroid nodule or parathyroid lesion   measuring 1.2 x 0.8 cm (2; 5).  ABDOMEN AND PELVIS:  LIVER: Scattered hypoattenuating liver lesions, incompletely   characterized, likely benign cysts and/or hemangiomas.  BILE DUCTS: Normal caliber.  GALLBLADDER: Within normal limits.  SPLEEN: Within normal limits.  PANCREAS: Within normal limits.  ADRENALS: Within normal limits.  KIDNEYS/URETERS: Within normal limits.  BLADDER: Within normal limits.  REPRODUCTIVE ORGANS: Fibroid uterus.  BOWEL: Distended and fluid-filled ascending colon with pneumatosis,   suspicious for ischemic colitis. Diffusely related to fluid-filled loops   of small bowel extending to the level of ileocecal valve, likely   representing reactive ileus. Appendix is normal.  PERITONEUM: Small volume ascites  VESSELS: Normal caliber abdominal aorta with mild scattered calcified   atherosclerotic plaque. The celiac artery, superior mesenteric artery,   and inferior mesenteric artery are grossly patent. The superior   mesenteric vein, portal vein, and splenic vein are grossly patent.  RETROPERITONEUM/LYMPH NODES: No lymphadenopathy.  ABDOMINAL WALL: Moderate anasarca. Moderate fat-containing umbilical   hernia with ascites fluid within the hernia sac.  BONES: Severe degenerative changes of bilateral hips. Degenerative   changes throughout the thoracolumbar spine.    IMPRESSION:  1.  Fluid-filled ascending colon pneumatosis, suspicious for acute   ischemic colitis.  2.  Diffusely distended small bowel loops likely representing a reactive   ileus. No transition point to suggest a small bowel obstruction.  3.  Small volume ascites, possibly reactive.    Findings of ischemic colitis were discussed with Dr. CALIXTO THOMPSON   10/10/2023 12:59 PM byDr. Neal with read back confirmation.

## 2023-10-16 NOTE — PROGRESS NOTE ADULT - ASSESSMENT
84 y/o female with PMHx of dementia, chronic AFIB on coumadin, s/p PPM, HTN, HLD, glaucoma, hypothyroid who presented to  with CC of weakness, diarrhea and near syncope at home. Patient admitted for ischemic colitis, UTI, septic shock and COVID.        # Septic Shock - Sources of infection UTI / COVID-19 / ischemic colitis.    - s/p tele monitoring  - CT abd/pelvis as above  - c/w IV zosyn  - C diff pcr negative , BCx negative   - UCx with streptococcus, klebsiella - both sensitive to zosyn   - BP now rising off home meds, restarted home coreg + lisinopril   - Suspect shock more related to UTI/ ischemic colitis.    - lactate 3.1 -> 1.5 /  WBC trending down /  Na improved    # Ischemic Colitis:  - Right colon on CT  - Appreciate surgical eval-- no plans for OR for now.    - Start CLD yesterday, ADAT   - Distended on exam but no significant tenderness on exam.    - Serial abd exams.      # Coagulopathy, supratherapeutic INR (POA)  # chronic afib (on coumadin)   - Secondary to coumadin    1.30 today after dose of vitaminK  INR very labile,   Home coumadin 3mg, will give 2mg Coumadin today.   - coumadin resumed, goal INR 2-3    # SHANA:    - Suspect all prerenal.    - Patient was on lasix at home.    - s/p 2.5 L in ER.    - NSS @ 100.    - Cr 1.78 -> 0.56    #COVID:    Patient with sx of weakness/ diarrhea but could also be explained by colitis/ UTI.    Trend.    No SOB/ cough/ fever.    Supportive care for now.    Repeat Covid test     # Diarrhea:    - C diff negative    - May be related to ischemic colitis/ COVID.      # Dementia:    Cont aricept/ namenda.    CO on floors.    Seroquel PRN.      #HTN:    Hold  Lasix/ Spironolactone with hypotension.    - BP now rising off home meds, restarted home coreg + lisinopril     #Hypothyroid:    - Cont synthroid.      #HLD:    - Cont statin.      #DVT Proph: coumadin     #Advanced directives:  as above.  DNR/ DNI.  No pressors.  No central line.

## 2023-10-16 NOTE — PROGRESS NOTE ADULT - SUBJECTIVE AND OBJECTIVE BOX
HOSPITALIST ATTENDING PROGRESS NOTE    Chart and meds reviewed.      Subjective: Patient seen and examined. Resting comfortably. Discussed wit niece in room. Family would like patient to continue to work with PT. Patient in chair and sitting. Tolerating clear liquid diet well. WBC mildly lower today to 17.80. Continue to monitor. If worsening consider repeat imaging. Continue IV abx. repeat covid test, negative. Abdomen continues to be soft with positive bowel sounds. Left arm swollen, Upper extremity US ordered.       Additional results/Imaging, I have personally reviewed:    LABS:                            12.4   17.80 )-----------( 320      ( 16 Oct 2023 08:03 )             37.0     10-16    138  |  112<H>  |  12  ----------------------------<  132<H>  3.6   |  21<L>  |  0.54    Ca    8.0<L>      16 Oct 2023 08:03    TPro  4.5<L>  /  Alb  1.8<L>  /  TBili  0.4  /  DBili  x   /  AST  20  /  ALT  24  /  AlkPhos  56  10-16        LIVER FUNCTIONS - ( 16 Oct 2023 08:03 )  Alb: 1.8 g/dL / Pro: 4.5 gm/dL / ALK PHOS: 56 U/L / ALT: 24 U/L / AST: 20 U/L / GGT: x           PT/INR - ( 16 Oct 2023 08:03 )   PT: 14.6 sec;   INR: 1.30 ratio           Urinalysis Basic - ( 16 Oct 2023 08:03 )    Color: x / Appearance: x / SG: x / pH: x  Gluc: 132 mg/dL / Ketone: x  / Bili: x / Urobili: x   Blood: x / Protein: x / Nitrite: x   Leuk Esterase: x / RBC: x / WBC x   Sq Epi: x / Non Sq Epi: x / Bacteria: x              All other systems reviewed and found to be negative with the exception of what has been described above.    MEDICATIONS  (STANDING):  atorvastatin 40 milliGRAM(s) Oral at bedtime  carvedilol 3.125 milliGRAM(s) Oral every 12 hours  digoxin     Tablet 125 MICROGram(s) Oral every other day  donepezil 10 milliGRAM(s) Oral at bedtime  influenza  Vaccine (HIGH DOSE) 0.7 milliLiter(s) IntraMuscular once  latanoprost 0.005% Ophthalmic Solution 1 Drop(s) Both EYES at bedtime  levothyroxine 50 MICROGram(s) Oral daily  lisinopril 5 milliGRAM(s) Oral daily  memantine 10 milliGRAM(s) Oral two times a day  piperacillin/tazobactam IVPB.. 3.375 Gram(s) IV Intermittent every 8 hours  QUEtiapine 12.5 milliGRAM(s) Oral at bedtime  sodium chloride 0.9% Bolus 1000 milliLiter(s) IV Bolus once  sodium chloride 0.9%. 1000 milliLiter(s) (100 mL/Hr) IV Continuous <Continuous>    MEDICATIONS  (PRN):  acetaminophen     Tablet .. 650 milliGRAM(s) Oral every 6 hours PRN Temp greater or equal to 38C (100.4F), Mild Pain (1 - 3)  aluminum hydroxide/magnesium hydroxide/simethicone Suspension 30 milliLiter(s) Oral every 4 hours PRN Dyspepsia  melatonin 3 milliGRAM(s) Oral at bedtime PRN Insomnia  ondansetron Injectable 4 milliGRAM(s) IV Push every 8 hours PRN Nausea and/or Vomiting  QUEtiapine 12.5 milliGRAM(s) Oral at bedtime PRN agitation      VITALS:  T(F): 99 (10-16-23 @ 09:20), Max: 99 (10-16-23 @ 09:20)  HR: 81 (10-16-23 @ 09:18) (80 - 81)  BP: 138/87 (10-16-23 @ 09:18) (138/87 - 151/96)  RR: 18 (10-16-23 @ 09:18) (18 - 18)  SpO2: 94% (10-16-23 @ 09:18) (94% - 94%)  Wt(kg): --    I&O's Summary    15 Oct 2023 07:01  -  16 Oct 2023 07:00  --------------------------------------------------------  IN: 968 mL / OUT: 0 mL / NET: 968 mL        CAPILLARY BLOOD GLUCOSE          PHYSICAL EXAM:  Gen: No acute distress   HEENT:  pupils equal and reactive, EOMI,   NECK:   supple, no carotid bruits, No JVD  CV:  +S1, +S2, regular rate rhythm, no murmurs or rubs  RESP:   lungs clear to auscultation bilaterally, no wheezing, rales, rhonchi, good air entry bilaterally  GI:  abdomen soft, non-tender, non-distended, normal BS, no bruits, no abdominal masses, no palpable masses  MSK:   normal muscle tone, no atrophy, no rigidity, no contractions  EXT:  no clubbing, no cyanosis, no edema, no calf pain, swelling or erythema  VASCULAR:  pulses equal and symmetric in the upper and lower extremities  NEURO:  AAOX3, no focal neurological deficits, follows all commands, able to move extremities spontaneously  SKIN:  no ulcers, lesions or rashes      CULTURES:      Telemetry, personally reviewed

## 2023-10-17 LAB
ALBUMIN SERPL ELPH-MCNC: 2.1 G/DL — LOW (ref 3.3–5)
ALBUMIN SERPL ELPH-MCNC: 2.1 G/DL — LOW (ref 3.3–5)
ALP SERPL-CCNC: 61 U/L — SIGNIFICANT CHANGE UP (ref 40–120)
ALP SERPL-CCNC: 61 U/L — SIGNIFICANT CHANGE UP (ref 40–120)
ALT FLD-CCNC: 27 U/L — SIGNIFICANT CHANGE UP (ref 12–78)
ALT FLD-CCNC: 27 U/L — SIGNIFICANT CHANGE UP (ref 12–78)
ANION GAP SERPL CALC-SCNC: 5 MMOL/L — SIGNIFICANT CHANGE UP (ref 5–17)
ANION GAP SERPL CALC-SCNC: 5 MMOL/L — SIGNIFICANT CHANGE UP (ref 5–17)
APTT BLD: 29.5 SEC — SIGNIFICANT CHANGE UP (ref 24.5–35.6)
APTT BLD: 29.5 SEC — SIGNIFICANT CHANGE UP (ref 24.5–35.6)
AST SERPL-CCNC: 19 U/L — SIGNIFICANT CHANGE UP (ref 15–37)
AST SERPL-CCNC: 19 U/L — SIGNIFICANT CHANGE UP (ref 15–37)
BILIRUB SERPL-MCNC: 0.5 MG/DL — SIGNIFICANT CHANGE UP (ref 0.2–1.2)
BILIRUB SERPL-MCNC: 0.5 MG/DL — SIGNIFICANT CHANGE UP (ref 0.2–1.2)
BUN SERPL-MCNC: 10 MG/DL — SIGNIFICANT CHANGE UP (ref 7–23)
BUN SERPL-MCNC: 10 MG/DL — SIGNIFICANT CHANGE UP (ref 7–23)
CALCIUM SERPL-MCNC: 7.5 MG/DL — LOW (ref 8.5–10.1)
CALCIUM SERPL-MCNC: 7.5 MG/DL — LOW (ref 8.5–10.1)
CHLORIDE SERPL-SCNC: 109 MMOL/L — HIGH (ref 96–108)
CHLORIDE SERPL-SCNC: 109 MMOL/L — HIGH (ref 96–108)
CO2 SERPL-SCNC: 23 MMOL/L — SIGNIFICANT CHANGE UP (ref 22–31)
CO2 SERPL-SCNC: 23 MMOL/L — SIGNIFICANT CHANGE UP (ref 22–31)
CREAT SERPL-MCNC: 0.55 MG/DL — SIGNIFICANT CHANGE UP (ref 0.5–1.3)
CREAT SERPL-MCNC: 0.55 MG/DL — SIGNIFICANT CHANGE UP (ref 0.5–1.3)
EGFR: 91 ML/MIN/1.73M2 — SIGNIFICANT CHANGE UP
EGFR: 91 ML/MIN/1.73M2 — SIGNIFICANT CHANGE UP
GLUCOSE SERPL-MCNC: 105 MG/DL — HIGH (ref 70–99)
GLUCOSE SERPL-MCNC: 105 MG/DL — HIGH (ref 70–99)
HCT VFR BLD CALC: 38.4 % — SIGNIFICANT CHANGE UP (ref 34.5–45)
HCT VFR BLD CALC: 38.4 % — SIGNIFICANT CHANGE UP (ref 34.5–45)
HGB BLD-MCNC: 13 G/DL — SIGNIFICANT CHANGE UP (ref 11.5–15.5)
HGB BLD-MCNC: 13 G/DL — SIGNIFICANT CHANGE UP (ref 11.5–15.5)
INR BLD: 1.61 RATIO — HIGH (ref 0.85–1.18)
INR BLD: 1.61 RATIO — HIGH (ref 0.85–1.18)
MCHC RBC-ENTMCNC: 31.1 PG — SIGNIFICANT CHANGE UP (ref 27–34)
MCHC RBC-ENTMCNC: 31.1 PG — SIGNIFICANT CHANGE UP (ref 27–34)
MCHC RBC-ENTMCNC: 33.9 GM/DL — SIGNIFICANT CHANGE UP (ref 32–36)
MCHC RBC-ENTMCNC: 33.9 GM/DL — SIGNIFICANT CHANGE UP (ref 32–36)
MCV RBC AUTO: 91.9 FL — SIGNIFICANT CHANGE UP (ref 80–100)
MCV RBC AUTO: 91.9 FL — SIGNIFICANT CHANGE UP (ref 80–100)
PLATELET # BLD AUTO: 352 K/UL — SIGNIFICANT CHANGE UP (ref 150–400)
PLATELET # BLD AUTO: 352 K/UL — SIGNIFICANT CHANGE UP (ref 150–400)
POTASSIUM SERPL-MCNC: 3.4 MMOL/L — LOW (ref 3.5–5.3)
POTASSIUM SERPL-MCNC: 3.4 MMOL/L — LOW (ref 3.5–5.3)
POTASSIUM SERPL-SCNC: 3.4 MMOL/L — LOW (ref 3.5–5.3)
POTASSIUM SERPL-SCNC: 3.4 MMOL/L — LOW (ref 3.5–5.3)
PROT SERPL-MCNC: 4.6 GM/DL — LOW (ref 6–8.3)
PROT SERPL-MCNC: 4.6 GM/DL — LOW (ref 6–8.3)
PROTHROM AB SERPL-ACNC: 17.9 SEC — HIGH (ref 9.5–13)
PROTHROM AB SERPL-ACNC: 17.9 SEC — HIGH (ref 9.5–13)
RBC # BLD: 4.18 M/UL — SIGNIFICANT CHANGE UP (ref 3.8–5.2)
RBC # BLD: 4.18 M/UL — SIGNIFICANT CHANGE UP (ref 3.8–5.2)
RBC # FLD: 13.9 % — SIGNIFICANT CHANGE UP (ref 10.3–14.5)
RBC # FLD: 13.9 % — SIGNIFICANT CHANGE UP (ref 10.3–14.5)
SODIUM SERPL-SCNC: 137 MMOL/L — SIGNIFICANT CHANGE UP (ref 135–145)
SODIUM SERPL-SCNC: 137 MMOL/L — SIGNIFICANT CHANGE UP (ref 135–145)
WBC # BLD: 18.42 K/UL — HIGH (ref 3.8–10.5)
WBC # BLD: 18.42 K/UL — HIGH (ref 3.8–10.5)
WBC # FLD AUTO: 18.42 K/UL — HIGH (ref 3.8–10.5)
WBC # FLD AUTO: 18.42 K/UL — HIGH (ref 3.8–10.5)

## 2023-10-17 PROCEDURE — 99232 SBSQ HOSP IP/OBS MODERATE 35: CPT

## 2023-10-17 PROCEDURE — 99232 SBSQ HOSP IP/OBS MODERATE 35: CPT | Mod: GC

## 2023-10-17 PROCEDURE — 71275 CT ANGIOGRAPHY CHEST: CPT | Mod: 26

## 2023-10-17 PROCEDURE — 74177 CT ABD & PELVIS W/CONTRAST: CPT | Mod: 26

## 2023-10-17 RX ORDER — WARFARIN SODIUM 2.5 MG/1
3 TABLET ORAL AT BEDTIME
Refills: 0 | Status: COMPLETED | OUTPATIENT
Start: 2023-10-17 | End: 2023-10-17

## 2023-10-17 RX ORDER — POTASSIUM CHLORIDE 20 MEQ
40 PACKET (EA) ORAL EVERY 4 HOURS
Refills: 0 | Status: COMPLETED | OUTPATIENT
Start: 2023-10-17 | End: 2023-10-17

## 2023-10-17 RX ORDER — SODIUM CHLORIDE 9 MG/ML
1000 INJECTION INTRAMUSCULAR; INTRAVENOUS; SUBCUTANEOUS
Refills: 0 | Status: DISCONTINUED | OUTPATIENT
Start: 2023-10-17 | End: 2023-10-18

## 2023-10-17 RX ADMIN — PIPERACILLIN AND TAZOBACTAM 25 GRAM(S): 4; .5 INJECTION, POWDER, LYOPHILIZED, FOR SOLUTION INTRAVENOUS at 15:02

## 2023-10-17 RX ADMIN — PIPERACILLIN AND TAZOBACTAM 25 GRAM(S): 4; .5 INJECTION, POWDER, LYOPHILIZED, FOR SOLUTION INTRAVENOUS at 06:48

## 2023-10-17 RX ADMIN — LISINOPRIL 5 MILLIGRAM(S): 2.5 TABLET ORAL at 09:14

## 2023-10-17 RX ADMIN — LATANOPROST 1 DROP(S): 0.05 SOLUTION/ DROPS OPHTHALMIC; TOPICAL at 21:24

## 2023-10-17 RX ADMIN — SODIUM CHLORIDE 100 MILLILITER(S): 9 INJECTION INTRAMUSCULAR; INTRAVENOUS; SUBCUTANEOUS at 15:03

## 2023-10-17 RX ADMIN — WARFARIN SODIUM 3 MILLIGRAM(S): 2.5 TABLET ORAL at 21:23

## 2023-10-17 RX ADMIN — DONEPEZIL HYDROCHLORIDE 10 MILLIGRAM(S): 10 TABLET, FILM COATED ORAL at 21:22

## 2023-10-17 RX ADMIN — Medication 50 MICROGRAM(S): at 06:49

## 2023-10-17 RX ADMIN — Medication 40 MILLIEQUIVALENT(S): at 21:23

## 2023-10-17 RX ADMIN — ATORVASTATIN CALCIUM 40 MILLIGRAM(S): 80 TABLET, FILM COATED ORAL at 21:22

## 2023-10-17 RX ADMIN — Medication 40 MILLIEQUIVALENT(S): at 15:30

## 2023-10-17 RX ADMIN — MEMANTINE HYDROCHLORIDE 10 MILLIGRAM(S): 10 TABLET ORAL at 21:22

## 2023-10-17 RX ADMIN — QUETIAPINE FUMARATE 12.5 MILLIGRAM(S): 200 TABLET, FILM COATED ORAL at 21:22

## 2023-10-17 RX ADMIN — CARVEDILOL PHOSPHATE 3.12 MILLIGRAM(S): 80 CAPSULE, EXTENDED RELEASE ORAL at 09:13

## 2023-10-17 RX ADMIN — MEMANTINE HYDROCHLORIDE 10 MILLIGRAM(S): 10 TABLET ORAL at 09:13

## 2023-10-17 RX ADMIN — Medication 125 MICROGRAM(S): at 09:13

## 2023-10-17 RX ADMIN — CARVEDILOL PHOSPHATE 3.12 MILLIGRAM(S): 80 CAPSULE, EXTENDED RELEASE ORAL at 21:22

## 2023-10-17 NOTE — PROVIDER CONTACT NOTE (OTHER) - SITUATION
Pt's IV located in left FA infiltrated. Pt's arm and hand are swollen. Pt denies numbness or tingling @ site.
Pt's arm moderately edematous.

## 2023-10-17 NOTE — PROGRESS NOTE ADULT - NS ATTEND AMEND GEN_ALL_CORE FT
83 year old woman with dementia, with sepsis from UTI and covid; also with ischemic colitis, now without bloody BM's and just loose stools.     F/u GI PCR.

## 2023-10-17 NOTE — PROVIDER CONTACT NOTE (OTHER) - BACKGROUND
Pt came in with near syncopy episode/ diarrhea. Pt's cdiff -. Pt on IVF @ 100.
Pt transferred from [address] [place] [address] with complaints of diarrhea and syncopy. Pt with ischemic colitis and UTI on zosyn.

## 2023-10-17 NOTE — OCCUPATIONAL THERAPY INITIAL EVALUATION ADULT - RANGE OF MOTION EXAMINATION, UPPER EXTREMITY
b/l WFL grossly assessed difficulty following 2/2 cogniton, excpet impaired b/l shoulder flexion ~0-30, full PROM

## 2023-10-17 NOTE — OCCUPATIONAL THERAPY INITIAL EVALUATION ADULT - ADL RETRAINING, OT EVAL
Patient will participate in lower body dressing with mod a   in 2 weeks  Patient will participate in toilet transfer with max x1   in 2 weeks  Patient will participate in toileting with  mod a    in 2 weeks  Patient will participate in grooming standing at the sink with max x1   in 2 weeks

## 2023-10-17 NOTE — OCCUPATIONAL THERAPY INITIAL EVALUATION ADULT - GENERAL OBSERVATIONS, REHAB EVAL
Patient received semi-supine in bed, NAD, vss  . Patient left  seated in chair with chair alarm on   , VSS, NAD, all lines intact and all items within reach.

## 2023-10-17 NOTE — PROGRESS NOTE ADULT - SUBJECTIVE AND OBJECTIVE BOX
Patient is a 83y old  Female who presents with a chief complaint of weakness, diarrhea  Followup: ischemic colitis, sepsis, UTI, Covid  Family requested that GI re-evaluate patient. Patient has had dementia/delirium with moments of wakefulness. Currently with minimal responses to verbal cues. Sitting in chair at bedside. Opened eyes upon introduction then closed. In no apparent distress. With leukocytosis, and per nursing large nonbloody loose stool this morning.     MEDICATIONS  (STANDING):  atorvastatin 40 milliGRAM(s) Oral at bedtime  carvedilol 3.125 milliGRAM(s) Oral every 12 hours  digoxin     Tablet 125 MICROGram(s) Oral every other day  donepezil 10 milliGRAM(s) Oral at bedtime  influenza  Vaccine (HIGH DOSE) 0.7 milliLiter(s) IntraMuscular once  latanoprost 0.005% Ophthalmic Solution 1 Drop(s) Both EYES at bedtime  levothyroxine 50 MICROGram(s) Oral daily  lisinopril 5 milliGRAM(s) Oral daily  memantine 10 milliGRAM(s) Oral two times a day  piperacillin/tazobactam IVPB.. 3.375 Gram(s) IV Intermittent every 8 hours  potassium chloride   Powder 40 milliEquivalent(s) Oral every 4 hours  QUEtiapine 12.5 milliGRAM(s) Oral at bedtime  sodium chloride 0.9% Bolus 1000 milliLiter(s) IV Bolus once  sodium chloride 0.9%. 1000 milliLiter(s) (100 mL/Hr) IV Continuous <Continuous>    MEDICATIONS  (PRN):  acetaminophen     Tablet .. 650 milliGRAM(s) Oral every 6 hours PRN Temp greater or equal to 38C (100.4F), Mild Pain (1 - 3)  aluminum hydroxide/magnesium hydroxide/simethicone Suspension 30 milliLiter(s) Oral every 4 hours PRN Dyspepsia  melatonin 3 milliGRAM(s) Oral at bedtime PRN Insomnia  ondansetron Injectable 4 milliGRAM(s) IV Push every 8 hours PRN Nausea and/or Vomiting  QUEtiapine 12.5 milliGRAM(s) Oral at bedtime PRN agitation      Vital Signs Last 24 Hrs  T(C): 36.6 (17 Oct 2023 08:20), Max: 36.8 (16 Oct 2023 23:41)  T(F): 97.9 (17 Oct 2023 08:20), Max: 98.2 (16 Oct 2023 23:41)  HR: 75 (17 Oct 2023 08:20) (75 - 88)  BP: 130/65 (17 Oct 2023 08:20) (104/67 - 130/65)  BP(mean): --  RR: 18 (17 Oct 2023 08:20) (18 - 18)  SpO2: 95% (17 Oct 2023 08:20) (92% - 95%)    Parameters below as of 17 Oct 2023 08:20  Patient On (Oxygen Delivery Method): room air        PHYSICAL EXAM:    Constitutional: No acute distress, non-toxic appearing  HEENT: not icteric  Neck: supple, no lymphadenopathy  Respiratory: clear to ascultation bilaterally, no wheezing  Cardiovascular: S1 and S2, regular rate and rhythm, no murmurs rubs or gallops  Gastrointestinal: soft, non-tender, softly distended, +bowel sounds, no rebound or guarding, no surgical scars, no drains  Extremities: No peripheral edema, no cyanosis or clubbing  Vascular: 2+ peripheral pulses, no venous stasis  Neurological: A/O x 3, no focal deficits, no asterixis  Psychiatric: Normal mood, normal affect  Skin: No rashes, not jaundiced    LABS:                        13.0   18.42 )-----------( 352      ( 17 Oct 2023 07:48 )             38.4     10-17    137  |  109<H>  |  10  ----------------------------<  105<H>  3.4<L>   |  23  |  0.55    Ca    7.5<L>      17 Oct 2023 07:48    TPro  4.6<L>  /  Alb  2.1<L>  /  TBili  0.5  /  DBili  x   /  AST  19  /  ALT  27  /  AlkPhos  61  10-17    PT/INR - ( 17 Oct 2023 07:48 )   PT: 17.9 sec;   INR: 1.61 ratio         PTT - ( 17 Oct 2023 07:48 )  PTT:29.5 sec  LIVER FUNCTIONS - ( 17 Oct 2023 07:48 )  Alb: 2.1 g/dL / Pro: 4.6 gm/dL / ALK PHOS: 61 U/L / ALT: 27 U/L / AST: 19 U/L / GGT: x             RADIOLOGY & ADDITIONAL STUDIES: Patient is a 83y old  Female who presents with a chief complaint of weakness, diarrhea    Followup: ischemic colitis, sepsis, UTI, Covid    Family requested that GI re-evaluate patient. Patient has had dementia/delirium with moments of wakefulness. Currently with minimal responses to verbal cues. Sitting in chair at bedside. Opened eyes upon introduction then closed. In no apparent distress. With leukocytosis, and per nursing large nonbloody loose stool this morning.     MEDICATIONS  (STANDING):  atorvastatin 40 milliGRAM(s) Oral at bedtime  carvedilol 3.125 milliGRAM(s) Oral every 12 hours  digoxin     Tablet 125 MICROGram(s) Oral every other day  donepezil 10 milliGRAM(s) Oral at bedtime  influenza  Vaccine (HIGH DOSE) 0.7 milliLiter(s) IntraMuscular once  latanoprost 0.005% Ophthalmic Solution 1 Drop(s) Both EYES at bedtime  levothyroxine 50 MICROGram(s) Oral daily  lisinopril 5 milliGRAM(s) Oral daily  memantine 10 milliGRAM(s) Oral two times a day  piperacillin/tazobactam IVPB.. 3.375 Gram(s) IV Intermittent every 8 hours  potassium chloride   Powder 40 milliEquivalent(s) Oral every 4 hours  QUEtiapine 12.5 milliGRAM(s) Oral at bedtime  sodium chloride 0.9% Bolus 1000 milliLiter(s) IV Bolus once  sodium chloride 0.9%. 1000 milliLiter(s) (100 mL/Hr) IV Continuous <Continuous>    MEDICATIONS  (PRN):  acetaminophen     Tablet .. 650 milliGRAM(s) Oral every 6 hours PRN Temp greater or equal to 38C (100.4F), Mild Pain (1 - 3)  aluminum hydroxide/magnesium hydroxide/simethicone Suspension 30 milliLiter(s) Oral every 4 hours PRN Dyspepsia  melatonin 3 milliGRAM(s) Oral at bedtime PRN Insomnia  ondansetron Injectable 4 milliGRAM(s) IV Push every 8 hours PRN Nausea and/or Vomiting  QUEtiapine 12.5 milliGRAM(s) Oral at bedtime PRN agitation      Vital Signs Last 24 Hrs  T(C): 36.6 (17 Oct 2023 08:20), Max: 36.8 (16 Oct 2023 23:41)  T(F): 97.9 (17 Oct 2023 08:20), Max: 98.2 (16 Oct 2023 23:41)  HR: 75 (17 Oct 2023 08:20) (75 - 88)  BP: 130/65 (17 Oct 2023 08:20) (104/67 - 130/65)  BP(mean): --  RR: 18 (17 Oct 2023 08:20) (18 - 18)  SpO2: 95% (17 Oct 2023 08:20) (92% - 95%)    Parameters below as of 17 Oct 2023 08:20  Patient On (Oxygen Delivery Method): room air        PHYSICAL EXAM:    Constitutional: elderly, non-toxic appearing  HEENT: not icteric  Neck: supple, no lymphadenopathy  Respiratory: clear to ascultation bilaterally, no wheezing  Cardiovascular: S1 and S2, regular rate and rhythm, no murmurs rubs or gallops  Gastrointestinal: soft, non-tender, softly distended, +bowel sounds, no rebound or guarding,   Extremities: No peripheral edema, no cyanosis or clubbing  Vascular: 2+ peripheral pulses, no venous stasis  Neurological: A/O x 3, no focal deficits, no asterixis  Psychiatric: Normal mood, normal affect  Skin: No rashes, not jaundiced    LABS:                        13.0   18.42 )-----------( 352      ( 17 Oct 2023 07:48 )             38.4     10-17    137  |  109<H>  |  10  ----------------------------<  105<H>  3.4<L>   |  23  |  0.55    Ca    7.5<L>      17 Oct 2023 07:48    TPro  4.6<L>  /  Alb  2.1<L>  /  TBili  0.5  /  DBili  x   /  AST  19  /  ALT  27  /  AlkPhos  61  10-17    PT/INR - ( 17 Oct 2023 07:48 )   PT: 17.9 sec;   INR: 1.61 ratio         PTT - ( 17 Oct 2023 07:48 )  PTT:29.5 sec  LIVER FUNCTIONS - ( 17 Oct 2023 07:48 )  Alb: 2.1 g/dL / Pro: 4.6 gm/dL / ALK PHOS: 61 U/L / ALT: 27 U/L / AST: 19 U/L / GGT: x

## 2023-10-17 NOTE — PROGRESS NOTE ADULT - SUBJECTIVE AND OBJECTIVE BOX
Patient seen and examined  sitting upright in chair,  although eyes closed. non responsive to verbal nor tactile stimuli on exam  vitals stable   white count at 19  diet advanced to puree    Review of Systems:  unable    Objective:  Vitals  T(C): 36.6 (10-17-23 @ 08:20), Max: 36.8 (10-16-23 @ 23:41)  HR: 75 (10-17-23 @ 08:20) (75 - 88)  BP: 130/65 (10-17-23 @ 08:20) (104/67 - 130/65)  RR: 18 (10-17-23 @ 08:20) (18 - 18)  SpO2: 95% (10-17-23 @ 08:20) (92% - 95%)    Physical Exam:  General: comfortable, no acute distress  HEENT: Atraumatic, no LAD, trachea midline, PERRLA  Cardiovascular: normal s1s2, no murmurs, gallops or fricition rubs  Pulmonary: clear to ausculation Bilaterally, no wheezing , rhonchi  Gastrointestinal: soft non tender non distended, no masses felt, no organomegally  Muscloskeletal: no lower extremity edema, intact bilateral lower extremity pulses  Neurological: CN II-12 intact. No focal weakness.   Psychiatrical: normal mood, cooperative  SKIN: no rash, lesions or ulcers    Labs:                          13.0   18.42 )-----------( 352      ( 17 Oct 2023 07:48 )             38.4     10-17    137  |  109<H>  |  10  ----------------------------<  105<H>  3.4<L>   |  23  |  0.55    Ca    7.5<L>      17 Oct 2023 07:48    TPro  4.6<L>  /  Alb  2.1<L>  /  TBili  0.5  /  DBili  x   /  AST  19  /  ALT  27  /  AlkPhos  61  10-17    LIVER FUNCTIONS - ( 17 Oct 2023 07:48 )  Alb: 2.1 g/dL / Pro: 4.6 gm/dL / ALK PHOS: 61 U/L / ALT: 27 U/L / AST: 19 U/L / GGT: x           PT/INR - ( 17 Oct 2023 07:48 )   PT: 17.9 sec;   INR: 1.61 ratio         PTT - ( 17 Oct 2023 07:48 )  PTT:29.5 sec      Active Medications  MEDICATIONS  (STANDING):  atorvastatin 40 milliGRAM(s) Oral at bedtime  carvedilol 3.125 milliGRAM(s) Oral every 12 hours  digoxin     Tablet 125 MICROGram(s) Oral every other day  donepezil 10 milliGRAM(s) Oral at bedtime  influenza  Vaccine (HIGH DOSE) 0.7 milliLiter(s) IntraMuscular once  latanoprost 0.005% Ophthalmic Solution 1 Drop(s) Both EYES at bedtime  levothyroxine 50 MICROGram(s) Oral daily  lisinopril 5 milliGRAM(s) Oral daily  memantine 10 milliGRAM(s) Oral two times a day  piperacillin/tazobactam IVPB.. 3.375 Gram(s) IV Intermittent every 8 hours  potassium chloride   Powder 40 milliEquivalent(s) Oral every 4 hours  QUEtiapine 12.5 milliGRAM(s) Oral at bedtime  sodium chloride 0.9% Bolus 1000 milliLiter(s) IV Bolus once  sodium chloride 0.9%. 1000 milliLiter(s) (100 mL/Hr) IV Continuous <Continuous>  warfarin 3 milliGRAM(s) Oral at bedtime    MEDICATIONS  (PRN):  acetaminophen     Tablet .. 650 milliGRAM(s) Oral every 6 hours PRN Temp greater or equal to 38C (100.4F), Mild Pain (1 - 3)  aluminum hydroxide/magnesium hydroxide/simethicone Suspension 30 milliLiter(s) Oral every 4 hours PRN Dyspepsia  melatonin 3 milliGRAM(s) Oral at bedtime PRN Insomnia  ondansetron Injectable 4 milliGRAM(s) IV Push every 8 hours PRN Nausea and/or Vomiting  QUEtiapine 12.5 milliGRAM(s) Oral at bedtime PRN agitation

## 2023-10-17 NOTE — PROGRESS NOTE ADULT - NS ATTEND BILL GEN_ALL_CORE
Telephone Encounter by Christian Collins, RN at 04/17/17 04:45 PM     Author:  Christian Collins, RN Service:  (none) Author Type:  Registered Nurse     Filed:  04/17/17 04:46 PM Encounter Date:  4/17/2017 Status:  Signed     :  Christian Collins RN (Registered Nurse)            I spoke with mom. Advised her that Nathaniel should probably be checked since she has had the symptoms for so long. Patient scheduled to be seen by Dr. GARCIA at 11:15am tomorrow.    Message to Dr. GARCIA - STANLEY[[email or web address]]      Revision History        User Key Date/Time User Provider Type Action    > RL1.1 04/17/17 04:46 PM Christian Collins, RN Registered Nurse Sign    M - Manual             Attending to bill

## 2023-10-17 NOTE — PROGRESS NOTE ADULT - ASSESSMENT
84 y/o female with PMHx of dementia, chronic AFIB on coumadin, s/p PPM, HTN, HLD, glaucoma, hypothyroid who presented to  with CC of weakness, diarrhea and near syncope at home. Patient admitted for ischemic colitis, UTI, septic shock and COVID.      Septic Shock - Sources of infection UTI / COVID-19 / ischemic colitis.    - CT abd/pelvis as above  - onIV zosyn  - C diff pcr negative , BCx negative   - UCx with streptococcus, klebsiella - both sensitive to zosyn   - BP now rising off home meds, restarted home coreg + lisinopril   - Suspect shock more related to UTI/ ischemic colitis.    - lactate 3.1 -> 1.5 /  WBC trending down /  Na improved  - white count persistently high  plan:  rescan patient today (chest/abdomen/pelvis)    # Ischemic Colitis:  - Right colon on CT  - Appreciate surgical eval-- no plans for OR for now.    - Start CLD yesterday, ADAT   - Distended on exam but no significant tenderness on exam.    - white count high  plan:  -ordered for GI PCR  followup CT scans  - Serial abd exams.      Coagulopathy, supratherapeutic INR (POA)  chronic afib (on coumadin)    Secondary to coumadin   -s/p Vitamin K  INR now 1.6  plan:  resume coumadin (2mg/ 3mg alternating)  coumadin resumed, goal INR 2-3    SHANA:  resolved  - Suspect all prerenal.    - Patient was on lasix at home.    - s/p 2.5 L in ER.    - Cr 1.78 -> 0.56    #COVID:    Patient with sx of weakness/ diarrhea but could also be explained by colitis/ UTI.    Trend.    No SOB/ cough/ fever.    Supportive care for now.    Repeat Covid test on the 15th negative  plan:  patient likely will require 5 days of iso. will eval tomorrow if iso can be removed    # Diarrhea:    - C diff negative    - May be related to ischemic colitis/ COVID.    plan:  check GI PCR  GI consulted ( recs appreciated)    # Dementia:    Cont aricept/ namenda.    CO on floors.    Seroquel PRN.      #HTN:    Hold  Lasix/ Spironolactone with hypotension.    - BP now rising off home meds, restarted home coreg + lisinopril     #Hypothyroid:    - Cont synthroid.      #HLD:    - Cont statin.      #DVT Proph: coumadin     #Advanced directives:  as above.  DNR/ DNI.  No pressors.  No central line.

## 2023-10-17 NOTE — PROGRESS NOTE ADULT - NSPROGADDITIONALINFOA_GEN_ALL_CORE
patient eyes closed, not eating this morning  white count is 19  patient has large loose BM  will rescan patient and look for infection source  trend wbc tomorrow  wbc may be due to dehydration

## 2023-10-17 NOTE — OCCUPATIONAL THERAPY INITIAL EVALUATION ADULT - PHYSICAL ASSIST/NONPHYSICAL ASSIST: BED TO CHAIR, REHAB EVAL
CARDIOLOGY FOLLOW-UP NOTE     Requesting Attending:  Gavino Cooper MD   Consulting Attending:  Shantanu Diallo MD  Consulting Service:  cardiology  PCP:  Lucas Calvo MD  Date of Service:  2/21/2023    Subjective    No new complaints.  Records from [facility] reviewed where he was admitted when he suffered a stroke.  Was seen both by neurology and cardiology at the time.  BO did reveal a PFO.  Given his age, patient was not considered a candidate for PFO closure but was to be followed by cardiology and neurology as an outpatient regarding this.  Venous Dopplers were negative for DVT  Also had a 30-day event monitor which did not reveal any occult atrial fibrillation.  He has been on this dose of Eliquis since then.            Objective      Physical Exam    Vital Last Value 24 Hour Range   Temperature 97.3 °F (36.3 °C) (02/21/23 0526) Temp  Min: 97.3 °F (36.3 °C)  Max: 97.7 °F (36.5 °C)   Pulse (!) 50 (02/21/23 0526) Pulse  Min: 50  Max: 51   Respiratory 17 (02/21/23 0526) Resp  Min: 16  Max: 17   Non-Invasive  Blood Pressure (!) 149/56 (02/21/23 0526) BP  Min: 129/62  Max: 149/56   Arterial   Blood Pressure   No data recorded   Pulse Oximetry 98 % (02/21/23 0526) SpO2  Min: 97 %  Max: 99 %     Vital Today Admitted   Weight 74.8 kg (165 lb) (02/17/23 1800) Weight: 74.8 kg (165 lb) (02/17/23 1329)   Height N/A Height: 5' 6\" (167.6 cm) (02/17/23 1329)   BMI N/A BMI (Calculated): 26.63 (02/17/23 1329)     GENERAL: Alert, cooperative, conversive in no acute distress.   HEENT: Normocephalic-atraumatic.  Sclera and conjunctiva are normal.  NECK: No JVD.   CHEST: Symmetrical expansion.  No deformity.  No retractions.  No tenderness to palpation.  LUNGS: Normal first heart sound normal second heart sound  CARDIOVASCULAR: RRR without murmur.  Normal S1 and S2.    ABDOMEN: Non-distended. Non-tender. Positive bowel sounds in all four quadrants. No hepatomegaly. No splenomegaly.  No masses.  EXTREMITIES: No edema.   No deformity.  Distal pulses palpable.  NEURO: Left-sided weakness        Laboratory Results:    No results found for: TROP  No components found for: TROPONIN I    Lab Results   Component Value Date    SODIUM 144 02/20/2023    POTASSIUM 4.3 02/20/2023    CHLORIDE 109 02/20/2023    CO2 29 02/20/2023    ANIONGAP 10 02/20/2023    BUN 22 (H) 02/20/2023    CREATININE 0.82 02/20/2023    CALCIUM 8.2 (L) 02/20/2023    GLUCOSE 111 (H) 02/20/2023     (H) 02/19/2023    WBC 8.5 02/20/2023    HCT 34.6 (L) 02/20/2023     Lab Results   Component Value Date    HGB 11.3 (L) 02/20/2023     02/20/2023    INR 1.1 02/18/2023    PTT <21 (L) 02/17/2023    MG 1.8 02/18/2023    ALKPT 64 02/20/2023    BILIRUBIN 0.5 02/20/2023    AST 30 02/20/2023    GPT 34 02/20/2023    ALBUMIN 2.4 (L) 02/20/2023          Data:              CURRENT MEDICATIONS  Current Facility-Administered Medications   Medication Dose Route Frequency Provider Last Rate Last Admin   • apixaBAN (ELIQUIS) tablet 2.5 mg  2.5 mg Oral BID Gavino Cooper MD   2.5 mg at 02/21/23 0805   • folic acid (FOLATE) tablet 1 mg  1 mg Oral Daily Gavino Cooper MD   1 mg at 02/21/23 0805   • levothyroxine (SYNTHROID, LEVOTHROID) tablet 25 mcg  25 mcg Oral Daily Gavino Cooper MD   25 mcg at 02/21/23 0805   • losartan (COZAAR) tablet 25 mg  25 mg Oral Daily Gavino Cooper MD   25 mg at 02/21/23 0805   • sertraline (ZOLOFT) tablet 25 mg  25 mg Oral Daily Gavino Cooper MD   25 mg at 02/21/23 0805   • sodium chloride 0.9 % flush bag 25 mL  25 mL Intravenous PRN Gavino Cooper MD       • sodium chloride (PF) 0.9 % injection 2 mL  2 mL Intracatheter 2 times per day Gvaino Cooper MD   2 mL at 02/21/23 0806   • acetaminophen (TYLENOL) tablet 650 mg  650 mg Oral Q6H PRN Gavino Cooper MD       • ondansetron (ZOFRAN) injection 4 mg  4 mg Intravenous Q6H PRN Gavino Cooper MD       • docusate sodium (COLACE) capsule 100 mg  100 mg Oral BID PRN Gavino Cooper MD            IMPRESSION:    1.  Slid  down while taking a shower, no loss of consciousness.  Could not get up secondary to his previous CVA and left-sided weakness.  2.  History of CVA with left-sided weakness  3.  Elevated CPK secondary to fall, mildly elevated troponin I of no clinical significance significance in this setting.  Normal LV function on echo.  Ejection fraction 60%  4.  History of hypertension blood pressure has been adequate  5.  History of hyperlipidemia  6.  Presence of PFO as discussed above.    RECOMMENDATIONS:   1.  Continue current dose of Eliquis as prescribed by neurology following his stroke.  He is to follow-up with them.  Continue other medications.    Shantanu Diallo MD  2/21/2023   2 person assist

## 2023-10-17 NOTE — PROGRESS NOTE ADULT - ASSESSMENT
83 year old female with hx AF on VKA, PPM, dementia, HTN presenting with weakness, loose stools, near syncope with significant hypotension, sepsis, and ischemic colitis in setting of +UTI and COVID+.    Imp: Ischemic colitis, small bowel ileus in setting of infection/virus  normotensive, + bowel function with loose stools, Cdiff negative, GIPCR never collected with leukocytosis  ?infectious colitis    Rec:  ::GIPCR  ::Diet as aayush  ::Abx pr ID/primary team  ::Pan culture per primary team   83 year old female with hx AF on VKA, PPM, dementia, HTN presenting with weakness, loose stools, near syncope with significant hypotension, sepsis, and ischemic colitis in setting of +UTI and COVID+.    Imp: Ischemic colitis in the setting of syncope, viral infection; resolved    normotensive, + bowel function with loose stools, Cdiff negative, GIPCR never collected with leukocytosis  ?infectious colitis    Rec:  ::GIPCR  ::Diet as aayush  ::Abx pr ID/primary team  ::Pan culture per primary team

## 2023-10-18 ENCOUNTER — TRANSCRIPTION ENCOUNTER (OUTPATIENT)
Age: 83
End: 2023-10-18

## 2023-10-18 LAB
ANION GAP SERPL CALC-SCNC: 5 MMOL/L — SIGNIFICANT CHANGE UP (ref 5–17)
ANION GAP SERPL CALC-SCNC: 5 MMOL/L — SIGNIFICANT CHANGE UP (ref 5–17)
BASOPHILS # BLD AUTO: 0 K/UL — SIGNIFICANT CHANGE UP (ref 0–0.2)
BASOPHILS # BLD AUTO: 0 K/UL — SIGNIFICANT CHANGE UP (ref 0–0.2)
BASOPHILS NFR BLD AUTO: 0 % — SIGNIFICANT CHANGE UP (ref 0–2)
BASOPHILS NFR BLD AUTO: 0 % — SIGNIFICANT CHANGE UP (ref 0–2)
BUN SERPL-MCNC: 2 MG/DL — LOW (ref 7–23)
BUN SERPL-MCNC: 2 MG/DL — LOW (ref 7–23)
CALCIUM SERPL-MCNC: 7.7 MG/DL — LOW (ref 8.5–10.1)
CALCIUM SERPL-MCNC: 7.7 MG/DL — LOW (ref 8.5–10.1)
CHLORIDE SERPL-SCNC: 114 MMOL/L — HIGH (ref 96–108)
CHLORIDE SERPL-SCNC: 114 MMOL/L — HIGH (ref 96–108)
CO2 SERPL-SCNC: 18 MMOL/L — LOW (ref 22–31)
CO2 SERPL-SCNC: 18 MMOL/L — LOW (ref 22–31)
CREAT SERPL-MCNC: 0.41 MG/DL — LOW (ref 0.5–1.3)
CREAT SERPL-MCNC: 0.41 MG/DL — LOW (ref 0.5–1.3)
EGFR: 98 ML/MIN/1.73M2 — SIGNIFICANT CHANGE UP
EGFR: 98 ML/MIN/1.73M2 — SIGNIFICANT CHANGE UP
EOSINOPHIL # BLD AUTO: 0 K/UL — SIGNIFICANT CHANGE UP (ref 0–0.5)
EOSINOPHIL # BLD AUTO: 0 K/UL — SIGNIFICANT CHANGE UP (ref 0–0.5)
EOSINOPHIL NFR BLD AUTO: 0 % — SIGNIFICANT CHANGE UP (ref 0–6)
EOSINOPHIL NFR BLD AUTO: 0 % — SIGNIFICANT CHANGE UP (ref 0–6)
GI PCR PANEL: SIGNIFICANT CHANGE UP
GI PCR PANEL: SIGNIFICANT CHANGE UP
GLUCOSE SERPL-MCNC: 81 MG/DL — SIGNIFICANT CHANGE UP (ref 70–99)
GLUCOSE SERPL-MCNC: 81 MG/DL — SIGNIFICANT CHANGE UP (ref 70–99)
HCT VFR BLD CALC: 37.4 % — SIGNIFICANT CHANGE UP (ref 34.5–45)
HCT VFR BLD CALC: 37.4 % — SIGNIFICANT CHANGE UP (ref 34.5–45)
HGB BLD-MCNC: 12.8 G/DL — SIGNIFICANT CHANGE UP (ref 11.5–15.5)
HGB BLD-MCNC: 12.8 G/DL — SIGNIFICANT CHANGE UP (ref 11.5–15.5)
INR BLD: 2.25 RATIO — HIGH (ref 0.85–1.18)
INR BLD: 2.25 RATIO — HIGH (ref 0.85–1.18)
LYMPHOCYTES # BLD AUTO: 1.56 K/UL — SIGNIFICANT CHANGE UP (ref 1–3.3)
LYMPHOCYTES # BLD AUTO: 1.56 K/UL — SIGNIFICANT CHANGE UP (ref 1–3.3)
LYMPHOCYTES # BLD AUTO: 11 % — LOW (ref 13–44)
LYMPHOCYTES # BLD AUTO: 11 % — LOW (ref 13–44)
MANUAL SMEAR VERIFICATION: SIGNIFICANT CHANGE UP
MANUAL SMEAR VERIFICATION: SIGNIFICANT CHANGE UP
MCHC RBC-ENTMCNC: 31.2 PG — SIGNIFICANT CHANGE UP (ref 27–34)
MCHC RBC-ENTMCNC: 31.2 PG — SIGNIFICANT CHANGE UP (ref 27–34)
MCHC RBC-ENTMCNC: 34.2 GM/DL — SIGNIFICANT CHANGE UP (ref 32–36)
MCHC RBC-ENTMCNC: 34.2 GM/DL — SIGNIFICANT CHANGE UP (ref 32–36)
MCV RBC AUTO: 91.2 FL — SIGNIFICANT CHANGE UP (ref 80–100)
MCV RBC AUTO: 91.2 FL — SIGNIFICANT CHANGE UP (ref 80–100)
MONOCYTES # BLD AUTO: 0.28 K/UL — SIGNIFICANT CHANGE UP (ref 0–0.9)
MONOCYTES # BLD AUTO: 0.28 K/UL — SIGNIFICANT CHANGE UP (ref 0–0.9)
MONOCYTES NFR BLD AUTO: 2 % — SIGNIFICANT CHANGE UP (ref 2–14)
MONOCYTES NFR BLD AUTO: 2 % — SIGNIFICANT CHANGE UP (ref 2–14)
NEUTROPHILS # BLD AUTO: 12.35 K/UL — HIGH (ref 1.8–7.4)
NEUTROPHILS # BLD AUTO: 12.35 K/UL — HIGH (ref 1.8–7.4)
NEUTROPHILS NFR BLD AUTO: 87 % — HIGH (ref 43–77)
NEUTROPHILS NFR BLD AUTO: 87 % — HIGH (ref 43–77)
NRBC # BLD: 0 /100 — SIGNIFICANT CHANGE UP (ref 0–0)
NRBC # BLD: 0 /100 — SIGNIFICANT CHANGE UP (ref 0–0)
NRBC # BLD: SIGNIFICANT CHANGE UP /100 WBCS (ref 0–0)
NRBC # BLD: SIGNIFICANT CHANGE UP /100 WBCS (ref 0–0)
PLAT MORPH BLD: NORMAL — SIGNIFICANT CHANGE UP
PLAT MORPH BLD: NORMAL — SIGNIFICANT CHANGE UP
PLATELET # BLD AUTO: 299 K/UL — SIGNIFICANT CHANGE UP (ref 150–400)
PLATELET # BLD AUTO: 299 K/UL — SIGNIFICANT CHANGE UP (ref 150–400)
POTASSIUM SERPL-MCNC: 4.3 MMOL/L — SIGNIFICANT CHANGE UP (ref 3.5–5.3)
POTASSIUM SERPL-MCNC: 4.3 MMOL/L — SIGNIFICANT CHANGE UP (ref 3.5–5.3)
POTASSIUM SERPL-SCNC: 4.3 MMOL/L — SIGNIFICANT CHANGE UP (ref 3.5–5.3)
POTASSIUM SERPL-SCNC: 4.3 MMOL/L — SIGNIFICANT CHANGE UP (ref 3.5–5.3)
PROTHROM AB SERPL-ACNC: 24.8 SEC — HIGH (ref 9.5–13)
PROTHROM AB SERPL-ACNC: 24.8 SEC — HIGH (ref 9.5–13)
RBC # BLD: 4.1 M/UL — SIGNIFICANT CHANGE UP (ref 3.8–5.2)
RBC # BLD: 4.1 M/UL — SIGNIFICANT CHANGE UP (ref 3.8–5.2)
RBC # FLD: 13.9 % — SIGNIFICANT CHANGE UP (ref 10.3–14.5)
RBC # FLD: 13.9 % — SIGNIFICANT CHANGE UP (ref 10.3–14.5)
RBC BLD AUTO: NORMAL — SIGNIFICANT CHANGE UP
RBC BLD AUTO: NORMAL — SIGNIFICANT CHANGE UP
SODIUM SERPL-SCNC: 137 MMOL/L — SIGNIFICANT CHANGE UP (ref 135–145)
SODIUM SERPL-SCNC: 137 MMOL/L — SIGNIFICANT CHANGE UP (ref 135–145)
WBC # BLD: 14.2 K/UL — HIGH (ref 3.8–10.5)
WBC # BLD: 14.2 K/UL — HIGH (ref 3.8–10.5)
WBC # FLD AUTO: 14.2 K/UL — HIGH (ref 3.8–10.5)
WBC # FLD AUTO: 14.2 K/UL — HIGH (ref 3.8–10.5)

## 2023-10-18 PROCEDURE — 99233 SBSQ HOSP IP/OBS HIGH 50: CPT

## 2023-10-18 RX ORDER — WARFARIN SODIUM 2.5 MG/1
3 TABLET ORAL ONCE
Refills: 0 | Status: COMPLETED | OUTPATIENT
Start: 2023-10-18 | End: 2023-10-18

## 2023-10-18 RX ORDER — FUROSEMIDE 40 MG
40 TABLET ORAL ONCE
Refills: 0 | Status: COMPLETED | OUTPATIENT
Start: 2023-10-18 | End: 2023-10-18

## 2023-10-18 RX ORDER — NYSTATIN CREAM 100000 [USP'U]/G
1 CREAM TOPICAL
Refills: 0 | Status: DISCONTINUED | OUTPATIENT
Start: 2023-10-18 | End: 2023-10-23

## 2023-10-18 RX ADMIN — ATORVASTATIN CALCIUM 40 MILLIGRAM(S): 80 TABLET, FILM COATED ORAL at 21:44

## 2023-10-18 RX ADMIN — LISINOPRIL 5 MILLIGRAM(S): 2.5 TABLET ORAL at 09:36

## 2023-10-18 RX ADMIN — WARFARIN SODIUM 3 MILLIGRAM(S): 2.5 TABLET ORAL at 21:44

## 2023-10-18 RX ADMIN — CARVEDILOL PHOSPHATE 3.12 MILLIGRAM(S): 80 CAPSULE, EXTENDED RELEASE ORAL at 09:36

## 2023-10-18 RX ADMIN — QUETIAPINE FUMARATE 12.5 MILLIGRAM(S): 200 TABLET, FILM COATED ORAL at 21:45

## 2023-10-18 RX ADMIN — Medication 40 MILLIGRAM(S): at 14:05

## 2023-10-18 RX ADMIN — MEMANTINE HYDROCHLORIDE 10 MILLIGRAM(S): 10 TABLET ORAL at 21:45

## 2023-10-18 RX ADMIN — CARVEDILOL PHOSPHATE 3.12 MILLIGRAM(S): 80 CAPSULE, EXTENDED RELEASE ORAL at 21:44

## 2023-10-18 RX ADMIN — Medication 650 MILLIGRAM(S): at 21:46

## 2023-10-18 RX ADMIN — MEMANTINE HYDROCHLORIDE 10 MILLIGRAM(S): 10 TABLET ORAL at 09:36

## 2023-10-18 RX ADMIN — Medication 50 MICROGRAM(S): at 06:03

## 2023-10-18 RX ADMIN — DONEPEZIL HYDROCHLORIDE 10 MILLIGRAM(S): 10 TABLET, FILM COATED ORAL at 21:44

## 2023-10-18 NOTE — DISCHARGE NOTE PROVIDER - NSDCCPTREATMENT_GEN_ALL_CORE_FT
PRINCIPAL PROCEDURE  Procedure: CT abdomen  Findings and Treatment: No pulmonary embolism.  New moderate pleural effusions.  Interval decompression of the colon which is now mostly collapsed. No   pneumatosis seen.  Decreased small bowel dilatation.

## 2023-10-18 NOTE — CONSULT NOTE ADULT - ASSESSMENT
82 y/o female with PMHx of dementia, chronic AFIB on coumadin, s/p PPM, HTN, HLD, glaucoma, hypothyroid who presented to  with CC of weakness, diarrhea and near syncope at home. Patient admitted for ischemic colitis, UTI, septic shock and COVID.      PROBLEMS:    S/P Septic Shock - Sources of infection UTI / COVID-19 / ischemic colitis  COVID  SHANA:  resolved  HTN:    Hypothyroid:      PLAN:    C diff pcr negative-BCx negative   UCx with streptococcus, klebsiella S/P zosyn   Ischemic Colitis-surgical eval-- no plans for OR  chronic afib (on coumadin)   SHANA-resolved  COVID-SYMPTOMATIC RX  DVT Proph: coumadin  82 y/o female with PMHx of dementia, chronic AFIB on coumadin, s/p PPM, HTN, HLD, glaucoma, hypothyroid who presented to  with CC of weakness, diarrhea and near syncope at home. Patient admitted for ischemic colitis, UTI, septic shock and COVID.      PROBLEMS:    S/P Septic Shock - Sources of infection UTI / COVID-19 / ischemic colitis  COVID  New moderate pleural effusions with atelectasis  SHANA:  resolved  HTN:    Hypothyroid:      PLAN:    Consider thoro for pleural effusion-CT surgery eval.  C diff pcr negative-BCx negative   UCx with streptococcus, klebsiella S/P zosyn   Ischemic Colitis-surgical eval-- no plans for OR  chronic afib (on coumadin)   SHANA-resolved  COVID-SYMPTOMATIC RX  DVT Proph: coumadin

## 2023-10-18 NOTE — DISCHARGE NOTE PROVIDER - NSDCCPCAREPLAN_GEN_ALL_CORE_FT
PRINCIPAL DISCHARGE DIAGNOSIS  Diagnosis: Ischemic colitis  Assessment and Plan of Treatment: - patient is tolerating soft diet / will transition to solid foods.      SECONDARY DISCHARGE DIAGNOSES  Diagnosis: 2019 novel coronavirus disease (COVID-19)  Assessment and Plan of Treatment: - isolation with supportive care    Diagnosis: Septic shock  Assessment and Plan of Treatment: Septic Shock - Sources of infection UTI / COVID-19 / ischemic colitis.    - patient received full course of antibiotics  - C diff pcr negative   - BCx negative   - UCx with streptococcus, klebsiella - both sensitive to zosyn   - Suspect shock more related to UTI/ ischemic colitis.    - lactate improving    Diagnosis: Dementia  Assessment and Plan of Treatment: - supportive care    Diagnosis: Pleural effusion  Assessment and Plan of Treatment: - this is mostly multifactorial in the setting of severe malnutrition /  recent sepsis and fluid hydration  - will give dose of IV lasix  - supportive care    Diagnosis: SHANA (acute kidney injury)  Assessment and Plan of Treatment: - resolving     PRINCIPAL DISCHARGE DIAGNOSIS  Diagnosis: Ischemic colitis  Assessment and Plan of Treatment: - patient is tolerating diet      SECONDARY DISCHARGE DIAGNOSES  Diagnosis: 2019 novel coronavirus disease (COVID-19)  Assessment and Plan of Treatment: off isolation    Diagnosis: Septic shock  Assessment and Plan of Treatment: resolved   received IV fluids, BP meds were on hold now restarted   Due to   UTI / COVID-19 / ischemic colitis.    Completed Zosyn 7 days       Diagnosis: Dementia  Assessment and Plan of Treatment: - supportive care    Diagnosis: Pleural effusion  Assessment and Plan of Treatment: - this is mostly multifactorial in the setting of  CHF, severe malnutrition /  recent sepsis and fluid hydration  c/w lasix and resume spironolactone  F/u with Dr Tricia Stapleton       Diagnosis: SHANA (acute kidney injury)  Assessment and Plan of Treatment: resolved   Monitor renal Fx on diuretics, repeat labs in 2-3 days    Diagnosis: Coagulopathy  Assessment and Plan of Treatment: INR on admission >13, no bleeding   S/p Vit K   Pt restarte don coumadin   INR today 3.04  C/w Coumadin 2.5mg at bedtime, reperat INR on 10/24

## 2023-10-18 NOTE — DISCHARGE NOTE PROVIDER - PROVIDER TOKENS
PROVIDER:[TOKEN:[[id number]],FOLLOWUP:[1 month]],PROVIDER:[TOKEN:[[id number]],FOLLOWUP:[2 weeks]],PROVIDER:[TOKEN:[[id number]],FOLLOWUP:[1 month]]

## 2023-10-18 NOTE — CHART NOTE - NSCHARTNOTEFT_GEN_A_CORE
HUGO reached out to genegildardo Alondra ([phone]) to follow up and offer support. She acknowledges that pt went for CT scan & they are awaiting results. Plan remains the same -- SANDY placement. Alondra offer no additional questions. Emotional support provided. Our team will continue to follow.

## 2023-10-18 NOTE — DISCHARGE NOTE PROVIDER - CARE PROVIDER_API CALL
Wood Clarke  Pulmonary Disease  [address] [place] [address]  Phone: [phone]  Fax: [phone]  Follow Up Time: 1 month    Zita Sanabria  Cardiology  [address] [facility], [place] [address]  Phone: [phone]  Fax: [phone]  Follow Up Time: 2 weeks    Gregory Lópze  Internal Medicine  [address] [place] [address]  Phone: [phone]  Fax: [phone]  Follow Up Time: 1 month

## 2023-10-18 NOTE — DISCHARGE NOTE PROVIDER - NSDCMRMEDTOKEN_GEN_ALL_CORE_FT
atorvastatin 40 mg oral tablet: 1 tab(s) orally once a day  carvedilol 6.25 mg oral tablet: 0.5 tab(s) orally 2 times a day  digoxin 125 mcg (0.125 mg) oral tablet: 1 tab(s) orally every other day  donepezil 10 mg oral tablet: 1 tab(s) orally once a day (at bedtime)  furosemide 20 mg oral tablet: 1 tab(s) orally every other day  latanoprost 0.005% ophthalmic solution: 1 drop(s) in each eye once a day (in the evening)  lisinopril 5 mg oral tablet: 1 tab(s) orally once a day  memantine 10 mg oral tablet: 1 tab(s) orally 2 times a day  spironolactone 25 mg oral tablet: 1 tab(s) orally once a day  Synthroid 50 mcg (0.05 mg) oral tablet: 1 tab(s) orally once a day (in the morning)  Tylenol Extra Strength 500 mg oral tablet: 2 tab(s) orally 2 times a day as needed for hip pain  warfarin 4 mg oral tablet: 1 tab(s) orally x 2 days then 5 mg daily x 1 day ***pt on 3 day cycle 4mg, 4mg, 5mg***  warfarin 5 mg oral tablet: 1 tab(s) orally ***pt on 3 day cycle 4mg, 4mg, 5mg***   atorvastatin 40 mg oral tablet: 1 tab(s) orally once a day  carvedilol 3.125 mg oral tablet: 1 tab(s) orally every 12 hours  digoxin 125 mcg (0.125 mg) oral tablet: 1 tab(s) orally every other day  donepezil 10 mg oral tablet: 1 tab(s) orally once a day (at bedtime)  furosemide 20 mg oral tablet: 1 tab(s) orally every other day  latanoprost 0.005% ophthalmic solution: 1 drop(s) in each eye once a day (in the evening)  lisinopril 5 mg oral tablet: 1 tab(s) orally once a day  melatonin 3 mg oral tablet: 1 tab(s) orally once a day (at bedtime) As needed Insomnia  memantine 10 mg oral tablet: 1 tab(s) orally 2 times a day  Multiple Vitamins oral tablet: 1 tab(s) orally once a day  nystatin 100,000 units/g topical powder: 1 Apply topically to affected area 2 times a day  QUEtiapine 25 mg oral tablet: 0.5 tab(s) orally once a day (at bedtime) as needed for  agitation  spironolactone 25 mg oral tablet: 1 tab(s) orally once a day  Synthroid 50 mcg (0.05 mg) oral tablet: 1 tab(s) orally once a day (in the morning)  Tylenol Extra Strength 500 mg oral tablet: 2 tab(s) orally 2 times a day as needed for hip pain  warfarin 2.5 mg oral tablet: 1 tab(s) orally once a day (at bedtime) check INR on 10/23

## 2023-10-18 NOTE — DISCHARGE NOTE PROVIDER - HOSPITAL COURSE
Patient is a 84 y/o female with PMHx of dementia, chronic AFIB on coumadin, s/p PPM, HTN, HLD, glaucoma, hypothyroid who presented to  with CC of weakness, diarrhea and near syncope at home. Patient admitted for ischemic colitis, UTI, septic shock and COVID.            SHANA:  resolved  - Suspect all prerenal.    - Patient was on lasix at home.    - s/p 2.5 L in ER.    - Cr 1.78 -> 0.56    #COVID:    Patient with sx of weakness/ diarrhea but could also be explained by colitis/ UTI.    Trend.    No SOB/ cough/ fever.    Supportive care for now.    Repeat Covid test on the 15th negative  plan:  patient likely will require 5 days of iso. will eval tomorrow if iso can be removed    # Diarrhea:    - C diff negative    - May be related to ischemic colitis/ COVID.    plan:  check GI PCR  GI consulted ( recs appreciated)             Patient is a 84 y/o female with PMHx of dementia, chronic AFIB on coumadin, s/p PPM, HTN, HLD, glaucoma, hypothyroid who presented to  with CC of weakness, diarrhea and near syncope at home. Patient admitted for ischemic colitis, UTI, septic shock and COVID.      Patient is sick, frail, deconditioned, poor historian who is recovering from septic shock. Patient tolerating soft diet / completed course of antibiotics. WBC trending down.     Patient denies any HA, CP, SOB (but patient is poor historian). Patient is not on any O2. CT scan indicated fluid in the lung -  first dose of IV lasix given 10/18.     Physical Exam:   GENERAL APPEARANCE:  sick, deconditioned, frail  T(C): 36.4 (10-18-23 @ 10:36), Max: 37.2 (10-18-23 @ 08:14)  HR: 90 (10-18-23 @ 08:14) (83 - 90)  BP: 122/87 (10-18-23 @ 08:14) (122/80 - 134/91)  RR: 18 (10-18-23 @ 08:14) (18 - 18)  SpO2: 96% (10-18-23 @ 08:14) (93% - 96%)  HEENT:  temporal muscle wasting    Skin: thin and dry  NECK: no JVD  HEART:  Regular rate and rhythm   LUNGS:  Good ins/exp effort   ABDOMEN:  Soft, nontender, nondistended with good bowel sounds heard, some diarrhea as per family  EXTREMITIES:  1+ edema  NEUROLOGICAL:  Gross nonfocal, underlying dementia    Family updated. Care discussed with social and nursing.  Patient is pending [facility] to [facility].              84 y/o female with PMHx of dementia, chronic AFIB on coumadin, s/p PPM, HTN, HLD, glaucoma, hypothyroidism  who presented to  with CC of weakness, diarrhea.    As per family Pt  wasn't eating as much, having some diarrhea.  Pts  sister tried getting her up OOB to clean her up after having diarrhea and she became weak and had near syncope and they lowered her to the ground.  No fall/ trauma.    In the ER, patient found to have hypotension with initial SBP in 50's.  Elevated lactate 3.1.  Patient given 2.5 L NS.  CT concerning for ischemic colitis-- right colon.  Surgery was consulted who recommended conservative management.  Patient also was  tested positive for COVID--  but asymptomatic  other than weakness/ diarrhea.  Patient also found to have  positive UA suspected UTI.  Patient pancultured and started on cipro/ flagyl.  Also given 1 dose zosyn. Pts UCX + for Klebsiella and Streptococcus, Completed 7 days of Zosyn. Pts BP improved after aggressive IVF resuscitation. Hospital course notable for Moderate b/l pleural effusions. Likely due to IVF hydration. Pt was followed by Pulm and CT Sx, no thoracentesis was recommended, managed with Lasix  IV and then PO. CXR with improvement.  Pt also had elevated INR on admission, s/p Vit K. INR trended down, Pt restarted on Coumadin.   Today Pt awake and alert, baseline confused, oriented to self. Reports no pain, no difficulty breathing. Family at bedside, D.c planning and outPt f/u discussed     Vital Signs Last 24 Hrs  T(C): 36.9 (23 Oct 2023 08:54), Max: 37.5 (22 Oct 2023 20:35)  T(F): 98.4 (23 Oct 2023 08:54), Max: 99.5 (22 Oct 2023 20:35)  HR: 89 (23 Oct 2023 08:54) (78 - 93)  BP: 112/66 (23 Oct 2023 08:54) (102/61 - 136/79)  RR: 18 (23 Oct 2023 08:54) (18 - 18)  SpO2: 94% (23 Oct 2023 08:54) (94% - 99%)    REVIEW OF SYSTEMS:  Unable to obtain due to baseline confusion   PHYSICAL EXAM:  General: frail elderly female,  in no acute distress  Eyes: P, EOMI; conjunctiva and sclera clear  Head: Normocephalic; atraumatic  ENMT: No nasal discharge; airway clear  Respiratory:  Decreased BS at bases, No wheezes, rales or rhonchi  Cardiovascular: Irregular rate and rhythm. S1 and S2 Normal;   Gastrointestinal: Soft non-tender non-distended; Normal bowel sounds, + umbilical hernia   Genitourinary: No  suprapubic  tenderness  Extremities: No  edema  Neurological: Alert and oriented x1, confused   Skin: Warm and dry. No acute rash  Musculoskeletal: Normal muscle tone, without deformities  Psychiatric: Cooperative    84 y/o female with PMHx of dementia, chronic AFIB on coumadin, SSS s/p PPM,  non Obstructive CAD, HFrEF with restored EF, Non ischemic cardiomyopathy, HTN, HLD, glaucoma, hypothyroidism  admitted for:     # Septic Shock - Sources of infection UTI / COVID-19 / ischemic colitis.  Treated and resolved  - CT abd/pelvis as above  - completed zosyn 7days   - C diff pcr negative , BCx negative   - UCx with streptococcus, klebsiella - both sensitive to zosyn   - BP stable   - restarted home coreg + lisinopril   - Suspect shock more related to UTI/ ischemic colitis.    - lactate 3.1 -> 1.5 /  WBC trending down /  Na improved    # B/l Moderate pleural effusions, likely due to acute on Chronic HFrEF. In settings of  sepsis/IVF and malnutrition   as per outPt records reviewed Non ischemic Cardiomyopathy, non Obstructive CAD, restored  EF   On RA. S/p IV lasix 40mg x 1  Restarted on Lasix home dose 20mg QD, resume spironolactone at dc   C/w carvedilol and Lisinopril   CT sx consult appreciated; no intervention  Repeat CXR with improvement   F/u with Pulm Outpt     # Ischemic Colitis:  - Right colon on CT  - Appreciate surgical eval-- no plans for OR  - resolved    # Coagulopathy, supra therapeutic INR (POA). Chronic afib (on coumadin), SSS. S/p PPM   restarted coumadin on lower dose of 3 mg daily  INR today 3.04  C/w Coumdin 2.5mg QHS  repeat INR  in am     # SHANA:  resolved, likely prerenal and ATN due to sepsis  - s/p IVF , diuretics and lisinopril were on hold, now resumed   - Cr 1.78 -> 0.56  - Monitor renal Fx     #COVID +:    Patient with sx of weakness/ diarrhea but could also be explained by colitis/ UTI.    No SOB/ cough/ fever.    Repeat Covid test Neg    # Dementia:    Cont aricept/ namenda.     Seroquel PRN QHS      #HTN:    BP improved   Now tolerates lasix,  carvedilol, lisinopril , will resume spironolactone     #Hypothyroidism    - Cont synthroid.      #Advanced directives:    DNR/ DNI.  No pressors.  No central line.     Dispo; stable for dc to [facility], fax d/c summary to PCP  Total time 60 min      84 y/o female with PMHx of dementia, chronic AFIB on coumadin, s/p PPM, HTN, HLD, glaucoma, hypothyroidism  who presented to  with CC of weakness, diarrhea.    As per family Pt  wasn't eating as much, having some diarrhea.  Pts  sister tried getting her up OOB to clean her up after having diarrhea and she became weak and had near syncope and they lowered her to the ground.  No fall/ trauma.    In the ER, patient found to have hypotension with initial SBP in 50's.  Elevated lactate 3.1.  Patient given 2.5 L NS.  CT concerning for ischemic colitis-- right colon.  Surgery was consulted who recommended conservative management.  Patient also was  tested positive for COVID--  but asymptomatic  other than weakness/ diarrhea.  Patient also found to have  positive UA suspected UTI.  Patient pancultured and started on cipro/ flagyl.  Also given 1 dose zosyn. Pts UCX + for Klebsiella and Streptococcus, Completed 7 days of Zosyn. Pts BP improved after aggressive IVF resuscitation. Hospital course notable for Moderate b/l pleural effusions. Likely due to IVF hydration. Pt was followed by Pulm and CT Sx, no thoracentesis was recommended, managed with Lasix  IV and then PO. CXR with improvement.  Pt also had elevated INR on admission, s/p Vit K. INR trended down, Pt restarted on Coumadin.   Today Pt awake and alert, baseline confused, oriented to self. Reports no pain, no difficulty breathing. Family at bedside, D.c planning and outPt f/u discussed     Vital Signs Last 24 Hrs  T(C): 36.9 (23 Oct 2023 08:54), Max: 37.5 (22 Oct 2023 20:35)  T(F): 98.4 (23 Oct 2023 08:54), Max: 99.5 (22 Oct 2023 20:35)  HR: 89 (23 Oct 2023 08:54) (78 - 93)  BP: 112/66 (23 Oct 2023 08:54) (102/61 - 136/79)  RR: 18 (23 Oct 2023 08:54) (18 - 18)  SpO2: 94% (23 Oct 2023 08:54) (94% - 99%)    REVIEW OF SYSTEMS:  Unable to obtain due to baseline confusion   PHYSICAL EXAM:  General: frail elderly female,  in no acute distress  Eyes: P, EOMI; conjunctiva and sclera clear  Head: Normocephalic; atraumatic  ENMT: No nasal discharge; airway clear  Respiratory:  Decreased BS at bases, No wheezes, rales or rhonchi  Cardiovascular: Irregular rate and rhythm. S1 and S2 Normal;   Gastrointestinal: Soft non-tender non-distended; Normal bowel sounds, + umbilical hernia   Genitourinary: No  suprapubic  tenderness  Extremities: No  edema  Neurological: Alert and oriented x1, confused   Skin: Warm and dry. No acute rash  Musculoskeletal: Normal muscle tone, without deformities  Psychiatric: Cooperative    84 y/o female with PMHx of dementia, chronic AFIB on coumadin, SSS s/p PPM,  non Obstructive CAD, HFrEF with restored EF, Non ischemic cardiomyopathy, HTN, HLD, glaucoma, hypothyroidism  admitted for:     # Septic Shock - Sources of infection UTI / COVID-19 / ischemic colitis.  Treated and resolved  - CT abd/pelvis as above  - completed zosyn 7days   - C diff pcr negative , BCx negative   - UCx with streptococcus, klebsiella - both sensitive to zosyn   - BP stable   - restarted home coreg + lisinopril   - Suspect shock more related to UTI/ ischemic colitis.    - lactate 3.1 -> 1.5 /  WBC trending down /  Na improved    # B/l Moderate pleural effusions, likely due to acute on Chronic HFrEF. In settings of  sepsis/IVF and malnutrition   as per outPt records reviewed Non ischemic Cardiomyopathy, non Obstructive CAD, restored  EF   On RA. S/p IV lasix 40mg x 1  Restarted on Lasix home dose 20mg QD, resume spironolactone at dc   C/w carvedilol and Lisinopril   CT sx consult appreciated; no intervention  Repeat CXR with improvement   F/u with Pulm Outpt     # Ischemic Colitis:  - Right colon on CT  - Appreciate surgical eval-- no plans for OR  - resolved    # Coagulopathy, supra therapeutic INR (POA). Chronic afib (on coumadin), SSS. S/p PPM   restarted coumadin on lower dose of 3 mg daily  INR today 3.04  C/w Coumdin 2.5mg QHS  repeat INR  in am     # SHANA:  resolved, likely prerenal and ATN due to sepsis  - s/p IVF , diuretics and lisinopril were on hold, now resumed   - Cr 1.78 -> 0.56  - Monitor renal Fx     #COVID +:    Patient with sx of weakness/ diarrhea but could also be explained by colitis/ UTI.    No SOB/ cough/ fever.    Repeat Covid test Neg    # Dementia:    Cont aricept/ namenda.     Seroquel PRN QHS      #HTN:    BP improved   Now tolerates lasix,  carvedilol, lisinopril , will resume spironolactone     #Hypothyroidism    - Cont synthroid.      # Severe Protein Calorie manuduction   C/w supplements, MVI    #Advanced directives:    DNR/ DNI.  No pressors.  No central line.     Dispo; stable for dc to [facility], fax d/c summary to PCP  Total time 60 min

## 2023-10-18 NOTE — DISCHARGE NOTE PROVIDER - NSDCACTIVITY_GEN_ALL_CORE
Do not drive or operate machinery/Do not make important decisions as tolerated/Do not drive or operate machinery

## 2023-10-19 LAB
ALBUMIN SERPL ELPH-MCNC: 1.9 G/DL — LOW (ref 3.3–5)
ALBUMIN SERPL ELPH-MCNC: 1.9 G/DL — LOW (ref 3.3–5)
ALP SERPL-CCNC: 65 U/L — SIGNIFICANT CHANGE UP (ref 40–120)
ALP SERPL-CCNC: 65 U/L — SIGNIFICANT CHANGE UP (ref 40–120)
ALT FLD-CCNC: 22 U/L — SIGNIFICANT CHANGE UP (ref 12–78)
ALT FLD-CCNC: 22 U/L — SIGNIFICANT CHANGE UP (ref 12–78)
ANION GAP SERPL CALC-SCNC: 6 MMOL/L — SIGNIFICANT CHANGE UP (ref 5–17)
ANION GAP SERPL CALC-SCNC: 6 MMOL/L — SIGNIFICANT CHANGE UP (ref 5–17)
APTT BLD: 33.4 SEC — SIGNIFICANT CHANGE UP (ref 24.5–35.6)
APTT BLD: 33.4 SEC — SIGNIFICANT CHANGE UP (ref 24.5–35.6)
AST SERPL-CCNC: 15 U/L — SIGNIFICANT CHANGE UP (ref 15–37)
AST SERPL-CCNC: 15 U/L — SIGNIFICANT CHANGE UP (ref 15–37)
BILIRUB SERPL-MCNC: 0.5 MG/DL — SIGNIFICANT CHANGE UP (ref 0.2–1.2)
BILIRUB SERPL-MCNC: 0.5 MG/DL — SIGNIFICANT CHANGE UP (ref 0.2–1.2)
BUN SERPL-MCNC: 9 MG/DL — SIGNIFICANT CHANGE UP (ref 7–23)
BUN SERPL-MCNC: 9 MG/DL — SIGNIFICANT CHANGE UP (ref 7–23)
CALCIUM SERPL-MCNC: 8 MG/DL — LOW (ref 8.5–10.1)
CALCIUM SERPL-MCNC: 8 MG/DL — LOW (ref 8.5–10.1)
CHLORIDE SERPL-SCNC: 107 MMOL/L — SIGNIFICANT CHANGE UP (ref 96–108)
CHLORIDE SERPL-SCNC: 107 MMOL/L — SIGNIFICANT CHANGE UP (ref 96–108)
CO2 SERPL-SCNC: 27 MMOL/L — SIGNIFICANT CHANGE UP (ref 22–31)
CO2 SERPL-SCNC: 27 MMOL/L — SIGNIFICANT CHANGE UP (ref 22–31)
CREAT SERPL-MCNC: 0.52 MG/DL — SIGNIFICANT CHANGE UP (ref 0.5–1.3)
CREAT SERPL-MCNC: 0.52 MG/DL — SIGNIFICANT CHANGE UP (ref 0.5–1.3)
EGFR: 92 ML/MIN/1.73M2 — SIGNIFICANT CHANGE UP
EGFR: 92 ML/MIN/1.73M2 — SIGNIFICANT CHANGE UP
GLUCOSE SERPL-MCNC: 106 MG/DL — HIGH (ref 70–99)
GLUCOSE SERPL-MCNC: 106 MG/DL — HIGH (ref 70–99)
INR BLD: 3.12 RATIO — HIGH (ref 0.85–1.18)
INR BLD: 3.12 RATIO — HIGH (ref 0.85–1.18)
POTASSIUM SERPL-MCNC: 3.3 MMOL/L — LOW (ref 3.5–5.3)
POTASSIUM SERPL-MCNC: 3.3 MMOL/L — LOW (ref 3.5–5.3)
POTASSIUM SERPL-SCNC: 3.3 MMOL/L — LOW (ref 3.5–5.3)
POTASSIUM SERPL-SCNC: 3.3 MMOL/L — LOW (ref 3.5–5.3)
PROT SERPL-MCNC: 4.7 GM/DL — LOW (ref 6–8.3)
PROT SERPL-MCNC: 4.7 GM/DL — LOW (ref 6–8.3)
PROTHROM AB SERPL-ACNC: 34.1 SEC — HIGH (ref 9.5–13)
PROTHROM AB SERPL-ACNC: 34.1 SEC — HIGH (ref 9.5–13)
SODIUM SERPL-SCNC: 140 MMOL/L — SIGNIFICANT CHANGE UP (ref 135–145)
SODIUM SERPL-SCNC: 140 MMOL/L — SIGNIFICANT CHANGE UP (ref 135–145)

## 2023-10-19 PROCEDURE — 99232 SBSQ HOSP IP/OBS MODERATE 35: CPT

## 2023-10-19 PROCEDURE — 99233 SBSQ HOSP IP/OBS HIGH 50: CPT

## 2023-10-19 RX ADMIN — NYSTATIN CREAM 1 APPLICATION(S): 100000 CREAM TOPICAL at 17:14

## 2023-10-19 RX ADMIN — MEMANTINE HYDROCHLORIDE 10 MILLIGRAM(S): 10 TABLET ORAL at 22:55

## 2023-10-19 RX ADMIN — NYSTATIN CREAM 1 APPLICATION(S): 100000 CREAM TOPICAL at 07:49

## 2023-10-19 RX ADMIN — CARVEDILOL PHOSPHATE 3.12 MILLIGRAM(S): 80 CAPSULE, EXTENDED RELEASE ORAL at 09:33

## 2023-10-19 RX ADMIN — LISINOPRIL 5 MILLIGRAM(S): 2.5 TABLET ORAL at 09:33

## 2023-10-19 RX ADMIN — DONEPEZIL HYDROCHLORIDE 10 MILLIGRAM(S): 10 TABLET, FILM COATED ORAL at 22:55

## 2023-10-19 RX ADMIN — QUETIAPINE FUMARATE 12.5 MILLIGRAM(S): 200 TABLET, FILM COATED ORAL at 22:56

## 2023-10-19 RX ADMIN — MEMANTINE HYDROCHLORIDE 10 MILLIGRAM(S): 10 TABLET ORAL at 09:33

## 2023-10-19 RX ADMIN — CARVEDILOL PHOSPHATE 3.12 MILLIGRAM(S): 80 CAPSULE, EXTENDED RELEASE ORAL at 22:55

## 2023-10-19 RX ADMIN — ATORVASTATIN CALCIUM 40 MILLIGRAM(S): 80 TABLET, FILM COATED ORAL at 22:56

## 2023-10-19 RX ADMIN — LATANOPROST 1 DROP(S): 0.05 SOLUTION/ DROPS OPHTHALMIC; TOPICAL at 23:00

## 2023-10-19 RX ADMIN — Medication 50 MICROGRAM(S): at 06:47

## 2023-10-19 RX ADMIN — Medication 125 MICROGRAM(S): at 09:33

## 2023-10-19 NOTE — CONSULT NOTE ADULT - CONSULT REQUESTED DATE/TIME
10-Oct-2023 16:03
11-Oct-2023 13:41
11-Oct-2023 13:09
10-Oct-2023 13:45
19-Oct-2023 17:00
18-Oct-2023 18:20
10-Oct-2023 14:04
No

## 2023-10-19 NOTE — CONSULT NOTE ADULT - PROVIDER SPECIALTY LIST ADULT
Pulmonology
Colorectal Surgery
Gastroenterology
Infectious Disease
Thoracic Surgery
Critical Care
Palliative Care

## 2023-10-19 NOTE — PROGRESS NOTE ADULT - SUBJECTIVE AND OBJECTIVE BOX
HOSPITALIST ATTENDING PROGRESS NOTE    Chart and meds reviewed.      Subjective: Patient seen and examined. Resting comfortably. Discussed wit niece in room. Family would like patient to continue to work with PT. Patient in chair and sitting. Tolerating clear liquid diet well. WBC mildly lower today to 17.80. Continue to monitor. If worsening consider repeat imaging. Continue IV abx. repeat covid test, negative. Abdomen continues to be soft with positive bowel sounds. Left arm swollen, Upper extremity US ordered.     10/19: no complaints   AMS, poor historian  b/l pleural effusions on CT chest    PHYSICAL EXAM:    Daily     Daily     Vital Signs Last 24 Hrs  T(C): 36.6 (19 Oct 2023 08:23), Max: 36.9 (18 Oct 2023 21:38)  T(F): 97.9 (19 Oct 2023 08:23), Max: 98.5 (18 Oct 2023 21:38)  HR: 89 (19 Oct 2023 08:23) (78 - 94)  BP: 146/69 (19 Oct 2023 08:23) (127/81 - 146/69)  BP(mean): --  RR: 18 (19 Oct 2023 08:23) (18 - 19)  SpO2: 93% (19 Oct 2023 08:23) (93% - 98%)    Constitutional: Weak  appearing  HEENT: Atraumatic, VIVIEN, Normal, No congestion  Respiratory: Breath Sounds normal, no rhonchi/wheeze  Cardiovascular: N S1S2;   Gastrointestinal: Abdomen soft, non tender, Bowel Sounds present  Extremities: No edema, peripheral pulses present  Neurological: AAO x 3, no gross focal motor deficits  Skin: Non cellulitic, no rash, ulcers  Lymph Nodes: No lymphadenopathy noted  Back: No CVA tenderness   Musculoskeletal: non tender  Breasts: Deferred  Genitourinary: deferred  Rectal: Deferred          All Labs/EKG/Radiology/Meds reviewed    Lab Results:  CBC  CBC Full  -  ( 18 Oct 2023 09:27 )  WBC Count : 14.20 K/uL  RBC Count : 4.10 M/uL  Hemoglobin : 12.8 g/dL  Hematocrit : 37.4 %  Platelet Count - Automated : 299 K/uL  Mean Cell Volume : 91.2 fl  Mean Cell Hemoglobin : 31.2 pg  Mean Cell Hemoglobin Concentration : 34.2 gm/dL  Auto Neutrophil # : 12.35 K/uL  Auto Lymphocyte # : 1.56 K/uL  Auto Monocyte # : 0.28 K/uL  Auto Eosinophil # : 0.00 K/uL  Auto Basophil # : 0.00 K/uL  Auto Neutrophil % : 87.0 %  Auto Lymphocyte % : 11.0 %  Auto Monocyte % : 2.0 %  Auto Eosinophil % : 0.0 %  Auto Basophil % : 0.0 %    .		Differential:	[] Automated		[] Manual  Chemistry  10-18    137  |  114<H>  |  2<L>  ----------------------------<  81  4.3   |  18<L>  |  0.41<L>    Ca    7.7<L>      18 Oct 2023 08:32        PT/INR - ( 18 Oct 2023 09:27 )   PT: 24.8 sec;   INR: 2.25 ratio           Urinalysis Basic - ( 18 Oct 2023 08:32 )    Color: x / Appearance: x / SG: x / pH: x  Gluc: 81 mg/dL / Ketone: x  / Bili: x / Urobili: x   Blood: x / Protein: x / Nitrite: x   Leuk Esterase: x / RBC: x / WBC x   Sq Epi: x / Non Sq Epi: x / Bacteria: x        MICROBIOLOGY/CULTURES:          All other systems reviewed and found to be negative with the exception of what has been described above.    MEDICATIONS  (STANDING):  atorvastatin 40 milliGRAM(s) Oral at bedtime  carvedilol 3.125 milliGRAM(s) Oral every 12 hours  digoxin     Tablet 125 MICROGram(s) Oral every other day  donepezil 10 milliGRAM(s) Oral at bedtime  influenza  Vaccine (HIGH DOSE) 0.7 milliLiter(s) IntraMuscular once  latanoprost 0.005% Ophthalmic Solution 1 Drop(s) Both EYES at bedtime  levothyroxine 50 MICROGram(s) Oral daily  lisinopril 5 milliGRAM(s) Oral daily  memantine 10 milliGRAM(s) Oral two times a day  piperacillin/tazobactam IVPB.. 3.375 Gram(s) IV Intermittent every 8 hours  QUEtiapine 12.5 milliGRAM(s) Oral at bedtime  sodium chloride 0.9% Bolus 1000 milliLiter(s) IV Bolus once  sodium chloride 0.9%. 1000 milliLiter(s) (100 mL/Hr) IV Continuous <Continuous>    MEDICATIONS  (PRN):  acetaminophen     Tablet .. 650 milliGRAM(s) Oral every 6 hours PRN Temp greater or equal to 38C (100.4F), Mild Pain (1 - 3)  aluminum hydroxide/magnesium hydroxide/simethicone Suspension 30 milliLiter(s) Oral every 4 hours PRN Dyspepsia  melatonin 3 milliGRAM(s) Oral at bedtime PRN Insomnia  ondansetron Injectable 4 milliGRAM(s) IV Push every 8 hours PRN Nausea and/or Vomiting  QUEtiapine 12.5 milliGRAM(s) Oral at bedtime PRN agitation

## 2023-10-19 NOTE — CONSULT NOTE ADULT - SUBJECTIVE AND OBJECTIVE BOX
84 y/o female with PMHx of  HTN, hypothyroidism, HF, glaucoma and a-fib s/p PPM presents to the ED with generalized weakness. Per EMS pt was at home and had near syncopal episode, pt did not fall to ground, EMS also reports pt is confused at baseline. Pt hypotensive upon arrival to the ED and evaluated in trauma bay.  As per family the pt was having diarrhea for several day and this cause elevation of INR for which she also came to the ED.   The pt is AAO x 1, poor historian does not provide hx, look comfortable not in pain.     PAST MEDICAL & SURGICAL HISTORY:  Chronic atrial fibrillation  Hypertension, unspecified type  Hyperlipidemia, unspecified hyperlipidemia type  Glaucoma of both eyes, unspecified glaucoma type  Chronic midline low back pain without sciatic  Hypothyroidism, unspecified type  Obesity, unspecified classification, unspecified obesity type, unspecified whether serious comorbidity present    S/P dilatation and curettage  1970s  Artificial cardiac pacemaker  2008    Vitals:  T(C): 37.1 (10-10 @ 11:05), Max: 37.1 (10-10 @ 11:05)  HR: 92 (10-10 @ 13:57) (46 - 94)  BP: 110/69 (10-10 @ 13:57) (55/40 - 110/69)  RR: 21 (10-10 @ 13:57) (17 - 26)  SpO2: 98% (10-10 @ 13:57) (92% - 100%)      Physical Exam:  General: AAOx1, NAD  Chest: Normal respiratory effort  Abdomen: Soft, distended, mildly tender, reducible umbilical hernia with mild discoloration of the umbilicus, tympanic, no peritoneal signs elicited  Neuro/Psych: flat affect  Skin: Normal, no rashes, no lesions noted.     10-10 @ 11:03                    12.3  CBC: 15.24>)-------(<351                     35.5                 127 | 96 | 60    CMP:  ----------------------< 142               4.1 | 19 | 1.78                      Ca:8.7  Phos:-  Mg:-               0.8|      |37        LFTs:  ------|82|-----             -|      |-      Current Inpatient Medications:  ciprofloxacin   IVPB 400 milliGRAM(s) IV Intermittent once  metroNIDAZOLE  IVPB 500 milliGRAM(s) IV Intermittent once    < from: CT Abdomen and Pelvis w/ IV Cont (10.10.23 @ 12:13) >  1.  Fluid-filled ascending colon pneumatosis, suspicious for acute   ischemic colitis.  2.  Diffusely distended small bowel loops likely representing a reactive   ileus. No transition point to suggest a small bowel obstruction.  3.  Small volume ascites, possibly reactive.    < end of copied text >      
Surgeon: Luis    Consult requesting by: Donavon     HISTORY OF PRESENT ILLNESS:  82 y/o female with PMHx of dementia, chronic AFIB on coumadin, s/p PPM, HTN, HLD, glaucoma, hypothyroid who presented to  with CC of weakness, diarrhea.  History is obtained from patient's family.  Patient lives at home with her sister-- Kathy.  Kathy was out of town last few days but as per family, patient has been declining.  She wasn't eating as much, having some diarrhea.  She had her INR checked which was elevated last week and her coumadin dosing was adjusted.  Today, her sister tried getting her up OOB to clean her up after having diarrhea and she came weak and had near syncope and they lowered her to the ground.  No fall/ trauma.  They brought her to the Er for further evaluation.  In the ER, leonarda found to have hypotension with inital SBP in 50's.  Elevated lactate 3.1.  Patient given 2.5 L NSS.  CT concerning for ischemic colitis-- right colon.  Surgery was consulted who recommended conservative management for now and close f/u exams.  Patient denies abd pain.  Patient also tested positive for COVID-- sister denies sx other than weakness/ diarrhea.  Patient also found to have UTI with postiive UA.  Patient pancultured and started on cipro/ flagyl.  Also given 1 dose zosyn.  Patient seen by ICU with septic shock-- stated okay for floors.      Pt seen at bedside.  Confused.  On RA     PAST MEDICAL & SURGICAL HISTORY:  Chronic atrial fibrillation      Hypertension, unspecified type      Hyperlipidemia, unspecified hyperlipidemia type      Pacemaker at end of battery life      Glaucoma of both eyes, unspecified glaucoma type      Chronic midline low back pain without sciatica      Hypothyroidism, unspecified type      Obesity, unspecified classification, unspecified obesity type, unspecified whether serious comorbidity present      S/P dilatation and curettage  1970s      Artificial cardiac pacemaker  2008          MEDICATIONS  (STANDING):  atorvastatin 40 milliGRAM(s) Oral at bedtime  carvedilol 3.125 milliGRAM(s) Oral every 12 hours  digoxin     Tablet 125 MICROGram(s) Oral every other day  donepezil 10 milliGRAM(s) Oral at bedtime  influenza  Vaccine (HIGH DOSE) 0.7 milliLiter(s) IntraMuscular once  latanoprost 0.005% Ophthalmic Solution 1 Drop(s) Both EYES at bedtime  levothyroxine 50 MICROGram(s) Oral daily  lisinopril 5 milliGRAM(s) Oral daily  memantine 10 milliGRAM(s) Oral two times a day  nystatin Powder 1 Application(s) Topical two times a day  QUEtiapine 12.5 milliGRAM(s) Oral at bedtime    MEDICATIONS  (PRN):  acetaminophen     Tablet .. 650 milliGRAM(s) Oral every 6 hours PRN Temp greater or equal to 38C (100.4F), Mild Pain (1 - 3)  aluminum hydroxide/magnesium hydroxide/simethicone Suspension 30 milliLiter(s) Oral every 4 hours PRN Dyspepsia  melatonin 3 milliGRAM(s) Oral at bedtime PRN Insomnia  ondansetron Injectable 4 milliGRAM(s) IV Push every 8 hours PRN Nausea and/or Vomiting  QUEtiapine 12.5 milliGRAM(s) Oral at bedtime PRN agitation    Antiplatelet therapy:  Coumadin                           Last dose/amt: Current     Allergies    No Known Allergies    Intolerances        SOCIAL HISTORY: unable to interview - confused   Smoker: [ ] Yes  [ ] No        PACK YEARS:                         WHEN QUIT?  ETOH use: [ ] Yes  [ ] No              FREQUENCY / QUANTITY:  Ilicit Drug use:  [ ] Yes  [ ] No  Occupation:  Live with:  Assisted device use:    FAMILY HISTORY:  No pertinent family history in first degree relatives        Review of Systems   unable to interview - confused   CONSTITUTIONAL:  Fevers[ ] chills[ ] sweats[ ] fatigue[ ] weight loss[ ] weight gain [ ]                                     NEGATIVE [ ]   NEURO:  parathesias[ ] seizures [ ]  syncope [ ]  confusion [ ]                                                                                NEGATIVE[ ]   EYES: glasses[ ]  blurry vision[ ]  discharge[ ] pain[ ] glaucoma [ ]                                                                          NEGATIVE[ ]   ENMT:  difficulty hearing [ ]  vertigo[ ]  dysphagia[ ] epistaxis[ ] recent dental work [ ]                                    NEGATIVE[ ]   CV:  chest pain[ ] palpitations[ ] LEMA [ ] diaphoresis [ ]                                                                                           NEGATIVE[ ]   RESPIRATORY:  wheezing[ ] SOB[ ] cough [ ] sputum[ ] hemoptysis[ ]                                                                  NEGATIVE[ ]   GI:  nausea[ ]  vommiting [ ]  diarrhea[ ] constipation [ ] melena [ ]                                                                      NEGATIVE[ ]   : hematuria[ ]  dysuria[ ] urgency[ ] incontinence[ ]                                                                                            NEGATIVE[ ]   MUSKULOSKELETAL:  arthritis[ ]  joint swelling [ ] muscle weakness [ ]                                                                NEGATIVE[ ]   SKIN/BREAST:  rash[ ] itching [ ]  hair loss[ ] masses[ ]                                                                                              NEGATIVE[ ]   PSYCH:  dementia [ ] depresion [ ] anxiety[ ]                                                                                                               NEGATIVE[ ]   HEME/LYMPH:  bruises easily[ ] enlarged lymph nodes[ ] tender lymph nodes[ ]                                               NEGATIVE[ ]   ENDOCRINE:  cold intolerance[ ] heat intolerance[ ] polydipsia[ ]                                                                          NEGATIVE[ ]     PHYSICAL EXAM  Vital Signs Last 24 Hrs  T(C): 36.5 (19 Oct 2023 15:25), Max: 36.9 (18 Oct 2023 21:38)  T(F): 97.7 (19 Oct 2023 15:25), Max: 98.5 (18 Oct 2023 21:38)  HR: 77 (19 Oct 2023 15:25) (77 - 89)  BP: 121/52 (19 Oct 2023 15:25) (121/52 - 146/69)  BP(mean): --  RR: 18 (19 Oct 2023 15:25) (18 - 19)  SpO2: 99% (19 Oct 2023 15:25) (93% - 99%)    Parameters below as of 19 Oct 2023 15:25  Patient On (Oxygen Delivery Method): room air        CONSTITUTIONAL:                                                                          WNL[ ]  confused   Neuro: WNL[ ] Normal exam oriented to person/place & time with no focal motor or sensory  deficits. Other                     Eyes: WNL[x ]   Normal exam of conjunctiva & lids, pupils equally reactive. Other     ENT: WNL[x ]    Normal exam of nasal/oral mucosa with absence of cyanosis. Other  Neck: WNL[ ]  Normal exam of jugular veins, trachea & thyroid. Other  Chest: WNL[ ] Normal lung exam with good air movement absence of wheezes, rales, or rhonchi: Other clear, decreased                                                                                 CV:  Auscultation: normal [x ] S3[ ] S4[ ] Irregular [ ] Rub[ ] Clicks[ ]    Murmurs none:[ ]systolic [x ]  diastolic [ ] holosystolic [ ]  Carotids: No Bruits[x ] Other                 Abdominal Aorta: normal [ ] nonpalpable[ ]Other                                                                                      GI:           WNL[x ] Normal exam of abdomen, liver & spleen with no noted masses or tenderness. Other                                                                                                        Extremities: WNL[ ] Normal no evidence of cyanosis or deformity Edema: none[ ]trace[x ]1+[ ]2+[ ]3+[ ]4+[ ]    LABS:                        12.8   14.20 )-----------( 299      ( 18 Oct 2023 09:27 )             37.4     10-18    137  |  114<H>  |  2<L>  ----------------------------<  81  4.3   |  18<L>  |  0.41<L>    Ca    7.7<L>      18 Oct 2023 08:32      PT/INR - ( 18 Oct 2023 09:27 )   PT: 24.8 sec;   INR: 2.25 ratio           Urinalysis Basic - ( 18 Oct 2023 08:32 )    Color: x / Appearance: x / SG: x / pH: x  Gluc: 81 mg/dL / Ketone: x  / Bili: x / Urobili: x   Blood: x / Protein: x / Nitrite: x   Leuk Esterase: x / RBC: x / WBC x   Sq Epi: x / Non Sq Epi: x / Bacteria: x            < from: CT Angio Chest PE Protocol w/ IV Cont (10.17.23 @ 14:29) >  HEART/VASCULATURE: No pulmonary embolism. Dilated left atrium. Initial   nonenhancement of the left atrial appendage tip which fills in on the   venous phase, compatible with slow flow. No pericardial effusion.   Dual-chamber cardiac device. Normal caliber aorta.    LUNGS/AIRWAYS/PLEURA: No endobronchial lesion. New moderate pleural   effusions and associated passive atelectasis of the lower lobes.    LYMPH NODES/MEDIASTINUM: Multinodular thyroid. No lymphadenopathy.    BONES/SOFT TISSUES: Degenerative changes of the spine.        CT ABDOMEN/PELVIS:    LIVER: Multiple hepatic cysts.    BILIARY SYSTEM: Cholelithiasis. Decompressed gallbladder.    SPLEEN:Unremarkable.    PANCREAS: Unremarkable.    ADRENALS: Unremarkable.    KIDNEYS/URINARY TRACT: Symmetric size and enhancement of both kidneys. No   hydronephrosis. Decreased air within the bladder.    GASTROINTESTINAL TRACT: Interval decompression of the colon, now mostly   collapsed, and limiting assessment for wall thickening. Decreased   multiple dilated fluid-filled small bowel loops in the pelvis without   transition point. Short segment of bowel within an umbilical hernia.   Diverticulosis.    REPRODUCTIVE ORGANS: Calcified fibroid.    LYMPH NODES/PERITONEUM: Similar small volume ascites. No free air. No   lymphadenopathy.    VASCULATURE: Normal caliber aorta. Patent portal veins.    BONES/SOFT TISSUES: Anasarca. Degenerative changes of the spine.      IMPRESSION:    No pulmonary embolism.    New moderate pleural effusions.    Interval decompression of the colon which is now mostly collapsed. No   pneumatosis seen.    Decreased small bowel dilatation.    < end of copied text >                                  
HPI: Pt is a 83y old Female with hx of chronic a-fib (on warfarin), PPM (? at end of life per chart), HTN, HLD, umbilical hernia (sister says she was not a surgical candidate to have that fixed) and dementia. Pt with poor appetite and diarrhea x a few days. Today was weak and unsteady, no fall/helped to floor by sister. Pt unable to contribute to history or ROS, says she feels fine. Consulted by ED d/t hypotension (SBP 50s on arrival but improved to  after 2.5L IVF resuscitation), UTI and ischemic colitis as seen on CT.  ED interventions: NS 2.5L IVF, cipro, flagyl , zosyn, vitamin K 5mg PO x 1. Surgical consult. Palliative medicine consulted to help establish GOC and advance care planning     10/10/2023 pt seen and examined with niece at bedside, patient pleasantly confused and appears comfortable as per niece at bedside patient mentation is at baseline  Will reach out to family to schedule GOC meeting       PAIN: ( )Yes   (x )No  denies pain     DYSPNEA: ( ) Yes  (x ) No  Level:    PAST MEDICAL & SURGICAL HISTORY:  Chronic atrial fibrillation  Hypertension, unspecified type  Hyperlipidemia, unspecified hyperlipidemia type  Pacemaker at end of battery life  Glaucoma of both eyes, unspecified glaucoma type  Chronic midline low back pain without sciatica  Hypothyroidism, unspecified type  Obesity, unspecified classification, unspecified obesity type, unspecified whether serious comorbidity present  S/P dilatation and curettage 1970s  Artificial cardiac pacemaker 2008    SOCIAL HX: lives in a home with her sister and brother in law     Hx opiate tolerance ( )YES  ( x)NO    Baseline ADLs  (Prior to Admission)  (x ) Independent   ( )Dependent  with some assistance     FAMILY HISTORY:  No pertinent family history in first degree relatives    Review of Systems:  Unable to obtain/Limited due to: Dementia     PHYSICAL EXAM:    Vital Signs Last 24 Hrs  T(C): 36.8 (10 Oct 2023 15:40), Max: 37.1 (10 Oct 2023 11:05)  T(F): 98.3 (10 Oct 2023 15:40), Max: 98.8 (10 Oct 2023 11:05)  HR: 92 (10 Oct 2023 13:57) (46 - 94)  BP: 106/94 (10 Oct 2023 15:40) (55/40 - 110/69)  BP(mean): 100 (10 Oct 2023 15:40) (61 - 100)  RR: 22 (10 Oct 2023 15:40) (17 - 26)  SpO2: 99% (10 Oct 2023 15:40) (92% - 100%)    Parameters below as of 10 Oct 2023 13:57  Patient On (Oxygen Delivery Method): room air    Daily Height in cm: 162.56 (10 Oct 2023 10:50)      PPSV2: 30  %  FAST: unsure of baseline     General: pleasantly confused female in bed, NAD   Mental Status: alert and confused   HEENT: nasal cannula in place   Lungs: diminished b/l   Cardiac: s1s2 +   GI: nontender, nondistended, +BS   : +voiding   Ext: mild edema   Neuro: dementia       LABS:                        12.3   15.24 )-----------( 351      ( 10 Oct 2023 11:03 )             35.5     10-10    127<L>  |  96  |  60<H>  ----------------------------<  142<H>  4.1   |  19<L>  |  1.78<H>    Ca    8.7      10 Oct 2023 11:03    TPro  5.7<L>  /  Alb  2.6<L>  /  TBili  0.8  /  DBili  x   /  AST  37  /  ALT  36  /  AlkPhos  82  10-10    PT/INR - ( 10 Oct 2023 11:03 )   PT: 136.8 sec;   INR: 13.05 ratio         PTT - ( 10 Oct 2023 11:03 )  PTT:45.6 sec  Albumin: Albumin: 2.6 g/dL (10-10 @ 11:03)      Allergies    No Known Allergies    Intolerances      MEDICATIONS  (STANDING):    MEDICATIONS  (PRN):      RADIOLOGY/ADDITIONAL STUDIES:    < from: CT Abdomen and Pelvis w/ IV Cont (10.10.23 @ 12:13) >    ACC: [id number] EXAM:  CT ABDOMEN AND PELVIS IC   ORDERED BY: CALIXTO THOMPSON     ACC: [id number] EXAM:  CT CHEST IC   ORDERED BY: CALIXTO THOMPSON     PROCEDURE DATE:  10/10/2023          INTERPRETATION:  CLINICAL INFORMATION: Hypotension, left-sidedabdominal   pain    COMPARISON: None.    CONTRAST/COMPLICATIONS:  IV Contrast: Omnipaque 350 (accession [id number]), IV contrast documented   in unlinked concurrent exam (accession [id number])  90 cc administered   10   cc discarded  Oral Contrast: NONE  Complications: None reported at time of study completion    PROCEDURE:  CT of the Chest, Abdomen and Pelvis was performed.  Sagittal and coronal reformats were performed.    FINDINGS:  CHEST:  LUNGS AND LARGE AIRWAYS: Patent central airways. No consolidation.  PLEURA: Trace right pleural effusion.  VESSELS: Normal caliber thoracic aorta. Slightly dilated main pulmonary   artery.  HEART: Cardiomegaly with biatrial enlargement. No pericardial effusion.   Coronary artery calcifications.  MEDIASTINUMAND JC: No lymphadenopathy.  CHEST WALL AND LOWER NECK: Left chest wall pacemaker with the right   atrium and right ventricle. Heterogeneous and enlarged right thyroid   lobe. Irregular hypoattenuating lesion posterior to the left thyroid   lobe, possibly representing a thyroid nodule or parathyroid lesion   measuring 1.2 x 0.8 cm (2; 5).    ABDOMEN AND PELVIS:  LIVER: Scattered hypoattenuating liver lesions, incompletely   characterized, likely benign cysts and/or hemangiomas.  BILE DUCTS: Normal caliber.  GALLBLADDER: Within normal limits.  SPLEEN: Within normal limits.  PANCREAS: Within normal limits.  ADRENALS: Within normal limits.  KIDNEYS/URETERS: Within normal limits.    BLADDER: Within normal limits.  REPRODUCTIVE ORGANS: Fibroid uterus.    BOWEL: Distended and fluid-filled ascending colon with pneumatosis,   suspicious for ischemic colitis. Diffusely related to fluid-filled loops   of small bowel extending to the level of ileocecal valve, likely   representing reactive ileus. Appendix is normal.  PERITONEUM: Small volume ascites  VESSELS: Normal caliber abdominal aorta with mild scattered calcified   atherosclerotic plaque. The celiac artery, superior mesenteric artery,   and inferior mesenteric artery are grossly patent. The superior   mesenteric vein, portal vein, and splenic vein are grossly patent.  RETROPERITONEUM/LYMPH NODES: No lymphadenopathy.  ABDOMINAL WALL: Moderate anasarca. Moderate fat-containing umbilical   hernia with ascites fluid within the hernia sac.  BONES: Severe degenerative changes of bilateral hips. Degenerative   changes throughout the thoracolumbar spine.    IMPRESSION:  1.  Fluid-filled ascending colon pneumatosis, suspicious for acute   ischemic colitis.  2.  Diffusely distended small bowel loops likely representing a reactive   ileus. No transition point to suggest a small bowel obstruction.  3.  Small volume ascites, possibly reactive.    Findings of ischemic colitis were discussed with Dr. CALIXTO THOMPSON   10/10/2023 12:59 PM Mauricio Neal with read back confirmation.    --- End of Report ---    < end of copied text >    < from: CT Cervical Spine No Cont (10.10.23 @ 12:10) >    ACC: [id number] EXAM:  CT CERVICAL SPINE   ORDERED BY: CALIXTO THOMPSON     ACC: [id number] EXAM:  CT BRAIN   ORDERED BY: CALIXTO THOMPSON     PROCEDURE DATE:  10/10/2023          INTERPRETATION:  .    CLINICAL INFORMATION: Altered mental status. Hypotension. Trauma.    TECHNIQUE: Transaxial CT images were obtained through the cervical spine   and head without the administration of IV contrast. Sagittal and coronal   reformatted images were obtained from the source data.    COMPARISON: None available.    FINDINGS:    NONCONTRAST CERVICAL SPINE CT: No acute fractures or dislocations are   notable. Grade 1 anterolisthesis of C4 on C5 and C5 on C6 are notable. A   minor rightward convex curvature to the cervical spine is seen. There is   no loss of vertebral body height. Scattered degenerative lucencies and   areas of sclerosis are notable throughout the vertebral bodies and   facets. Multilevel degenerative disc disease is noted, worst at the C5-C6   and C6-C7 levels with reactive endplate changes. Multilevel facet   arthrosis is notable. The dens is intact. The lateral masses of C1 are   not displaced. There is no prevertebral soft tissue swelling.    Evaluation of the intraspinal contents is limited on CT modality. There   are variable degrees of multilevel canal and foraminal stenosis secondary   to endplate degenerative changes and facet arthrosis.    Arterial vascular calcifications are seen. The imaged lung bases are   clear.    A heterogeneously enlarged right-sided thyroid lobe is seen which   contains nodule which measures up to 2 cm.    There is a cardiac pacemaker overlying the left chest wall.    NONCONTRAST HEAD CT: There is no acute intracranial hemorrhage, mass   effect, shift of the midline structures, herniation, extra-axial fluid   collection, or hydrocephalus.    There is diffuse cerebral volume loss with prominence of the sulci,   fissures, and cisternal spaces which is normal for the patient's age.   There is mild deep and periventricular white matter hypoattenuation   statistically compatible with microvascular changes given calcific   atherosclerotic disease of the intracranial arteries.    The paranasal sinuses and tympanomastoid cavities are clear. The   calvarium is intact. The imaged orbits are unremarkable.    IMPRESSION:    Cervical spine CT: No acute fractures or dislocations.    Multilevel cervical spondylosis.    2 cm right-sided thyroid nodule. Recommend ultrasound correlation as   thyroid neoplasm cannot be excluded.    Head CT: No acuteintracranial hemorrhage, mass effect, or shift of the   midline structures.    Mild chronic white matter microvascular type changes.    < from: Xray Chest 1 View-PORTABLE IMMEDIATE (10.10.23 @ 11:52) >    ACC: [id number] EXAM:  XR CHEST PORTABLE IMMED 1V   ORDERED BY: CALIXTO THOMPSON     PROCEDURE DATE:  10/10/2023          INTERPRETATION:  AP chest on October 10, 2023 at 10:36 AM. Patient has   sepsis.    Heart magnified by technique and is possibly enlarged.    Left-sided pacemaker again noted.    Lungs remain clear.    Present film shows slight left tracheal deviation above the thoracic   inlet suggesting right lobe thyroid enlargement. This is new since   October 12, 2017.    IMPRESSION: Probable heart enlargement and left-sided pacemaker again   noted. No infiltrate.    Present film shows slight left tracheal deviation above the thoracic   inlet suggesting right lobe thyroid enlargement. This is new.    --- End of Report ---    < end of copied text >    
  HPI:    83 y.o.f with PMHx of dementia, chronic AFIB on coumadin, s/p PPM, HTN, HLD, glaucoma, hypothyroid who admitted to  with CC of weakness, diarrhea.  Patient lives at home with her sister-- Kathy.  Kathy was out of town last few days but as per family, patient has been declining.  She wasn't eating as much, having some diarrhea.  She had her INR checked which was elevated last week and her coumadin dosing was adjusted.  Today, her sister tried getting her up OOB to clean her up after having diarrhea and she came weak and had near syncope and they lowered her to the ground.  No fall/ trauma.  They brought her to the Er for further evaluation.  In the ER, leonarda found to have hypotension with inital SBP in 50's.  Elevated lactate 3.1.  Patient given 2.5 L NSS.  CT concerning for ischemic colitis-- right colon.  Surgery was consulted who recommended conservative management for now and close f/u exams.  Patient denies abd pain.  Patient also tested positive for COVID-- sister denies sx other than weakness/ diarrhea.  Patient also found to have UTI with postiive UA.  Patient pancultured and started on cipro/ flagyl.  Also given 1 dose zosyn.  Patient seen by ICU with septic shock, pat rx on floor, seen for pulmonary for possible discharge in am.       PAST MEDICAL & SURGICAL HISTORY:  Chronic atrial fibrillation  Hypertension, unspecified type  Hyperlipidemia, unspecified hyperlipidemia type  Pacemaker at end of battery life  Glaucoma of both eyes, unspecified glaucoma type  Chronic midline low back pain without sciatica  Hypothyroidism, unspecified type  Obesity, unspecified classification, unspecified obesity type, unspecified whether serious comorbidity present  S/P dilatation and curettage 1970s  Artificial cardiac pacemaker 2008        FAMILY HISTORY:  No pertinent family history in first degree relatives      Social History:    Lives at home with sister.    No smoking/ etoh use.        Allergies:  No Known Allergies   (10 Oct 2023 16:10)      PAST MEDICAL & SURGICAL HISTORY:  Chronic atrial fibrillation      Hypertension, unspecified type      Hyperlipidemia, unspecified hyperlipidemia type      Pacemaker at end of battery life      Glaucoma of both eyes, unspecified glaucoma type      Chronic midline low back pain without sciatica      Hypothyroidism, unspecified type      Obesity, unspecified classification, unspecified obesity type, unspecified whether serious comorbidity present      S/P dilatation and curettage  1970s      Artificial cardiac pacemaker  2008          Home Medications:  atorvastatin 40 mg oral tablet: 1 tab(s) orally once a day (10 Oct 2023 14:45)  carvedilol 6.25 mg oral tablet: 0.5 tab(s) orally 2 times a day (10 Oct 2023 14:45)  digoxin 125 mcg (0.125 mg) oral tablet: 1 tab(s) orally every other day (10 Oct 2023 13:55)  donepezil 10 mg oral tablet: 1 tab(s) orally once a day (at bedtime) (10 Oct 2023 14:45)  furosemide 20 mg oral tablet: 1 tab(s) orally every other day (10 Oct 2023 13:55)  latanoprost 0.005% ophthalmic solution: 1 drop(s) in each eye once a day (in the evening) (10 Oct 2023 13:56)  lisinopril 5 mg oral tablet: 1 tab(s) orally once a day (10 Oct 2023 14:45)  memantine 10 mg oral tablet: 1 tab(s) orally 2 times a day (10 Oct 2023 14:45)  spironolactone 25 mg oral tablet: 1 tab(s) orally once a day (10 Oct 2023 14:45)  Synthroid 50 mcg (0.05 mg) oral tablet: 1 tab(s) orally once a day (in the morning) (10 Oct 2023 13:56)  Tylenol Extra Strength 500 mg oral tablet: 2 tab(s) orally 2 times a day as needed for hip pain (10 Oct 2023 14:45)  warfarin 4 mg oral tablet: 1 tab(s) orally x 2 days then 5 mg daily x 1 day ***pt on 3 day cycle 4mg, 4mg, 5mg*** (10 Oct 2023 14:45)  warfarin 5 mg oral tablet: 1 tab(s) orally ***pt on 3 day cycle 4mg, 4mg, 5mg*** (10 Oct 2023 14:45)      MEDICATIONS  (STANDING):  atorvastatin 40 milliGRAM(s) Oral at bedtime  carvedilol 3.125 milliGRAM(s) Oral every 12 hours  digoxin     Tablet 125 MICROGram(s) Oral every other day  donepezil 10 milliGRAM(s) Oral at bedtime  influenza  Vaccine (HIGH DOSE) 0.7 milliLiter(s) IntraMuscular once  latanoprost 0.005% Ophthalmic Solution 1 Drop(s) Both EYES at bedtime  levothyroxine 50 MICROGram(s) Oral daily  lisinopril 5 milliGRAM(s) Oral daily  memantine 10 milliGRAM(s) Oral two times a day  QUEtiapine 12.5 milliGRAM(s) Oral at bedtime  warfarin 3 milliGRAM(s) Oral once    MEDICATIONS  (PRN):  acetaminophen     Tablet .. 650 milliGRAM(s) Oral every 6 hours PRN Temp greater or equal to 38C (100.4F), Mild Pain (1 - 3)  aluminum hydroxide/magnesium hydroxide/simethicone Suspension 30 milliLiter(s) Oral every 4 hours PRN Dyspepsia  melatonin 3 milliGRAM(s) Oral at bedtime PRN Insomnia  ondansetron Injectable 4 milliGRAM(s) IV Push every 8 hours PRN Nausea and/or Vomiting  QUEtiapine 12.5 milliGRAM(s) Oral at bedtime PRN agitation      Allergies    No Known Allergies    Intolerances        SOCIAL HISTORY: Denies tobacco, etoh abuse or illicit drug use    FAMILY HISTORY:  No pertinent family history in first degree relatives        Vital Signs Last 24 Hrs  T(C): 36.4 (18 Oct 2023 15:26), Max: 37.2 (18 Oct 2023 08:14)  T(F): 97.5 (18 Oct 2023 15:26), Max: 99 (18 Oct 2023 08:14)  HR: 94 (18 Oct 2023 15:26) (82 - 94)  BP: 127/81 (18 Oct 2023 15:26) (122/80 - 135/89)  BP(mean): --  RR: 18 (18 Oct 2023 15:26) (18 - 18)  SpO2: 93% (18 Oct 2023 15:26) (93% - 96%)    Parameters below as of 18 Oct 2023 15:26  Patient On (Oxygen Delivery Method): room air            REVIEW OF SYSTEMS:    CONSTITUTIONAL:  As per HPI.  HEENT:  Eyes:  No diplopia or blurred vision. ENT:  No earache, sore throat or runny nose.  CARDIOVASCULAR:  No pressure, squeezing, tightness, heaviness or aching about the chest, neck, axilla or epigastrium.  RESPIRATORY:  No cough, shortness of breath, PND or orthopnea.  GASTROINTESTINAL:  No nausea, vomiting or diarrhea.  GENITOURINARY:  No dysuria, frequency or urgency.  MUSCULOSKELETAL:  As per HPI.  SKIN:  No change in skin, hair or nails.  NEUROLOGIC:  No paresthesias, fasciculations, seizures or weakness.  PSYCHIATRIC:  No disorder of thought or mood.  ENDOCRINE:  No heat or cold intolerance, polyuria or polydipsia.  HEMATOLOGICAL:  No easy bruising or bleedings:  .     PHYSICAL EXAMINATION:    GENERAL APPEARANCE:  Pt. is not currently dyspneic, in no distress. Pt. is alert, oriented, and pleasant.  HEENT:  Pupils are normal and react normally. No icterus. Mucous membranes well colored.  NECK:  Supple. No lymphadenopathy. Jugular venous pressure not elevated. Carotids equal.   HEART:   The cardiac impulse has a normal quality. Regular. Normal S1 and S2. There are no murmurs, rubs or gallops noted  CHEST:  Chest is clear to auscultation. Normal respiratory effort.  ABDOMEN:  Soft and nontender.   EXTREMITIES:  There is no cyanosis, clubbing or edema.   SKIN:  No rash or significant lesions are noted.    LABS:                        12.8   14.20 )-----------( 299      ( 18 Oct 2023 09:27 )             37.4     10-18    137  |  114<H>  |  2<L>  ----------------------------<  81  4.3   |  18<L>  |  0.41<L>    Ca    7.7<L>      18 Oct 2023 08:32    TPro  4.6<L>  /  Alb  2.1<L>  /  TBili  0.5  /  DBili  x   /  AST  19  /  ALT  27  /  AlkPhos  61  10-17    LIVER FUNCTIONS - ( 17 Oct 2023 07:48 )  Alb: 2.1 g/dL / Pro: 4.6 gm/dL / ALK PHOS: 61 U/L / ALT: 27 U/L / AST: 19 U/L / GGT: x           PT/INR - ( 18 Oct 2023 09:27 )   PT: 24.8 sec;   INR: 2.25 ratio         PTT - ( 17 Oct 2023 07:48 )  PTT:29.5 sec      Urinalysis Basic - ( 18 Oct 2023 08:32 )    Color: x / Appearance: x / SG: x / pH: x  Gluc: 81 mg/dL / Ketone: x  / Bili: x / Urobili: x   Blood: x / Protein: x / Nitrite: x   Leuk Esterase: x / RBC: x / WBC x   Sq Epi: x / Non Sq Epi: x / Bacteria: x            RADIOLOGY & ADDITIONAL STUDIES:     CT Angio Chest PE Protocol w/ IV Cont (10.17.23 @ 14:29) >  IMPRESSION:    No pulmonary embolism.    New moderate pleural effusions.    Interval decompression of the colon which is now mostly collapsed. No   pneumatosis seen.    Decreased small bowel dilatation.      US Duplex Venous Upper Ext Ltd, Left (10.16.23 @ 12:11) >  IMPRESSION:  No evidence of left upper extremity deep venous thrombosis.    Superficial thrombophlebitis of the left cephalic vein, within the   forearm.      
Patient is a 83y old  Female who presents with a chief complaint of weakness, diarrhea    HPI:  This is an 83 year old female with significant past medical history of dementia, chronic AFIB on coumadin, s/p PPM, HTN, HLD, glaucoma, hypothyroid presenting to [facility] with weakness and loose stools. Patient evaluated this morning at bedside. On isolation for COVID. Arousable but not oriented. History obtained from chart, RN, and hospitalist. Patient with near syncope at home, eased to the floor by sister. Had several episodes loose stools with generalized weakness prompting presentation to ED. In ER  found to have hypotension with inital SBP in 50's.  Elevated lactate 3.1.  Patient given 2.5 L NSS.  INR 13. CT concerning for ischemic colitis-- right colon.  Surgery was consulted who recommended conservative management for now and close f/u exams. UTI+. On 1:1 observation for safety and to maintain IV line.      PAST MEDICAL & SURGICAL HISTORY:  Chronic atrial fibrillation      Hypertension, unspecified type      Hyperlipidemia, unspecified hyperlipidemia type      Pacemaker at end of battery life      Glaucoma of both eyes, unspecified glaucoma type      Chronic midline low back pain without sciatica      Hypothyroidism, unspecified type      Obesity, unspecified classification, unspecified obesity type, unspecified whether serious comorbidity present      S/P dilatation and curettage  1970s      Artificial cardiac pacemaker  2008          MEDICATIONS  (STANDING):  atorvastatin 40 milliGRAM(s) Oral at bedtime  digoxin     Tablet 125 MICROGram(s) Oral every other day  donepezil 10 milliGRAM(s) Oral at bedtime  influenza  Vaccine (HIGH DOSE) 0.7 milliLiter(s) IntraMuscular once  latanoprost 0.005% Ophthalmic Solution 1 Drop(s) Both EYES at bedtime  levothyroxine 50 MICROGram(s) Oral daily  memantine 10 milliGRAM(s) Oral two times a day  piperacillin/tazobactam IVPB.. 3.375 Gram(s) IV Intermittent every 8 hours  potassium chloride  10 mEq/100 mL IVPB 10 milliEquivalent(s) IV Intermittent every 1 hour  QUEtiapine 12.5 milliGRAM(s) Oral at bedtime  sodium chloride 0.9% Bolus 1000 milliLiter(s) IV Bolus once  sodium chloride 0.9%. 1000 milliLiter(s) (100 mL/Hr) IV Continuous <Continuous>    MEDICATIONS  (PRN):  acetaminophen     Tablet .. 650 milliGRAM(s) Oral every 6 hours PRN Temp greater or equal to 38C (100.4F), Mild Pain (1 - 3)  aluminum hydroxide/magnesium hydroxide/simethicone Suspension 30 milliLiter(s) Oral every 4 hours PRN Dyspepsia  melatonin 3 milliGRAM(s) Oral at bedtime PRN Insomnia  ondansetron Injectable 4 milliGRAM(s) IV Push every 8 hours PRN Nausea and/or Vomiting  QUEtiapine 12.5 milliGRAM(s) Oral at bedtime PRN agitation      Allergies    No Known Allergies    Intolerances        SOCIAL HISTORY:    FAMILY HISTORY:  No pertinent family history in first degree relatives      REVIEW OF SYSTEMS:  Unable to evaluate due to baseline mentation without orientation     Vital Signs Last 24 Hrs  T(C): 36.7 (11 Oct 2023 08:58), Max: 36.9 (10 Oct 2023 23:47)  T(F): 98.1 (11 Oct 2023 08:58), Max: 98.4 (10 Oct 2023 23:47)  HR: 90 (11 Oct 2023 08:58) (84 - 92)  BP: 97/42 (11 Oct 2023 08:58) (97/42 - 122/74)  BP(mean): 85 (10 Oct 2023 17:06) (82 - 100)  RR: 18 (11 Oct 2023 08:58) (18 - 22)  SpO2: 94% (11 Oct 2023 08:58) (94% - 99%)    Parameters below as of 11 Oct 2023 08:58  Patient On (Oxygen Delivery Method): nasal cannula    PHYSICAL EXAM:    Constitutional: No acute distress, elderly, pale  HEENT:  not icteric  Neck: supple, no lymphadenopathy  Respiratory: clear to ascultation bilaterally, no wheezing  Cardiovascular: S1 and S2, irregular rate and rhythm, no murmurs rubs or gallops  Gastrointestinal: softly distended, right sided tenderness, absent bowel sounds, no rebound or guarding, no surgical scars, no drains  Extremities: No peripheral edema, no cyanosis or clubbing  Vascular: 2+ peripheral pulses, no venous stasis  Neurological: arousable, not oriented, no asterixis  Psychiatric: Normal mood, normal affect  Skin: No rashes, not jaundiced    LABS:                        10.9   9.16  )-----------( 264      ( 11 Oct 2023 08:09 )             31.7     10-11    132<L>  |  105  |  47<H>  ----------------------------<  90  2.9<LL>   |  19<L>  |  0.94    Ca    8.0<L>      11 Oct 2023 08:09    TPro  5.7<L>  /  Alb  2.6<L>  /  TBili  0.8  /  DBili  x   /  AST  37  /  ALT  36  /  AlkPhos  82  10-10    PT/INR - ( 11 Oct 2023 08:09 )   PT: 50.0 sec;   INR: 4.63 ratio         PTT - ( 10 Oct 2023 11:03 )  PTT:45.6 sec  LIVER FUNCTIONS - ( 10 Oct 2023 11:03 )  Alb: 2.6 g/dL / Pro: 5.7 gm/dL / ALK PHOS: 82 U/L / ALT: 36 U/L / AST: 37 U/L / GGT: x             RADIOLOGY & ADDITIONAL STUDIES:
Patient is a 83y old  Female who presents with a chief complaint of weakness, diarrhea (11 Oct 2023 11:25)    HPI:  84 y/o female with PMHx of dementia, chronic AFIB on coumadin, s/p PPM, HTN, HLD, glaucoma, hypothyroid who presented to  with CC of weakness, diarrhea.  History is obtained from patient's family.  Patient lives at home with her sister-- Kathy.  Kathy was out of town last few days but as per family, patient has been declining.  She wasn't eating as much, having some diarrhea.  She had her INR checked which was elevated last week and her coumadin dosing was adjusted. Pts sister tried getting her up OOB to clean her up after having diarrhea and she came weak and had near syncope and they lowered her to the ground.  No fall/ trauma.  They brought her to the Er for further evaluation.  In the ER, leonarda found to have hypotension with inital SBP in 50's.  Elevated lactate 3.1.  Patient given 2.5 L NSS.  CT concerning for ischemic colitis-- right colon.  Surgery was consulted who recommended conservative management for now and close f/u exams.  Patient denies abd pain.  Patient also tested positive for COVID-- sister denies sx other than weakness/ diarrhea.  Patient also found to have UTI with postiive UA.  Patient pancultured and started on cipro/ flagyl.  Also given 1 dose zosyn.        PAST MEDICAL & SURGICAL HISTORY:  Chronic atrial fibrillation  Hypertension, unspecified type  Hyperlipidemia, unspecified hyperlipidemia type  Pacemaker at end of battery life  Glaucoma of both eyes, unspecified glaucoma type  Chronic midline low back pain without sciatica  Hypothyroidism, unspecified type  Obesity, unspecified classification, unspecified obesity type, unspecified whether serious comorbidity present  S/P dilatation and curettage 1970s  Artificial cardiac pacemaker 2008      Meds: per reconciliation sheet, noted below  MEDICATIONS  (STANDING):  atorvastatin 40 milliGRAM(s) Oral at bedtime  digoxin     Tablet 125 MICROGram(s) Oral every other day  donepezil 10 milliGRAM(s) Oral at bedtime  influenza  Vaccine (HIGH DOSE) 0.7 milliLiter(s) IntraMuscular once  latanoprost 0.005% Ophthalmic Solution 1 Drop(s) Both EYES at bedtime  levothyroxine 50 MICROGram(s) Oral daily  memantine 10 milliGRAM(s) Oral two times a day  piperacillin/tazobactam IVPB.. 3.375 Gram(s) IV Intermittent every 8 hours  potassium chloride  10 mEq/100 mL IVPB 10 milliEquivalent(s) IV Intermittent every 1 hour  QUEtiapine 12.5 milliGRAM(s) Oral at bedtime  sodium chloride 0.9% Bolus 1000 milliLiter(s) IV Bolus once  sodium chloride 0.9%. 1000 milliLiter(s) (100 mL/Hr) IV Continuous <Continuous>    Allergies    No Known Allergies    Intolerances      Social: no smoking, no alcohol, no illegal drugs; no recent travel, no exposure to TB  FAMILY HISTORY:  No pertinent family history in first degree relatives       no history of premature cardiovascular disease in first degree relatives    ROS: unable to obtain d/t medical condition    All other systems reviewed and are negative    Vital Signs Last 24 Hrs  T(C): 36.7 (11 Oct 2023 08:58), Max: 36.9 (10 Oct 2023 23:47)  T(F): 98.1 (11 Oct 2023 08:58), Max: 98.4 (10 Oct 2023 23:47)  HR: 90 (11 Oct 2023 08:58) (84 - 92)  BP: 97/42 (11 Oct 2023 08:58) (87/48 - 122/74)  BP(mean): 85 (10 Oct 2023 17:06) (61 - 100)  RR: 18 (11 Oct 2023 08:58) (17 - 22)  SpO2: 94% (11 Oct 2023 08:58) (94% - 99%)    Parameters below as of 11 Oct 2023 08:58  Patient On (Oxygen Delivery Method): nasal cannula      Daily     Daily     PE:  Constitutional: frail looking  HEENT: NC/AT, EOMI, PERRLA, conjunctivae clear; ears and nose atraumatic; pharynx benign  Neck: supple; thyroid not palpable  Back: no tenderness  Respiratory: respiratory effort normal; clear to auscultation  Cardiovascular: S1S2 regular, no murmurs  Abdomen: soft, not tender, not distended, positive BS; liver and spleen WNL  Genitourinary: no suprapubic tenderness  Lymphatic: no LN palpable  Musculoskeletal: no muscle tenderness, no joint swelling or tenderness  Extremities: no pedal edema  Neurological/ Psychiatric: moving all extremities  Skin: no rashes; no palpable lesions    Labs: all available labs reviewed                        10.9   9.16  )-----------( 264      ( 11 Oct 2023 08:09 )             31.7     10-11    132<L>  |  105  |  47<H>  ----------------------------<  90  2.9<LL>   |  19<L>  |  0.94    Ca    8.0<L>      11 Oct 2023 08:09    TPro  5.7<L>  /  Alb  2.6<L>  /  TBili  0.8  /  DBili  x   /  AST  37  /  ALT  36  /  AlkPhos  82  10-10     LIVER FUNCTIONS - ( 10 Oct 2023 11:03 )  Alb: 2.6 g/dL / Pro: 5.7 gm/dL / ALK PHOS: 82 U/L / ALT: 36 U/L / AST: 37 U/L / GGT: x           Urinalysis Basic - ( 11 Oct 2023 08:09 )    Color: x / Appearance: x / SG: x / pH: x  Gluc: 90 mg/dL / Ketone: x  / Bili: x / Urobili: x   Blood: x / Protein: x / Nitrite: x   Leuk Esterase: x / RBC: x / WBC x   Sq Epi: x / Non Sq Epi: x / Bacteria: x          Radiology: all available radiological tests reviewed  < from: CT Abdomen and Pelvis w/ IV Cont (10.10.23 @ 12:13) >    ACC: [id number] EXAM:  CT ABDOMEN AND PELVIS IC   ORDERED BY: CALIXTO THOMPSON     ACC: [id number] EXAM:  CT CHEST IC   ORDERED BY: CALIXTO THOMPSON     PROCEDURE DATE:  10/10/2023          INTERPRETATION:  CLINICAL INFORMATION: Hypotension, left-sidedabdominal   pain    COMPARISON: None.    CONTRAST/COMPLICATIONS:  IV Contrast: Omnipaque 350 (accession [id number]), IV contrast documented   in unlinked concurrent exam (accession [id number])  90 cc administered   10   cc discarded  Oral Contrast: NONE  Complications: None reported at time of study completion    PROCEDURE:  CT of the Chest, Abdomen and Pelvis was performed.  Sagittal and coronal reformats were performed.    FINDINGS:  CHEST:  LUNGS AND LARGE AIRWAYS: Patent central airways. No consolidation.  PLEURA: Trace right pleural effusion.  VESSELS: Normal caliber thoracic aorta. Slightly dilated main pulmonary   artery.  HEART: Cardiomegaly with biatrial enlargement. No pericardial effusion.   Coronary artery calcifications.  MEDIASTINUMAND JC: No lymphadenopathy.  CHEST WALL AND LOWER NECK: Left chest wall pacemaker with the right   atrium and right ventricle. Heterogeneous and enlarged right thyroid   lobe. Irregular hypoattenuating lesion posterior to the left thyroid   lobe, possibly representing a thyroid nodule or parathyroid lesion   measuring 1.2 x 0.8 cm (2; 5).    ABDOMEN AND PELVIS:  LIVER: Scattered hypoattenuating liver lesions, incompletely   characterized, likely benign cysts and/or hemangiomas.  BILE DUCTS: Normal caliber.  GALLBLADDER: Within normal limits.  SPLEEN: Within normal limits.  PANCREAS: Within normal limits.  ADRENALS: Within normal limits.  KIDNEYS/URETERS: Within normal limits.    BLADDER: Within normal limits.  REPRODUCTIVE ORGANS: Fibroid uterus.    BOWEL: Distended and fluid-filled ascending colon with pneumatosis,   suspicious for ischemic colitis. Diffusely related to fluid-filled loops   of small bowel extending to the level of ileocecal valve, likely   representing reactive ileus. Appendix is normal.  PERITONEUM: Small volume ascites  VESSELS: Normal caliber abdominal aorta with mild scattered calcified   atherosclerotic plaque. The celiac artery, superior mesenteric artery,   and inferior mesenteric artery are grossly patent. The superior   mesenteric vein, portal vein, and splenic vein are grossly patent.  RETROPERITONEUM/LYMPH NODES: No lymphadenopathy.  ABDOMINAL WALL: Moderate anasarca. Moderate fat-containing umbilical   hernia with ascites fluid within the hernia sac.  BONES: Severe degenerative changes of bilateral hips. Degenerative   changes throughout the thoracolumbar spine.    IMPRESSION:  1.  Fluid-filled ascending colon pneumatosis, suspicious for acute   ischemic colitis.  2.  Diffusely distended small bowel loops likely representing a reactive   ileus. No transition point to suggest a small bowel obstruction.  3.  Small volume ascites, possibly reactive.    Findings of ischemic colitis were discussed with Dr. CALIXTO THOMPSON   10/10/2023 12:59 PM Mauricio Neal with read back confirmation.    --- End of Report ---    < end of copied text >    Advanced directives addressed: full resuscitation
HPI/Reason for consult: 83 year old female with PMH chronic a-fib (on warfarin), PPM (? at end of life per chart), HTN, HLD, umbilical hernia (sister says she was not a surgical candidate to have that fixed) and dementia. Pt with poor appetite and diarrhea x a few days. Today was weak and unsteady, no fall/helped to floor by sister. Pt unable to contribute to history or ROS, says she feels fine. Consulted by ED d/t hypotension (SBP 50s on arrival but improved to  after 2.5L IVF resuscitation), UTI and ischemic colitis as seen on CT.  ED interventions: NS 2.5L IVF, cipro, flagyl , zosyn, vitamin K 5mg PO x 1. Surgical consult.      PMH/PSH:  Chronic atrial fibrillation  Hypertension, unspecified type  Hyperlipidemia, unspecified hyperlipidemia type  Pacemaker at end of battery life  Glaucoma of both eyes, unspecified glaucoma type  Chronic midline low back pain without sciatica  Hypothyroidism, unspecified type  Obesity, unspecified classification, unspecified obesity type, unspecified whether serious comorbidity present  S/P dilatation and curettage,   Artificial cardiac pacemaker,     FAMILY HISTORY:  No pertinent family history in first degree relatives    SOCIAL HISTORY:   Smoker: denies  ETOH use: denies  Ilicit Drug use: denies    Lives with: sister  ADLs: partially dependent, can feed self, brush teeth but does not bathe or drive. Needs meds laid out for her)    Code Status: full code  (see [facility] note)  HCP/surrogate: sister Nona (see [facility] note)      MEDICATIONS  (STANDING):  metroNIDAZOLE  IVPB 500 milliGRAM(s) IV Intermittent once    No Known Allergies                                                            LABS:                        12.3   15.24 )-----------( 351      ( 10 Oct 2023 11:03 )             35.5     10-10    127<L>  |  96  |  60<H>  ----------------------------<  142<H>  4.1   |  19<L>  |  1.78<H>    Ca    8.7      10 Oct 2023 11:03    TPro  5.7<L>  /  Alb  2.6<L>  /  TBili  0.8  /  DBili  x   /  AST  37  /  ALT  36  /  AlkPhos  82  10-10    PT/INR - ( 10 Oct 2023 11:03 )   PT: 136.8 sec;   INR: 13.05 ratio      PTT - ( 10 Oct 2023 11:03 )  PTT:45.6 sec    Lactate, Blood (10.10.23 @ 12:43) 3.1 mmol/L    Troponin I, High Sensitivity (10.10.23 @ 11:03) 31.92 ng/L    Lipase (10.10.23 @ 11:03) 15 U/L    Urinalysis Basic - ( 10 Oct 2023 11:03 )  Color: Dark Yellow / Appearance: Turbid / S.025 / pH: x  Gluc: 142 mg/dL / Ketone: Trace mg/dL  / Bili: Small / Urobili: 1.0 mg/dL   Blood: x / Protein: 30 mg/dL / Nitrite: Negative   Leuk Esterase: Large / RBC: 12 /HPF / WBC >998 /HPF   Sq Epi: x / Non Sq Epi: 1 /HPF / Bacteria: Many /HPF      < from: CT Abdomen and Pelvis w/ IV Cont (10.10.23 @ 12:13) >  < from: CT Chest w/ IV Cont (10.10.23 @ 12:12) >  FINDINGS:  CHEST:  LUNGS AND LARGE AIRWAYS: Patent central airways. No consolidation.  PLEURA: Trace right pleural effusion.  VESSELS: Normal caliber thoracic aorta. Slightly dilated main pulmonary artery.  HEART: Cardiomegaly with biatrial enlargement. No pericardial effusion. Coronary artery calcifications.  MEDIASTINUMAND JC: No lymphadenopathy.  CHEST WALL AND LOWER NECK: Left chest wall pacemaker with the right atrium and right ventricle. Heterogeneous and enlarged right thyroid lobe. Irregular hypoattenuating lesion posterior to the left thyroid  lobe, possibly representing a thyroid nodule or parathyroid lesion measuring 1.2 x 0.8 cm (2; 5).  ABDOMEN AND PELVIS:  LIVER: Scattered hypoattenuating liver lesions, incompletely characterized, likely benign cysts and/or hemangiomas.  BILE DUCTS: Normal caliber.  GALLBLADDER: Within normal limits.  SPLEEN: Within normal limits.  PANCREAS: Within normal limits.  ADRENALS: Within normal limits.  KIDNEYS/URETERS: Within normal limits.  BLADDER: Within normal limits.  REPRODUCTIVE ORGANS: Fibroid uterus.  BOWEL: Distended and fluid-filled ascending colon with pneumatosis, suspicious for ischemic colitis. Diffusely related to fluid-filled loops of small bowel extending to the level of ileocecal valve, likely  representing reactive ileus. Appendix is normal.   PERITONEUM: Small volume ascites  VESSELS: Normal caliber abdominal aorta with mild scattered calcified atherosclerotic plaque. The celiac artery, superior mesenteric artery, and inferior mesenteric artery are grossly patent. The superior  mesenteric vein, portal vein, and splenic vein are grossly patent.  RETROPERITONEUM/LYMPH NODES: No lymphadenopathy.  ABDOMINAL WALL: Moderate anasarca. Moderate fat-containing umbilical hernia with ascites fluid within the hernia sac.  BONES: Severe degenerative changes of bilateral hips. Degenerative changes throughout the thoracolumbar spine.  IMPRESSION:  1.  Fluid-filled ascending colon pneumatosis, suspicious for acute ischemic colitis.  2.  Diffusely distended small bowel loops likely representing a reactive ileus. No transition point to suggest a small bowel obstruction.  3.  Small volume ascites, possibly reactive.  < end of copied text >    < from: CT Cervical Spine No Cont (10.10.23 @ 12:10) >  < from: CT Head No Cont (10.10.23 @ 12:10) >  IMPRESSION:  Cervical spine CT: No acute fractures or dislocations.  Multilevel cervical spondylosis.  2 cm right-sided thyroid nodule. Recommend ultrasound correlation as thyroid neoplasm cannot be excluded.  Head CT: No acuteintracranial hemorrhage, mass effect, or shift of the midline structures.  Mild chronic white matter microvascular type changes.  --- End of Report ---  < end of copied text >    < from: Xray Chest 1 View-PORTABLE IMMEDIATE (10.10.23 @ 11:52) >  Heart magnified by technique and is possibly enlarged.  Left-sided pacemaker again noted.  Lungs remain clear.  Present film shows slight left tracheal deviation above the thoracic inlet suggesting right lobe thyroid enlargement. This is new since 2017.  IMPRESSION:   Probable heart enlargement and left-sided pacemaker again noted. No infiltrate.  Present film shows slight left tracheal deviation above the thoracic inlet suggesting right lobe thyroid enlargement. This is new.  --- End of Report ---  < end of copied text >      T(C): 37.1 (10-10-23 @ 11:05), Max: 37.1 (10-10-23 @ 11:05)  HR: 92 (10-10-23 @ 13:57) (46 - 94), a-fib  BP: 110/69 (10-10-23 @ 13:57) (55/40 - 110/69)  RR: 21 (10-10-23 @ 13:57) (17 - 26)  SpO2: 98% (10-10-23 @ 13:57) (92% - 100%)      Physical Exam  General: NAD                                                         Neuro: awake, answers to name but otherwise not oriented                      Eyes: PERRL, EOMI   ENT: Normal exam of nasal/oral mucosa with absence of cyanosis.   Neck: supple  Chest: CTA, good air entry, equal bilaterally, no wheezes/rales/rhonchi, normal excursion, no accessory muscle use noted  CV: regular rate, irregularly irregular rhythm, +S1S2  GI: softly distended, tympanic, NT. No peritoneal signs.  Extremities: MAHONEY x 4, no C/C/E  SKIN: warm, dry

## 2023-10-19 NOTE — PROGRESS NOTE ADULT - ASSESSMENT
84 y/o female with PMHx of dementia, chronic AFIB on coumadin, s/p PPM, HTN, HLD, glaucoma, hypothyroid who presented to  with CC of weakness, diarrhea and near syncope at home. Patient admitted for ischemic colitis, UTI, septic shock and COVID.      PROBLEMS:    S/P Septic Shock - Sources of infection UTI / COVID-19 / ischemic colitis  COVID  New moderate pleural effusions with atelectasis  SHANA:  resolved  HTN:    Hypothyroid:      PLAN:    pulmonary stable  Consider thoro for pleural effusion-CT surgery eval.  C diff pcr negative-BCx negative   UCx with streptococcus, klebsiella S/P zosyn   Ischemic Colitis-surgical eval-- no plans for OR  chronic afib (on coumadin)   SHANA-resolved  COVID-SYMPTOMATIC RX  DVT Proph: coumadin

## 2023-10-19 NOTE — CONSULT NOTE ADULT - CONSULT REASON
Pleural effusion
Ischemic colitis
evaluation for ICU admission of pt with hypotension and ischemic colitis as seen on CT
COVID 19
ischemic colitis
GOC
diarrhea ischemic colitis  uti

## 2023-10-19 NOTE — CONSULT NOTE ADULT - ASSESSMENT
84 y/o female with PMHx of dementia, chronic AFIB on coumadin, s/p PPM, HTN, HLD, glaucoma, hypothyroid who presented to  with CC of weakness, diarrhea.  Pt found to be + COVID.  Consulted for b/l pleural effusions     Plan   Pt stable on RA  unable to contact HCP- no number in the chart - spoke to RN/staff to see if there are any visitors   + Rt pleural effusion   Recommend Lasix IF BP/ Creatinine allows   Dr Smith to review CT scan   DW Dr Smith

## 2023-10-19 NOTE — PROGRESS NOTE ADULT - SUBJECTIVE AND OBJECTIVE BOX
Subjective:    pat better, sitting in chair, on RA sat 98%, no respiratory distress.    Home Medications:  atorvastatin 40 mg oral tablet: 1 tab(s) orally once a day (10 Oct 2023 14:45)  carvedilol 6.25 mg oral tablet: 0.5 tab(s) orally 2 times a day (10 Oct 2023 14:45)  digoxin 125 mcg (0.125 mg) oral tablet: 1 tab(s) orally every other day (10 Oct 2023 13:55)  donepezil 10 mg oral tablet: 1 tab(s) orally once a day (at bedtime) (10 Oct 2023 14:45)  furosemide 20 mg oral tablet: 1 tab(s) orally every other day (10 Oct 2023 13:55)  latanoprost 0.005% ophthalmic solution: 1 drop(s) in each eye once a day (in the evening) (10 Oct 2023 13:56)  lisinopril 5 mg oral tablet: 1 tab(s) orally once a day (10 Oct 2023 14:45)  memantine 10 mg oral tablet: 1 tab(s) orally 2 times a day (10 Oct 2023 14:45)  spironolactone 25 mg oral tablet: 1 tab(s) orally once a day (10 Oct 2023 14:45)  Synthroid 50 mcg (0.05 mg) oral tablet: 1 tab(s) orally once a day (in the morning) (10 Oct 2023 13:56)  Tylenol Extra Strength 500 mg oral tablet: 2 tab(s) orally 2 times a day as needed for hip pain (10 Oct 2023 14:45)  warfarin 4 mg oral tablet: 1 tab(s) orally x 2 days then 5 mg daily x 1 day ***pt on 3 day cycle 4mg, 4mg, 5mg*** (10 Oct 2023 14:45)  warfarin 5 mg oral tablet: 1 tab(s) orally ***pt on 3 day cycle 4mg, 4mg, 5mg*** (10 Oct 2023 14:45)    MEDICATIONS  (STANDING):  atorvastatin 40 milliGRAM(s) Oral at bedtime  carvedilol 3.125 milliGRAM(s) Oral every 12 hours  digoxin     Tablet 125 MICROGram(s) Oral every other day  donepezil 10 milliGRAM(s) Oral at bedtime  influenza  Vaccine (HIGH DOSE) 0.7 milliLiter(s) IntraMuscular once  latanoprost 0.005% Ophthalmic Solution 1 Drop(s) Both EYES at bedtime  levothyroxine 50 MICROGram(s) Oral daily  lisinopril 5 milliGRAM(s) Oral daily  memantine 10 milliGRAM(s) Oral two times a day  nystatin Powder 1 Application(s) Topical two times a day  QUEtiapine 12.5 milliGRAM(s) Oral at bedtime    MEDICATIONS  (PRN):  acetaminophen     Tablet .. 650 milliGRAM(s) Oral every 6 hours PRN Temp greater or equal to 38C (100.4F), Mild Pain (1 - 3)  aluminum hydroxide/magnesium hydroxide/simethicone Suspension 30 milliLiter(s) Oral every 4 hours PRN Dyspepsia  melatonin 3 milliGRAM(s) Oral at bedtime PRN Insomnia  ondansetron Injectable 4 milliGRAM(s) IV Push every 8 hours PRN Nausea and/or Vomiting  QUEtiapine 12.5 milliGRAM(s) Oral at bedtime PRN agitation      Allergies    No Known Allergies    Intolerances        Vital Signs Last 24 Hrs  T(C): 36.6 (19 Oct 2023 08:23), Max: 36.9 (18 Oct 2023 21:38)  T(F): 97.9 (19 Oct 2023 08:23), Max: 98.5 (18 Oct 2023 21:38)  HR: 89 (19 Oct 2023 08:23) (78 - 94)  BP: 146/69 (19 Oct 2023 08:23) (127/81 - 146/69)  BP(mean): --  RR: 18 (19 Oct 2023 08:23) (18 - 19)  SpO2: 93% (19 Oct 2023 08:23) (93% - 98%)    Parameters below as of 19 Oct 2023 08:23  Patient On (Oxygen Delivery Method): room air          PHYSICAL EXAMINATION:    NECK:  Supple. No lymphadenopathy. Jugular venous pressure not elevated. Carotids equal.   HEART:   The cardiac impulse has a normal quality. Reg., Nl S1 and S2.  There are no murmurs, rubs or gallops noted  CHEST:  Chest crackles to auscultation. Normal respiratory effort.  ABDOMEN:  Soft and nontender.   EXTREMITIES:  There is no edema.       LABS:                        12.8   14.20 )-----------( 299      ( 18 Oct 2023 09:27 )             37.4     10-18    137  |  114<H>  |  2<L>  ----------------------------<  81  4.3   |  18<L>  |  0.41<L>    Ca    7.7<L>      18 Oct 2023 08:32      PT/INR - ( 18 Oct 2023 09:27 )   PT: 24.8 sec;   INR: 2.25 ratio           Urinalysis Basic - ( 18 Oct 2023 08:32 )    Color: x / Appearance: x / SG: x / pH: x  Gluc: 81 mg/dL / Ketone: x  / Bili: x / Urobili: x   Blood: x / Protein: x / Nitrite: x   Leuk Esterase: x / RBC: x / WBC x   Sq Epi: x / Non Sq Epi: x / Bacteria: x

## 2023-10-19 NOTE — PROGRESS NOTE ADULT - ASSESSMENT
82 y/o female with PMHx of dementia, chronic AFIB on coumadin, s/p PPM, HTN, HLD, glaucoma, hypothyroid who presented to  with CC of weakness, diarrhea and near syncope at home. Patient admitted for ischemic colitis, UTI, septic shock and COVID.        # Septic Shock - Sources of infection UTI / COVID-19 / ischemic colitis.    - s/p tele monitoring  - CT abd/pelvis as above  - c/w IV zosyn  - C diff pcr negative , BCx negative   - UCx with streptococcus, klebsiella - both sensitive to zosyn   - BP now rising off home meds, restarted home coreg + lisinopril   - Suspect shock more related to UTI/ ischemic colitis.    - lactate 3.1 -> 1.5 /  WBC trending down /  Na improved    # B/l Moderate Pleural Effusions: CT sx consult    # Ischemic Colitis:  - Right colon on CT  - Appreciate surgical eval-- no plans for OR for now.    - Start CLD yesterday, ADAT   - Distended on exam but no significant tenderness on exam.    - Serial abd exams.      # Coagulopathy, supratherapeutic INR (POA)  # chronic afib (on coumadin)   - Secondary to coumadin    1.30 today after dose of vitaminK  INR very labile,   Home coumadin 3mg, will give 2mg Coumadin today.   - coumadin resumed, goal INR 2-3    # SHANA:    - Suspect all prerenal.    - Patient was on lasix at home.    - s/p 2.5 L in ER.    - NSS @ 100.    - Cr 1.78 -> 0.56    #COVID:    Patient with sx of weakness/ diarrhea but could also be explained by colitis/ UTI.    Trend.    No SOB/ cough/ fever.    Supportive care for now.    Repeat Covid test     # Diarrhea:    - C diff negative    - May be related to ischemic colitis/ COVID.      # Dementia:    Cont aricept/ namenda.    CO on floors.    Seroquel PRN.      #HTN:    Hold  Lasix/ Spironolactone with hypotension.    - BP now rising off home meds, restarted home coreg + lisinopril     #Hypothyroid:    - Cont synthroid.      #HLD:    - Cont statin.      #DVT Proph: coumadin     #Advanced directives:  as above.  DNR/ DNI.  No pressors.  No central line.

## 2023-10-20 LAB
ANION GAP SERPL CALC-SCNC: 5 MMOL/L — SIGNIFICANT CHANGE UP (ref 5–17)
ANION GAP SERPL CALC-SCNC: 5 MMOL/L — SIGNIFICANT CHANGE UP (ref 5–17)
BUN SERPL-MCNC: 8 MG/DL — SIGNIFICANT CHANGE UP (ref 7–23)
BUN SERPL-MCNC: 8 MG/DL — SIGNIFICANT CHANGE UP (ref 7–23)
CALCIUM SERPL-MCNC: 8 MG/DL — LOW (ref 8.5–10.1)
CALCIUM SERPL-MCNC: 8 MG/DL — LOW (ref 8.5–10.1)
CHLORIDE SERPL-SCNC: 109 MMOL/L — HIGH (ref 96–108)
CHLORIDE SERPL-SCNC: 109 MMOL/L — HIGH (ref 96–108)
CO2 SERPL-SCNC: 28 MMOL/L — SIGNIFICANT CHANGE UP (ref 22–31)
CO2 SERPL-SCNC: 28 MMOL/L — SIGNIFICANT CHANGE UP (ref 22–31)
CREAT SERPL-MCNC: 0.5 MG/DL — SIGNIFICANT CHANGE UP (ref 0.5–1.3)
CREAT SERPL-MCNC: 0.5 MG/DL — SIGNIFICANT CHANGE UP (ref 0.5–1.3)
EGFR: 93 ML/MIN/1.73M2 — SIGNIFICANT CHANGE UP
EGFR: 93 ML/MIN/1.73M2 — SIGNIFICANT CHANGE UP
GLUCOSE SERPL-MCNC: 94 MG/DL — SIGNIFICANT CHANGE UP (ref 70–99)
GLUCOSE SERPL-MCNC: 94 MG/DL — SIGNIFICANT CHANGE UP (ref 70–99)
INR BLD: 3.04 RATIO — HIGH (ref 0.85–1.18)
INR BLD: 3.04 RATIO — HIGH (ref 0.85–1.18)
POTASSIUM SERPL-MCNC: 3.3 MMOL/L — LOW (ref 3.5–5.3)
POTASSIUM SERPL-MCNC: 3.3 MMOL/L — LOW (ref 3.5–5.3)
POTASSIUM SERPL-SCNC: 3.3 MMOL/L — LOW (ref 3.5–5.3)
POTASSIUM SERPL-SCNC: 3.3 MMOL/L — LOW (ref 3.5–5.3)
PROTHROM AB SERPL-ACNC: 33.3 SEC — HIGH (ref 9.5–13)
PROTHROM AB SERPL-ACNC: 33.3 SEC — HIGH (ref 9.5–13)
SODIUM SERPL-SCNC: 142 MMOL/L — SIGNIFICANT CHANGE UP (ref 135–145)
SODIUM SERPL-SCNC: 142 MMOL/L — SIGNIFICANT CHANGE UP (ref 135–145)

## 2023-10-20 PROCEDURE — 99232 SBSQ HOSP IP/OBS MODERATE 35: CPT

## 2023-10-20 PROCEDURE — 71045 X-RAY EXAM CHEST 1 VIEW: CPT | Mod: 26

## 2023-10-20 PROCEDURE — 99233 SBSQ HOSP IP/OBS HIGH 50: CPT

## 2023-10-20 RX ORDER — POTASSIUM CHLORIDE 20 MEQ
40 PACKET (EA) ORAL ONCE
Refills: 0 | Status: COMPLETED | OUTPATIENT
Start: 2023-10-20 | End: 2023-10-20

## 2023-10-20 RX ORDER — POTASSIUM CHLORIDE 20 MEQ
10 PACKET (EA) ORAL
Refills: 0 | Status: DISCONTINUED | OUTPATIENT
Start: 2023-10-20 | End: 2023-10-20

## 2023-10-20 RX ORDER — FUROSEMIDE 40 MG
20 TABLET ORAL DAILY
Refills: 0 | Status: DISCONTINUED | OUTPATIENT
Start: 2023-10-20 | End: 2023-10-23

## 2023-10-20 RX ADMIN — Medication 20 MILLIGRAM(S): at 13:12

## 2023-10-20 RX ADMIN — LISINOPRIL 5 MILLIGRAM(S): 2.5 TABLET ORAL at 11:22

## 2023-10-20 RX ADMIN — QUETIAPINE FUMARATE 12.5 MILLIGRAM(S): 200 TABLET, FILM COATED ORAL at 20:21

## 2023-10-20 RX ADMIN — CARVEDILOL PHOSPHATE 3.12 MILLIGRAM(S): 80 CAPSULE, EXTENDED RELEASE ORAL at 11:22

## 2023-10-20 RX ADMIN — NYSTATIN CREAM 1 APPLICATION(S): 100000 CREAM TOPICAL at 05:40

## 2023-10-20 RX ADMIN — LATANOPROST 1 DROP(S): 0.05 SOLUTION/ DROPS OPHTHALMIC; TOPICAL at 20:21

## 2023-10-20 RX ADMIN — MEMANTINE HYDROCHLORIDE 10 MILLIGRAM(S): 10 TABLET ORAL at 20:15

## 2023-10-20 RX ADMIN — MEMANTINE HYDROCHLORIDE 10 MILLIGRAM(S): 10 TABLET ORAL at 11:22

## 2023-10-20 RX ADMIN — DONEPEZIL HYDROCHLORIDE 10 MILLIGRAM(S): 10 TABLET, FILM COATED ORAL at 20:15

## 2023-10-20 RX ADMIN — ATORVASTATIN CALCIUM 40 MILLIGRAM(S): 80 TABLET, FILM COATED ORAL at 20:21

## 2023-10-20 RX ADMIN — NYSTATIN CREAM 1 APPLICATION(S): 100000 CREAM TOPICAL at 17:34

## 2023-10-20 RX ADMIN — Medication 1 TABLET(S): at 13:12

## 2023-10-20 RX ADMIN — CARVEDILOL PHOSPHATE 3.12 MILLIGRAM(S): 80 CAPSULE, EXTENDED RELEASE ORAL at 20:20

## 2023-10-20 RX ADMIN — Medication 3 MILLIGRAM(S): at 20:20

## 2023-10-20 RX ADMIN — Medication 40 MILLIEQUIVALENT(S): at 13:13

## 2023-10-20 RX ADMIN — Medication 50 MICROGRAM(S): at 05:41

## 2023-10-20 NOTE — PROGRESS NOTE ADULT - ASSESSMENT
84 y/o female with PMHx of dementia, chronic AFIB on coumadin, s/p PPM, HTN, HLD, glaucoma, hypothyroid who presented to  with CC of weakness, diarrhea.  Pt found to be + COVID.  Consulted for b/l pleural effusions     Plan   Pt stable on RA  Spoke with Alondra Valdez- HCP  this am - wants conservative mgmt   CXR today with minimal effusion   Recommend Lasix IF BP/ Creatinine allows   will sign off-  please reconsult if needed   DW Dr Smith

## 2023-10-20 NOTE — PROGRESS NOTE ADULT - SUBJECTIVE AND OBJECTIVE BOX
Subjective:    pat better, sitting in chair, CT surgery trial of lasix, no respiratory distress.    Home Medications:  atorvastatin 40 mg oral tablet: 1 tab(s) orally once a day (10 Oct 2023 14:45)  carvedilol 6.25 mg oral tablet: 0.5 tab(s) orally 2 times a day (10 Oct 2023 14:45)  digoxin 125 mcg (0.125 mg) oral tablet: 1 tab(s) orally every other day (10 Oct 2023 13:55)  donepezil 10 mg oral tablet: 1 tab(s) orally once a day (at bedtime) (10 Oct 2023 14:45)  furosemide 20 mg oral tablet: 1 tab(s) orally every other day (10 Oct 2023 13:55)  latanoprost 0.005% ophthalmic solution: 1 drop(s) in each eye once a day (in the evening) (10 Oct 2023 13:56)  lisinopril 5 mg oral tablet: 1 tab(s) orally once a day (10 Oct 2023 14:45)  memantine 10 mg oral tablet: 1 tab(s) orally 2 times a day (10 Oct 2023 14:45)  spironolactone 25 mg oral tablet: 1 tab(s) orally once a day (10 Oct 2023 14:45)  Synthroid 50 mcg (0.05 mg) oral tablet: 1 tab(s) orally once a day (in the morning) (10 Oct 2023 13:56)  Tylenol Extra Strength 500 mg oral tablet: 2 tab(s) orally 2 times a day as needed for hip pain (10 Oct 2023 14:45)  warfarin 4 mg oral tablet: 1 tab(s) orally x 2 days then 5 mg daily x 1 day ***pt on 3 day cycle 4mg, 4mg, 5mg*** (10 Oct 2023 14:45)  warfarin 5 mg oral tablet: 1 tab(s) orally ***pt on 3 day cycle 4mg, 4mg, 5mg*** (10 Oct 2023 14:45)    MEDICATIONS  (STANDING):  atorvastatin 40 milliGRAM(s) Oral at bedtime  carvedilol 3.125 milliGRAM(s) Oral every 12 hours  digoxin     Tablet 125 MICROGram(s) Oral every other day  donepezil 10 milliGRAM(s) Oral at bedtime  furosemide    Tablet 20 milliGRAM(s) Oral daily  influenza  Vaccine (HIGH DOSE) 0.7 milliLiter(s) IntraMuscular once  latanoprost 0.005% Ophthalmic Solution 1 Drop(s) Both EYES at bedtime  levothyroxine 50 MICROGram(s) Oral daily  lisinopril 5 milliGRAM(s) Oral daily  memantine 10 milliGRAM(s) Oral two times a day  multivitamin 1 Tablet(s) Oral daily  nystatin Powder 1 Application(s) Topical two times a day  potassium chloride   Powder 40 milliEquivalent(s) Oral once  QUEtiapine 12.5 milliGRAM(s) Oral at bedtime    MEDICATIONS  (PRN):  acetaminophen     Tablet .. 650 milliGRAM(s) Oral every 6 hours PRN Temp greater or equal to 38C (100.4F), Mild Pain (1 - 3)  aluminum hydroxide/magnesium hydroxide/simethicone Suspension 30 milliLiter(s) Oral every 4 hours PRN Dyspepsia  melatonin 3 milliGRAM(s) Oral at bedtime PRN Insomnia  ondansetron Injectable 4 milliGRAM(s) IV Push every 8 hours PRN Nausea and/or Vomiting  QUEtiapine 12.5 milliGRAM(s) Oral at bedtime PRN agitation      Allergies    No Known Allergies    Intolerances        Vital Signs Last 24 Hrs  T(C): 36.5 (20 Oct 2023 09:06), Max: 37.2 (19 Oct 2023 23:34)  T(F): 97.7 (20 Oct 2023 09:06), Max: 99 (19 Oct 2023 23:34)  HR: 78 (20 Oct 2023 12:49) (77 - 89)  BP: 110/63 (20 Oct 2023 12:49) (110/63 - 135/83)  BP(mean): --  RR: 18 (20 Oct 2023 09:06) (18 - 18)  SpO2: 97% (20 Oct 2023 12:49) (95% - 99%)    Parameters below as of 20 Oct 2023 12:49  Patient On (Oxygen Delivery Method): room air          PHYSICAL EXAMINATION:    NECK:  Supple. No lymphadenopathy. Jugular venous pressure not elevated. Carotids equal.   HEART:   The cardiac impulse has a normal quality. Reg., Nl S1 and S2.  There are no murmurs, rubs or gallops noted  CHEST:  Chest crackles to auscultation. Normal respiratory effort.  ABDOMEN:  Soft and nontender.   EXTREMITIES:  There is no edema.       LABS:    10-20    142  |  109<H>  |  8   ----------------------------<  94  3.3<L>   |  28  |  0.50    Ca    8.0<L>      20 Oct 2023 06:45    TPro  4.7<L>  /  Alb  1.9<L>  /  TBili  0.5  /  DBili  x   /  AST  15  /  ALT  22  /  AlkPhos  65  10-19    PT/INR - ( 20 Oct 2023 06:45 )   PT: 33.3 sec;   INR: 3.04 ratio         PTT - ( 19 Oct 2023 17:53 )  PTT:33.4 sec  Urinalysis Basic - ( 20 Oct 2023 06:45 )    Color: x / Appearance: x / SG: x / pH: x  Gluc: 94 mg/dL / Ketone: x  / Bili: x / Urobili: x   Blood: x / Protein: x / Nitrite: x   Leuk Esterase: x / RBC: x / WBC x   Sq Epi: x / Non Sq Epi: x / Bacteria: x

## 2023-10-20 NOTE — PROGRESS NOTE ADULT - SUBJECTIVE AND OBJECTIVE BOX
HOSPITALIST ATTENDING PROGRESS NOTE    Chart and meds reviewed.      Subjective: Patient seen and examined. Resting comfortably. Discussed wit niece in room. Family would like patient to continue to work with PT. Patient in chair and sitting. Tolerating clear liquid diet well. WBC mildly lower today to 17.80. Continue to monitor. If worsening consider repeat imaging. Continue IV abx. repeat covid test, negative. Abdomen continues to be soft with positive bowel sounds. Left arm swollen, Upper extremity US ordered.     10/19: no complaints   AMS, poor historian  b/l pleural effusions on CT chest    10/20: no complaints  K 3.3; replaced  INR 3.04  lasix 20 mg started for pleural effusion  cxr in am      PHYSICAL EXAM:    Vital Signs Last 24 Hrs  T(C): 36.9 (20 Oct 2023 16:10), Max: 37.2 (19 Oct 2023 23:34)  T(F): 98.4 (20 Oct 2023 16:10), Max: 99 (19 Oct 2023 23:34)  HR: 78 (20 Oct 2023 16:10) (78 - 89)  BP: 120/75 (20 Oct 2023 16:10) (110/63 - 135/83)  BP(mean): --  RR: 18 (20 Oct 2023 16:10) (18 - 18)  SpO2: 100% (20 Oct 2023 16:10) (95% - 100%)    Parameters below as of 20 Oct 2023 16:10  Patient On (Oxygen Delivery Method): room air        Constitutional: Weak  appearing  HEENT: Atraumatic, VIVIEN, Normal, No congestion  Respiratory: Breath Sounds normal, no rhonchi/wheeze  Cardiovascular: N S1S2;   Gastrointestinal: Abdomen soft, non tender, Bowel Sounds present  Extremities: No edema, peripheral pulses present  Neurological: AAO x 3, no gross focal motor deficits  Skin: Non cellulitic, no rash, ulcers  Lymph Nodes: No lymphadenopathy noted  Back: No CVA tenderness   Musculoskeletal: non tender  Breasts: Deferred  Genitourinary: deferred  Rectal: Deferred      All Labs/EKG/Radiology/Meds reviewed    Lab Results:  CBC    .		Differential:	[] Automated		[] Manual  Chemistry  10-20    142  |  109<H>  |  8   ----------------------------<  94  3.3<L>   |  28  |  0.50    Ca    8.0<L>      20 Oct 2023 06:45    TPro  4.7<L>  /  Alb  1.9<L>  /  TBili  0.5  /  DBili  x   /  AST  15  /  ALT  22  /  AlkPhos  65  10-19    LIVER FUNCTIONS - ( 19 Oct 2023 17:53 )  Alb: 1.9 g/dL / Pro: 4.7 gm/dL / ALK PHOS: 65 U/L / ALT: 22 U/L / AST: 15 U/L / GGT: x           PT/INR - ( 20 Oct 2023 06:45 )   PT: 33.3 sec;   INR: 3.04 ratio         PTT - ( 19 Oct 2023 17:53 )  PTT:33.4 sec  Urinalysis Basic - ( 20 Oct 2023 06:45 )    Color: x / Appearance: x / SG: x / pH: x  Gluc: 94 mg/dL / Ketone: x  / Bili: x / Urobili: x   Blood: x / Protein: x / Nitrite: x   Leuk Esterase: x / RBC: x / WBC x   Sq Epi: x / Non Sq Epi: x / Bacteria: x        MICROBIOLOGY/CULTURES:            All Labs/EKG/Radiology/Meds reviewed    Lab Results:  CBC  CBC Full  -  ( 18 Oct 2023 09:27 )  WBC Count : 14.20 K/uL  RBC Count : 4.10 M/uL  Hemoglobin : 12.8 g/dL  Hematocrit : 37.4 %  Platelet Count - Automated : 299 K/uL  Mean Cell Volume : 91.2 fl  Mean Cell Hemoglobin : 31.2 pg  Mean Cell Hemoglobin Concentration : 34.2 gm/dL  Auto Neutrophil # : 12.35 K/uL  Auto Lymphocyte # : 1.56 K/uL  Auto Monocyte # : 0.28 K/uL  Auto Eosinophil # : 0.00 K/uL  Auto Basophil # : 0.00 K/uL  Auto Neutrophil % : 87.0 %  Auto Lymphocyte % : 11.0 %  Auto Monocyte % : 2.0 %  Auto Eosinophil % : 0.0 %  Auto Basophil % : 0.0 %    .		Differential:	[] Automated		[] Manual  Chemistry  10-18    137  |  114<H>  |  2<L>  ----------------------------<  81  4.3   |  18<L>  |  0.41<L>    Ca    7.7<L>      18 Oct 2023 08:32        PT/INR - ( 18 Oct 2023 09:27 )   PT: 24.8 sec;   INR: 2.25 ratio           Urinalysis Basic - ( 18 Oct 2023 08:32 )    Color: x / Appearance: x / SG: x / pH: x  Gluc: 81 mg/dL / Ketone: x  / Bili: x / Urobili: x   Blood: x / Protein: x / Nitrite: x   Leuk Esterase: x / RBC: x / WBC x   Sq Epi: x / Non Sq Epi: x / Bacteria: x        MICROBIOLOGY/CULTURES:        MEDICATIONS  (STANDING):  atorvastatin 40 milliGRAM(s) Oral at bedtime  carvedilol 3.125 milliGRAM(s) Oral every 12 hours  digoxin     Tablet 125 MICROGram(s) Oral every other day  donepezil 10 milliGRAM(s) Oral at bedtime  furosemide    Tablet 20 milliGRAM(s) Oral daily  influenza  Vaccine (HIGH DOSE) 0.7 milliLiter(s) IntraMuscular once  latanoprost 0.005% Ophthalmic Solution 1 Drop(s) Both EYES at bedtime  levothyroxine 50 MICROGram(s) Oral daily  lisinopril 5 milliGRAM(s) Oral daily  memantine 10 milliGRAM(s) Oral two times a day  multivitamin 1 Tablet(s) Oral daily  nystatin Powder 1 Application(s) Topical two times a day  QUEtiapine 12.5 milliGRAM(s) Oral at bedtime    MEDICATIONS  (PRN):  acetaminophen     Tablet .. 650 milliGRAM(s) Oral every 6 hours PRN Temp greater or equal to 38C (100.4F), Mild Pain (1 - 3)  aluminum hydroxide/magnesium hydroxide/simethicone Suspension 30 milliLiter(s) Oral every 4 hours PRN Dyspepsia  melatonin 3 milliGRAM(s) Oral at bedtime PRN Insomnia  ondansetron Injectable 4 milliGRAM(s) IV Push every 8 hours PRN Nausea and/or Vomiting  QUEtiapine 12.5 milliGRAM(s) Oral at bedtime PRN agitation

## 2023-10-20 NOTE — PROGRESS NOTE ADULT - SUBJECTIVE AND OBJECTIVE BOX
Subjective: Pt in bed NAD. Confused     T(C): 36.5 (10-20-23 @ 09:06), Max: 37.2 (10-19-23 @ 23:34)  HR: 78 (10-20-23 @ 12:49) (77 - 89)  BP: 110/63 (10-20-23 @ 12:49) (110/63 - 135/83)  ABP: --  ABP(mean): --  RR: 18 (10-20-23 @ 09:06) (18 - 18)  SpO2: 97% (10-20-23 @ 12:49) (95% - 99%) RA   Wt(kg): --  CVP(mm Hg): --  CO: --  CI: --  PA: --                                              Tele: Afib             10-20    142  |  109<H>  |  8   ----------------------------<  94  3.3<L>   |  28  |  0.50    Ca    8.0<L>      20 Oct 2023 06:45    TPro  4.7<L>  /  Alb  1.9<L>  /  TBili  0.5  /  DBili  x   /  AST  15  /  ALT  22  /  AlkPhos  65  10-19              PT/INR - ( 20 Oct 2023 06:45 )   PT: 33.3 sec;   INR: 3.04 ratio         PTT - ( 19 Oct 2023 17:53 )  PTT:33.4 sec         CAPILLARY BLOOD GLUCOSE               CXR: minimal pleural effusions b/l         Exam   Neuro: confused , eyes open   Pulm: clear b/l decreased at bases   CV: Irreg   Abd: soft   Extremities: warm          Assessment:  83yFemale    with PAST MEDICAL & SURGICAL HISTORY:  Chronic atrial fibrillation      Hypertension, unspecified type      Hyperlipidemia, unspecified hyperlipidemia type      Pacemaker at end of battery life      Glaucoma of both eyes, unspecified glaucoma type      Chronic midline low back pain without sciatica      Hypothyroidism, unspecified type      Obesity, unspecified classification, unspecified obesity type, unspecified whether serious comorbidity present      S/P dilatation and curettage  1970s      Artificial cardiac pacemaker  2008            Plan:

## 2023-10-20 NOTE — PROGRESS NOTE ADULT - ASSESSMENT
84 y/o female with PMHx of dementia, chronic AFIB on coumadin, s/p PPM, HTN, HLD, glaucoma, hypothyroid who presented to  with CC of weakness, diarrhea and near syncope at home. Patient admitted for ischemic colitis, UTI, septic shock and COVID.      PROBLEMS:    S/P Septic Shock - Sources of infection UTI / COVID-19 / ischemic colitis  COVID  New moderate pleural effusions with atelectasis  SHANA:  resolved  HTN:    Hypothyroid:      PLAN:    pulmonary stable  CT surgery fu  C diff pcr negative-BCx negative   UCx with streptococcus, klebsiella S/P zosyn   Ischemic Colitis-surgical eval-- no plans for OR  chronic afib (on coumadin)   SHANA-resolved  COVID-SYMPTOMATIC RX  DVT Proph: coumadin

## 2023-10-21 LAB
ANION GAP SERPL CALC-SCNC: 2 MMOL/L — LOW (ref 5–17)
ANION GAP SERPL CALC-SCNC: 2 MMOL/L — LOW (ref 5–17)
BUN SERPL-MCNC: 9 MG/DL — SIGNIFICANT CHANGE UP (ref 7–23)
BUN SERPL-MCNC: 9 MG/DL — SIGNIFICANT CHANGE UP (ref 7–23)
CALCIUM SERPL-MCNC: 7.9 MG/DL — LOW (ref 8.5–10.1)
CALCIUM SERPL-MCNC: 7.9 MG/DL — LOW (ref 8.5–10.1)
CHLORIDE SERPL-SCNC: 111 MMOL/L — HIGH (ref 96–108)
CHLORIDE SERPL-SCNC: 111 MMOL/L — HIGH (ref 96–108)
CO2 SERPL-SCNC: 30 MMOL/L — SIGNIFICANT CHANGE UP (ref 22–31)
CO2 SERPL-SCNC: 30 MMOL/L — SIGNIFICANT CHANGE UP (ref 22–31)
CREAT SERPL-MCNC: 0.48 MG/DL — LOW (ref 0.5–1.3)
CREAT SERPL-MCNC: 0.48 MG/DL — LOW (ref 0.5–1.3)
EGFR: 94 ML/MIN/1.73M2 — SIGNIFICANT CHANGE UP
EGFR: 94 ML/MIN/1.73M2 — SIGNIFICANT CHANGE UP
GLUCOSE SERPL-MCNC: 96 MG/DL — SIGNIFICANT CHANGE UP (ref 70–99)
GLUCOSE SERPL-MCNC: 96 MG/DL — SIGNIFICANT CHANGE UP (ref 70–99)
HCT VFR BLD CALC: 32.8 % — LOW (ref 34.5–45)
HCT VFR BLD CALC: 32.8 % — LOW (ref 34.5–45)
HGB BLD-MCNC: 11.2 G/DL — LOW (ref 11.5–15.5)
HGB BLD-MCNC: 11.2 G/DL — LOW (ref 11.5–15.5)
INR BLD: 2.87 RATIO — HIGH (ref 0.85–1.18)
INR BLD: 2.87 RATIO — HIGH (ref 0.85–1.18)
MCHC RBC-ENTMCNC: 31 PG — SIGNIFICANT CHANGE UP (ref 27–34)
MCHC RBC-ENTMCNC: 31 PG — SIGNIFICANT CHANGE UP (ref 27–34)
MCHC RBC-ENTMCNC: 34.1 GM/DL — SIGNIFICANT CHANGE UP (ref 32–36)
MCHC RBC-ENTMCNC: 34.1 GM/DL — SIGNIFICANT CHANGE UP (ref 32–36)
MCV RBC AUTO: 90.9 FL — SIGNIFICANT CHANGE UP (ref 80–100)
MCV RBC AUTO: 90.9 FL — SIGNIFICANT CHANGE UP (ref 80–100)
PLATELET # BLD AUTO: 252 K/UL — SIGNIFICANT CHANGE UP (ref 150–400)
PLATELET # BLD AUTO: 252 K/UL — SIGNIFICANT CHANGE UP (ref 150–400)
POTASSIUM SERPL-MCNC: 3.5 MMOL/L — SIGNIFICANT CHANGE UP (ref 3.5–5.3)
POTASSIUM SERPL-MCNC: 3.5 MMOL/L — SIGNIFICANT CHANGE UP (ref 3.5–5.3)
POTASSIUM SERPL-SCNC: 3.5 MMOL/L — SIGNIFICANT CHANGE UP (ref 3.5–5.3)
POTASSIUM SERPL-SCNC: 3.5 MMOL/L — SIGNIFICANT CHANGE UP (ref 3.5–5.3)
PROTHROM AB SERPL-ACNC: 31.4 SEC — HIGH (ref 9.5–13)
PROTHROM AB SERPL-ACNC: 31.4 SEC — HIGH (ref 9.5–13)
RBC # BLD: 3.61 M/UL — LOW (ref 3.8–5.2)
RBC # BLD: 3.61 M/UL — LOW (ref 3.8–5.2)
RBC # FLD: 13.8 % — SIGNIFICANT CHANGE UP (ref 10.3–14.5)
RBC # FLD: 13.8 % — SIGNIFICANT CHANGE UP (ref 10.3–14.5)
SODIUM SERPL-SCNC: 143 MMOL/L — SIGNIFICANT CHANGE UP (ref 135–145)
SODIUM SERPL-SCNC: 143 MMOL/L — SIGNIFICANT CHANGE UP (ref 135–145)
WBC # BLD: 12.29 K/UL — HIGH (ref 3.8–10.5)
WBC # BLD: 12.29 K/UL — HIGH (ref 3.8–10.5)
WBC # FLD AUTO: 12.29 K/UL — HIGH (ref 3.8–10.5)
WBC # FLD AUTO: 12.29 K/UL — HIGH (ref 3.8–10.5)

## 2023-10-21 PROCEDURE — 71045 X-RAY EXAM CHEST 1 VIEW: CPT | Mod: 26

## 2023-10-21 PROCEDURE — 99233 SBSQ HOSP IP/OBS HIGH 50: CPT

## 2023-10-21 RX ORDER — WARFARIN SODIUM 2.5 MG/1
3 TABLET ORAL DAILY
Refills: 0 | Status: DISCONTINUED | OUTPATIENT
Start: 2023-10-21 | End: 2023-10-23

## 2023-10-21 RX ADMIN — Medication 1 TABLET(S): at 09:27

## 2023-10-21 RX ADMIN — MEMANTINE HYDROCHLORIDE 10 MILLIGRAM(S): 10 TABLET ORAL at 20:20

## 2023-10-21 RX ADMIN — LISINOPRIL 5 MILLIGRAM(S): 2.5 TABLET ORAL at 09:27

## 2023-10-21 RX ADMIN — CARVEDILOL PHOSPHATE 3.12 MILLIGRAM(S): 80 CAPSULE, EXTENDED RELEASE ORAL at 09:27

## 2023-10-21 RX ADMIN — Medication 125 MICROGRAM(S): at 09:27

## 2023-10-21 RX ADMIN — LATANOPROST 1 DROP(S): 0.05 SOLUTION/ DROPS OPHTHALMIC; TOPICAL at 20:21

## 2023-10-21 RX ADMIN — ATORVASTATIN CALCIUM 40 MILLIGRAM(S): 80 TABLET, FILM COATED ORAL at 20:20

## 2023-10-21 RX ADMIN — Medication 50 MICROGRAM(S): at 06:27

## 2023-10-21 RX ADMIN — NYSTATIN CREAM 1 APPLICATION(S): 100000 CREAM TOPICAL at 17:01

## 2023-10-21 RX ADMIN — Medication 20 MILLIGRAM(S): at 06:26

## 2023-10-21 RX ADMIN — QUETIAPINE FUMARATE 12.5 MILLIGRAM(S): 200 TABLET, FILM COATED ORAL at 20:21

## 2023-10-21 RX ADMIN — MEMANTINE HYDROCHLORIDE 10 MILLIGRAM(S): 10 TABLET ORAL at 09:27

## 2023-10-21 RX ADMIN — WARFARIN SODIUM 3 MILLIGRAM(S): 2.5 TABLET ORAL at 20:24

## 2023-10-21 RX ADMIN — Medication 3 MILLIGRAM(S): at 20:21

## 2023-10-21 RX ADMIN — CARVEDILOL PHOSPHATE 3.12 MILLIGRAM(S): 80 CAPSULE, EXTENDED RELEASE ORAL at 20:21

## 2023-10-21 RX ADMIN — DONEPEZIL HYDROCHLORIDE 10 MILLIGRAM(S): 10 TABLET, FILM COATED ORAL at 20:20

## 2023-10-21 RX ADMIN — NYSTATIN CREAM 1 APPLICATION(S): 100000 CREAM TOPICAL at 06:27

## 2023-10-21 NOTE — PROGRESS NOTE ADULT - ASSESSMENT
84 y/o female with PMHx of dementia, chronic AFIB on coumadin, s/p PPM, HTN, HLD, glaucoma, hypothyroid who presented to  with CC of weakness, diarrhea and near syncope at home. Patient admitted for ischemic colitis, UTI, septic shock and COVID.        # Septic Shock - Sources of infection UTI / COVID-19 / ischemic colitis.  treated and resolved  - CT abd/pelvis as above  completed zosyn  - C diff pcr negative , BCx negative   - UCx with streptococcus, klebsiella - both sensitive to zosyn   - BP stable   restarted home coreg + lisinopril   - Suspect shock more related to UTI/ ischemic colitis.    - lactate 3.1 -> 1.5 /  WBC trending down /  Na improved    # B/L Moderate Pleural Effusions: CT sx consult appreciated; no intervention  lasix 20 mg daily, could switch to q other day in 1-2 days      # Ischemic Colitis:  - Right colon on CT  - Appreciate surgical eval-- no plans for OR for now.    resolved    # Coagulopathy, supra therapeutic INR (POA)  # chronic afib (on coumadin)   - Secondary to coumadin   restarted coumadin on lower dose of 3 mg daily  INR in am    # SHANA:  resolved  - Suspect all prerenal.    - Patient was on lasix at home.    - s/p 2.5 L in ER.    - Cr 1.78 -> 0.56    #COVID +:    Patient with sx of weakness/ diarrhea but could also be explained by colitis/ UTI.    Trend.    No SOB/ cough/ fever.    Supportive care for now.    Repeat Covid test Neg    # Diarrhea:    - C diff negative    - May be related to ischemic colitis/ COVID.    resolved    # Dementia:    Cont aricept/ namenda.    CO on floors.    Seroquel PRN.      #HTN:    Hold  Lasix/ Spironolactone with hypotension.    - BP now rising off home meds, restarted home coreg + lisinopril     #Hypothyroid:    - Cont synthroid.      #HLD:    - Cont statin.      #DVT Proph: coumadin     #Advanced directives:  as above.  DNR/ DNI.  No pressors.  No central line.       DISPO: d/c planning to [facility]) ; INR in am    POC discussed in detail with p's niece and HCP , Kathy, team.

## 2023-10-21 NOTE — PROGRESS NOTE ADULT - ASSESSMENT
84 y/o female with PMHx of dementia, chronic AFIB on coumadin, s/p PPM, HTN, HLD, glaucoma, hypothyroid who presented to  with CC of weakness, diarrhea and near syncope at home. Patient admitted for ischemic colitis, UTI, septic shock and COVID.      PROBLEMS:    S/P Septic Shock - Sources of infection UTI / COVID-19 / ischemic colitis  COVID  New moderate pleural effusions with atelectasis  SHANA:  resolved  HTN:    Hypothyroid:      PLAN:    pulmonary stable  CT surgery fu-watch effusion Rx with diuretics  C diff pcr negative-BCx negative   UCx with streptococcus, klebsiella S/P zosyn   Ischemic Colitis-surgical eval-- no plans for OR  chronic afib (on coumadin)   SHANA-resolved  COVID-SYMPTOMATIC RX  DVT Proph: coumadin

## 2023-10-21 NOTE — PROGRESS NOTE ADULT - SUBJECTIVE AND OBJECTIVE BOX
HOSPITALIST ATTENDING PROGRESS NOTE    Chart and meds reviewed.      Subjective: Patient seen and examined. Resting comfortably. Discussed wit niece in room. Family would like patient to continue to work with PT. Patient in chair and sitting. Tolerating clear liquid diet well. WBC mildly lower today to 17.80. Continue to monitor. If worsening consider repeat imaging. Continue IV abx. repeat covid test, negative. Abdomen continues to be soft with positive bowel sounds. Left arm swollen, Upper extremity US ordered.     10/19: no complaints   AMS, poor historian  b/l pleural effusions on CT chest    10/20: no complaints  K 3.3; replaced  INR 3.04  lasix 20 mg started for pleural effusion  cxr in am    10/21: no complaints  cxr much improved  Cr stable  INR 2.87; warfarin restarted at a lower dose of 3 mg.   INR in am    PHYSICAL EXAM:    Vital Signs Last 24 Hrs  T(C): 37 (21 Oct 2023 08:41), Max: 37 (21 Oct 2023 08:41)  T(F): 98.6 (21 Oct 2023 08:41), Max: 98.6 (21 Oct 2023 08:41)  HR: 97 (21 Oct 2023 08:41) (75 - 97)  BP: 117/70 (21 Oct 2023 08:41) (117/70 - 135/55)  BP(mean): --  RR: 18 (21 Oct 2023 08:41) (18 - 18)  SpO2: 95% (21 Oct 2023 08:41) (95% - 100%)    Parameters below as of 21 Oct 2023 08:41  Patient On (Oxygen Delivery Method): room air      Constitutional: Weak  appearing  HEENT: Atraumatic, VIVIEN, Normal, No congestion  Respiratory: Breath Sounds normal, no rhonchi/wheeze  Cardiovascular: N S1S2;   Gastrointestinal: Abdomen soft, non tender, Bowel Sounds present  Extremities: No edema, peripheral pulses present  Neurological: AAO x 3, no gross focal motor deficits  Skin: Non cellulitic, no rash, ulcers  Lymph Nodes: No lymphadenopathy noted  Back: No CVA tenderness   Musculoskeletal: non tender  Breasts: Deferred  Genitourinary: deferred  Rectal: Deferred    All Labs/EKG/Radiology/Meds reviewed by me.     Lab Results:  CBC  CBC Full  -  ( 21 Oct 2023 07:16 )  WBC Count : 12.29 K/uL  RBC Count : 3.61 M/uL  Hemoglobin : 11.2 g/dL  Hematocrit : 32.8 %  Platelet Count - Automated : 252 K/uL  Mean Cell Volume : 90.9 fl  Mean Cell Hemoglobin : 31.0 pg  Mean Cell Hemoglobin Concentration : 34.1 gm/dL  Auto Neutrophil # : x  Auto Lymphocyte # : x  Auto Monocyte # : x  Auto Eosinophil # : x  Auto Basophil # : x  Auto Neutrophil % : x  Auto Lymphocyte % : x  Auto Monocyte % : x  Auto Eosinophil % : x  Auto Basophil % : x    .		Differential:	[] Automated		[] Manual  Chemistry                        11.2   12.29 )-----------( 252      ( 21 Oct 2023 07:16 )             32.8     10-21    143  |  111<H>  |  9   ----------------------------<  96  3.5   |  30  |  0.48<L>    Ca    7.9<L>      21 Oct 2023 07:16    TPro  4.7<L>  /  Alb  1.9<L>  /  TBili  0.5  /  DBili  x   /  AST  15  /  ALT  22  /  AlkPhos  65  10-19    LIVER FUNCTIONS - ( 19 Oct 2023 17:53 )  Alb: 1.9 g/dL / Pro: 4.7 gm/dL / ALK PHOS: 65 U/L / ALT: 22 U/L / AST: 15 U/L / GGT: x           PT/INR - ( 21 Oct 2023 07:16 )   PT: 31.4 sec;   INR: 2.87 ratio         PTT - ( 19 Oct 2023 17:53 )  PTT:33.4 sec  Urinalysis Basic - ( 21 Oct 2023 07:16 )    Color: x / Appearance: x / SG: x / pH: x  Gluc: 96 mg/dL / Ketone: x  / Bili: x / Urobili: x   Blood: x / Protein: x / Nitrite: x   Leuk Esterase: x / RBC: x / WBC x   Sq Epi: x / Non Sq Epi: x / Bacteria: x    RADIOLOGY RESULTS:    cxr 10/21: much improved    MEDICATIONS  (STANDING):  atorvastatin 40 milliGRAM(s) Oral at bedtime  carvedilol 3.125 milliGRAM(s) Oral every 12 hours  digoxin     Tablet 125 MICROGram(s) Oral every other day  donepezil 10 milliGRAM(s) Oral at bedtime  furosemide    Tablet 20 milliGRAM(s) Oral daily  influenza  Vaccine (HIGH DOSE) 0.7 milliLiter(s) IntraMuscular once  latanoprost 0.005% Ophthalmic Solution 1 Drop(s) Both EYES at bedtime  levothyroxine 50 MICROGram(s) Oral daily  lisinopril 5 milliGRAM(s) Oral daily  memantine 10 milliGRAM(s) Oral two times a day  multivitamin 1 Tablet(s) Oral daily  nystatin Powder 1 Application(s) Topical two times a day  QUEtiapine 12.5 milliGRAM(s) Oral at bedtime  warfarin 3 milliGRAM(s) Oral daily    MEDICATIONS  (PRN):  acetaminophen     Tablet .. 650 milliGRAM(s) Oral every 6 hours PRN Temp greater or equal to 38C (100.4F), Mild Pain (1 - 3)  aluminum hydroxide/magnesium hydroxide/simethicone Suspension 30 milliLiter(s) Oral every 4 hours PRN Dyspepsia  melatonin 3 milliGRAM(s) Oral at bedtime PRN Insomnia  ondansetron Injectable 4 milliGRAM(s) IV Push every 8 hours PRN Nausea and/or Vomiting  QUEtiapine 12.5 milliGRAM(s) Oral at bedtime PRN agitation

## 2023-10-21 NOTE — PROGRESS NOTE ADULT - SUBJECTIVE AND OBJECTIVE BOX
Subjective:    pat better, off isolation, family 2 bedside, CXR improving.    Home Medications:  atorvastatin 40 mg oral tablet: 1 tab(s) orally once a day (10 Oct 2023 14:45)  carvedilol 6.25 mg oral tablet: 0.5 tab(s) orally 2 times a day (10 Oct 2023 14:45)  digoxin 125 mcg (0.125 mg) oral tablet: 1 tab(s) orally every other day (10 Oct 2023 13:55)  donepezil 10 mg oral tablet: 1 tab(s) orally once a day (at bedtime) (10 Oct 2023 14:45)  furosemide 20 mg oral tablet: 1 tab(s) orally every other day (10 Oct 2023 13:55)  latanoprost 0.005% ophthalmic solution: 1 drop(s) in each eye once a day (in the evening) (10 Oct 2023 13:56)  lisinopril 5 mg oral tablet: 1 tab(s) orally once a day (10 Oct 2023 14:45)  memantine 10 mg oral tablet: 1 tab(s) orally 2 times a day (10 Oct 2023 14:45)  spironolactone 25 mg oral tablet: 1 tab(s) orally once a day (10 Oct 2023 14:45)  Synthroid 50 mcg (0.05 mg) oral tablet: 1 tab(s) orally once a day (in the morning) (10 Oct 2023 13:56)  Tylenol Extra Strength 500 mg oral tablet: 2 tab(s) orally 2 times a day as needed for hip pain (10 Oct 2023 14:45)  warfarin 4 mg oral tablet: 1 tab(s) orally x 2 days then 5 mg daily x 1 day ***pt on 3 day cycle 4mg, 4mg, 5mg*** (10 Oct 2023 14:45)  warfarin 5 mg oral tablet: 1 tab(s) orally ***pt on 3 day cycle 4mg, 4mg, 5mg*** (10 Oct 2023 14:45)    MEDICATIONS  (STANDING):  atorvastatin 40 milliGRAM(s) Oral at bedtime  carvedilol 3.125 milliGRAM(s) Oral every 12 hours  digoxin     Tablet 125 MICROGram(s) Oral every other day  donepezil 10 milliGRAM(s) Oral at bedtime  furosemide    Tablet 20 milliGRAM(s) Oral daily  influenza  Vaccine (HIGH DOSE) 0.7 milliLiter(s) IntraMuscular once  latanoprost 0.005% Ophthalmic Solution 1 Drop(s) Both EYES at bedtime  levothyroxine 50 MICROGram(s) Oral daily  lisinopril 5 milliGRAM(s) Oral daily  memantine 10 milliGRAM(s) Oral two times a day  multivitamin 1 Tablet(s) Oral daily  nystatin Powder 1 Application(s) Topical two times a day  QUEtiapine 12.5 milliGRAM(s) Oral at bedtime  warfarin 3 milliGRAM(s) Oral daily    MEDICATIONS  (PRN):  acetaminophen     Tablet .. 650 milliGRAM(s) Oral every 6 hours PRN Temp greater or equal to 38C (100.4F), Mild Pain (1 - 3)  aluminum hydroxide/magnesium hydroxide/simethicone Suspension 30 milliLiter(s) Oral every 4 hours PRN Dyspepsia  melatonin 3 milliGRAM(s) Oral at bedtime PRN Insomnia  ondansetron Injectable 4 milliGRAM(s) IV Push every 8 hours PRN Nausea and/or Vomiting  QUEtiapine 12.5 milliGRAM(s) Oral at bedtime PRN agitation      Allergies    No Known Allergies    Intolerances        Vital Signs Last 24 Hrs  T(C): 37 (21 Oct 2023 08:41), Max: 37 (21 Oct 2023 08:41)  T(F): 98.6 (21 Oct 2023 08:41), Max: 98.6 (21 Oct 2023 08:41)  HR: 97 (21 Oct 2023 08:41) (75 - 97)  BP: 117/70 (21 Oct 2023 08:41) (110/63 - 135/55)  BP(mean): --  RR: 18 (21 Oct 2023 08:41) (18 - 18)  SpO2: 95% (21 Oct 2023 08:41) (95% - 100%)    Parameters below as of 21 Oct 2023 08:41  Patient On (Oxygen Delivery Method): room air          PHYSICAL EXAMINATION:    NECK:  Supple. No lymphadenopathy. Jugular venous pressure not elevated. Carotids equal.   HEART:   The cardiac impulse has a normal quality. Reg., Nl S1 and S2.  There are no murmurs, rubs or gallops noted  CHEST:  Chest crackles to auscultation. Normal respiratory effort.  ABDOMEN:  Soft and nontender.   EXTREMITIES:  There is no edema.       LABS:                        11.2   12.29 )-----------( 252      ( 21 Oct 2023 07:16 )             32.8     10-21    143  |  111<H>  |  9   ----------------------------<  96  3.5   |  30  |  0.48<L>    Ca    7.9<L>      21 Oct 2023 07:16    TPro  4.7<L>  /  Alb  1.9<L>  /  TBili  0.5  /  DBili  x   /  AST  15  /  ALT  22  /  AlkPhos  65  10-19    PT/INR - ( 21 Oct 2023 07:16 )   PT: 31.4 sec;   INR: 2.87 ratio         PTT - ( 19 Oct 2023 17:53 )  PTT:33.4 sec  Urinalysis Basic - ( 21 Oct 2023 07:16 )    Color: x / Appearance: x / SG: x / pH: x  Gluc: 96 mg/dL / Ketone: x  / Bili: x / Urobili: x   Blood: x / Protein: x / Nitrite: x   Leuk Esterase: x / RBC: x / WBC x   Sq Epi: x / Non Sq Epi: x / Bacteria: x

## 2023-10-22 LAB
INR BLD: 2.77 RATIO — HIGH (ref 0.85–1.18)
INR BLD: 2.77 RATIO — HIGH (ref 0.85–1.18)
PROTHROM AB SERPL-ACNC: 30.4 SEC — HIGH (ref 9.5–13)
PROTHROM AB SERPL-ACNC: 30.4 SEC — HIGH (ref 9.5–13)

## 2023-10-22 PROCEDURE — 99233 SBSQ HOSP IP/OBS HIGH 50: CPT

## 2023-10-22 RX ADMIN — NYSTATIN CREAM 1 APPLICATION(S): 100000 CREAM TOPICAL at 05:50

## 2023-10-22 RX ADMIN — QUETIAPINE FUMARATE 12.5 MILLIGRAM(S): 200 TABLET, FILM COATED ORAL at 21:17

## 2023-10-22 RX ADMIN — Medication 3 MILLIGRAM(S): at 21:16

## 2023-10-22 RX ADMIN — DONEPEZIL HYDROCHLORIDE 10 MILLIGRAM(S): 10 TABLET, FILM COATED ORAL at 21:16

## 2023-10-22 RX ADMIN — CARVEDILOL PHOSPHATE 3.12 MILLIGRAM(S): 80 CAPSULE, EXTENDED RELEASE ORAL at 10:40

## 2023-10-22 RX ADMIN — CARVEDILOL PHOSPHATE 3.12 MILLIGRAM(S): 80 CAPSULE, EXTENDED RELEASE ORAL at 21:16

## 2023-10-22 RX ADMIN — ATORVASTATIN CALCIUM 40 MILLIGRAM(S): 80 TABLET, FILM COATED ORAL at 21:16

## 2023-10-22 RX ADMIN — WARFARIN SODIUM 3 MILLIGRAM(S): 2.5 TABLET ORAL at 21:16

## 2023-10-22 RX ADMIN — Medication 1 TABLET(S): at 10:39

## 2023-10-22 RX ADMIN — Medication 20 MILLIGRAM(S): at 05:48

## 2023-10-22 RX ADMIN — MEMANTINE HYDROCHLORIDE 10 MILLIGRAM(S): 10 TABLET ORAL at 21:16

## 2023-10-22 RX ADMIN — NYSTATIN CREAM 1 APPLICATION(S): 100000 CREAM TOPICAL at 17:49

## 2023-10-22 RX ADMIN — LATANOPROST 1 DROP(S): 0.05 SOLUTION/ DROPS OPHTHALMIC; TOPICAL at 21:18

## 2023-10-22 RX ADMIN — Medication 50 MICROGRAM(S): at 05:48

## 2023-10-22 RX ADMIN — MEMANTINE HYDROCHLORIDE 10 MILLIGRAM(S): 10 TABLET ORAL at 10:39

## 2023-10-22 RX ADMIN — LISINOPRIL 5 MILLIGRAM(S): 2.5 TABLET ORAL at 10:39

## 2023-10-22 NOTE — PROGRESS NOTE ADULT - SUBJECTIVE AND OBJECTIVE BOX
CC: weakness, diarrhea     HPI: 82 y/o female with PMHx of dementia, chronic AFIB on coumadin, s/p PPM, HTN, HLD, glaucoma, hypothyroidism  who presented to  with CC of weakness, diarrhea.    As per family Pt  wasn't eating as much, having some diarrhea.  She had her INR checked which was elevated  and her coumadin dosing was adjusted.  her sister tried getting her up OOB to clean her up after having diarrhea and she came weak and had near syncope and they lowered her to the ground.  No fall/ trauma.  They brought her to the Er for further evaluation.  In the ER, patinet found to have hypotension with inital SBP in 50's.  Elevated lactate 3.1.  Patient given 2.5 L NSS.  CT concerning for ischemic colitis-- right colon.  Surgery was consulted who recommended conservative management for now and close f/u exams.  Patient also was  tested positive for COVID--  bot symptomatic  other than weakness/ diarrhea.  Patient also found to have UTI with postiive UA.  Patient pancultured and started on cipro/ flagyl.  Also given 1 dose zosyn.        INTERVAL HPI/OVERNIGHT EVENTS: chart reviewed, Pt was seen and examined,  baseline confused, reports no pain, no SOB. As per RN no events     Vital Signs Last 24 Hrs  T(C): 36.6 (21 Oct 2023 23:10), Max: 37.3 (21 Oct 2023 15:47)  T(F): 97.9 (21 Oct 2023 23:10), Max: 99.1 (21 Oct 2023 15:47)  HR: 95 (21 Oct 2023 23:10) (80 - 95)  BP: 138/83 (21 Oct 2023 23:10) (136/90 - 138/83)  RR: 18 (21 Oct 2023 23:10) (18 - 18)  SpO2: 94% (21 Oct 2023 23:10) (94% - 94%)    Parameters below as of 21 Oct 2023 23:10  Patient On (Oxygen Delivery Method): room air      I&O's Detail    REVIEW OF SYSTEMS:  Unable to obtain due to baseline confusion   PHYSICAL EXAM:  General: frail elderly female,  in no acute distress  Eyes: P, EOMI; conjunctiva and sclera clear  Head: Normocephalic; atraumatic  ENMT: No nasal discharge; airway clear  Respiratory:  Decreased BS at bases, No wheezes, rales or rhonchi  Cardiovascular: Irregular rate and rhythm. S1 and S2 Normal;   Gastrointestinal: Soft non-tender non-distended; Normal bowel sounds, + umbilical hernia   Genitourinary: No  suprapubic  tenderness  Extremities: No  edema  Neurological: Alert and oriented x1, confused   Skin: Warm and dry. No acute rash  Musculoskeletal: Normal muscle tone, without deformities  Psychiatric: Cooperative    LABS:                         11.2   12.29 )-----------( 252      ( 21 Oct 2023 07:16 )             32.8     21 Oct 2023 07:16    143    |  111    |  9      ----------------------------<  96     3.5     |  30     |  0.48     Ca    7.9        21 Oct 2023 07:16      PT/INR - ( 22 Oct 2023 07:04 )   PT: 30.4 sec;   INR: 2.77 ratio      Culture - Urine (10.10.23 @ 11:03)   - Amikacin: S <=16  - Amoxicillin/Clavulanic Acid: S <=8/4  - Ampicillin: R >16 These ampicillin results predict results for amoxicillin  - Ampicillin: S <=2 Predicts results to ampicillin/sulbactam, amoxacillin-clavulanate and piperacillin-tazobactam.  - Ampicillin/Sulbactam: S <=4/2  - Aztreonam: S <=4  - Cefazolin: S <=2 For uncomplicated UTI with K. pneumoniae, E. coli, or P. mirablis: OTTO <=16 is sensitive and OTTO >=32 is resistant. This also predicts results for oral agents cefaclor, cefdinir, cefpodoxime, cefprozil, cefuroxime axetil, cephalexin and locarbef for uncomplicated UTI. Note that some isolates may be susceptible to these agents while testing resistant to cefazolin.  - Cefepime: S <=2  - Cefoxitin: S <=8  - Ceftriaxone: S <=1  - Cefuroxime: S <=4  - Ciprofloxacin: S <=0.25  - Ciprofloxacin: S <=1  - Ertapenem: S <=0.5  - Gentamicin: S <=2  - Imipenem: S <=1  - Levofloxacin: S <=0.5  - Levofloxacin: S <=0.5  - Meropenem: S <=1  - Nitrofurantoin: S <=32 Should not be used to treat pyelonephritis  - Nitrofurantoin: S <=32 Should not be used to treat pyelonephritis.  - Piperacillin/Tazobactam: S <=8  - Tetracycline: S <=1  - Tobramycin: S <=2  - Trimethoprim/Sulfamethoxazole: S <=0.5/9.5  - Vancomycin: S <=0.25  Specimen Source: Clean Catch Clean Catch (Midstream)  Culture Results:   >100,000 CFU/ml Klebsiella pneumoniae   Normal Urogenital funmi present  Organism Identification: Klebsiella pneumoniae   Streptococcus lutetiensis  Organism: Klebsiella pneumoniae  Organism: Streptococcus lutetiensis  Method Type: OTTO  Method Type: OTTO          MEDICATIONS  (STANDING):  atorvastatin 40 milliGRAM(s) Oral at bedtime  carvedilol 3.125 milliGRAM(s) Oral every 12 hours  digoxin     Tablet 125 MICROGram(s) Oral every other day  donepezil 10 milliGRAM(s) Oral at bedtime  furosemide    Tablet 20 milliGRAM(s) Oral daily  influenza  Vaccine (HIGH DOSE) 0.7 milliLiter(s) IntraMuscular once  latanoprost 0.005% Ophthalmic Solution 1 Drop(s) Both EYES at bedtime  levothyroxine 50 MICROGram(s) Oral daily  lisinopril 5 milliGRAM(s) Oral daily  memantine 10 milliGRAM(s) Oral two times a day  multivitamin 1 Tablet(s) Oral daily  nystatin Powder 1 Application(s) Topical two times a day  QUEtiapine 12.5 milliGRAM(s) Oral at bedtime  warfarin 3 milliGRAM(s) Oral daily    MEDICATIONS  (PRN):  acetaminophen     Tablet .. 650 milliGRAM(s) Oral every 6 hours PRN Temp greater or equal to 38C (100.4F), Mild Pain (1 - 3)  aluminum hydroxide/magnesium hydroxide/simethicone Suspension 30 milliLiter(s) Oral every 4 hours PRN Dyspepsia  melatonin 3 milliGRAM(s) Oral at bedtime PRN Insomnia  ondansetron Injectable 4 milliGRAM(s) IV Push every 8 hours PRN Nausea and/or Vomiting  QUEtiapine 12.5 milliGRAM(s) Oral at bedtime PRN agitation      RADIOLOGY & ADDITIONAL TESTS:      ACC: [id number] EXAM:  CT ABDOMEN AND PELVIS IC   ORDERED BY: KARIS THOMAS     ACC: [id number] EXAM:  CT ANGIO CHEST PULM ART WAWIC   ORDERED BY: KARIS THOMAS     PROCEDURE DATE:  10/17/2023          INTERPRETATION:  CTA CHEST AND CT ABDOMEN AND PELVIS    INDICATION: Elevated white blood cell count    TECHNIQUE: Helical images of the chest, abdomen, and pelvis were obtained   before and after the administration of 90 of Omnipaque 350. Maximum   intensity projection images were generated.    COMPARISON: CT chest chest, abdomen, and pelvis 10/10/2023    FINDINGS:    CT CHEST:    HEART/VASCULATURE: No pulmonary embolism. Dilated left atrium. Initial   nonenhancement of the left atrial appendage tip which fills in on the   venous phase, compatible with slow flow. No pericardial effusion.   Dual-chamber cardiac device. Normal caliber aorta.    LUNGS/AIRWAYS/PLEURA: No endobronchial lesion. New moderate pleural   effusions and associated passive atelectasis of the lower lobes.    LYMPH NODES/MEDIASTINUM: Multinodular thyroid. No lymphadenopathy.    BONES/SOFT TISSUES: Degenerative changes of the spine.        CT ABDOMEN/PELVIS:    LIVER: Multiple hepatic cysts.    BILIARY SYSTEM: Cholelithiasis. Decompressed gallbladder.    SPLEEN:Unremarkable.    PANCREAS: Unremarkable.    ADRENALS: Unremarkable.    KIDNEYS/URINARY TRACT: Symmetric size and enhancement of both kidneys. No   hydronephrosis. Decreased air within the bladder.    GASTROINTESTINAL TRACT: Interval decompression of the colon, now mostly   collapsed, and limiting assessment for wall thickening. Decreased   multiple dilated fluid-filled small bowel loops in the pelvis without   transition point. Short segment of bowel within an umbilical hernia.   Diverticulosis.    REPRODUCTIVE ORGANS: Calcified fibroid.    LYMPH NODES/PERITONEUM: Similar small volume ascites. No free air. No   lymphadenopathy.    VASCULATURE: Normal caliber aorta. Patent portal veins.    BONES/SOFT TISSUES: Anasarca. Degenerative changes of the spine.      IMPRESSION:    No pulmonary embolism.    New moderate pleural effusions.    Interval decompression of the colon which is now mostly collapsed. No   pneumatosis seen.    Decreased small bowel dilatation.  ACC: [id number] EXAM:  XR CHEST PORTABLE ROUTINE 1V   ORDERED BY: KATELYN ABRAHAM     PROCEDURE DATE:  10/21/2023          INTERPRETATION:  Exam:XR CHEST    clinical history:f/u pleural effusion    Increasing left basilar opacification likely representing a combination   of pulmonary consolidation and pleural fluid. Right basilar atelectasis.   No pneumothorax.    IMPRESSION: Progressive left basilar opacification as above

## 2023-10-22 NOTE — PROGRESS NOTE ADULT - ASSESSMENT
84 y/o female with PMHx of dementia, chronic AFIB on coumadin, s/p PPM, HTN, HLD, glaucoma, hypothyroid who presented to  with CC of weakness, diarrhea and near syncope at home. Patient admitted for ischemic colitis, UTI, septic shock and COVID.      PROBLEMS:    S/P Septic Shock - Sources of infection UTI / COVID-19 / ischemic colitis  COVID  New moderate pleural effusions with atelectasis  SHANA:  resolved  HTN:    Hypothyroid:      PLAN:    pulmonary stable-decd [place]  CT surgery fu-watch effusion Rx with diuretics  C diff pcr negative-BCx negative   UCx with streptococcus, klebsiella S/P zosyn   Ischemic Colitis-surgical eval-- no plans for OR  chronic afib (on coumadin)   SHANA-resolved  COVID-SYMPTOMATIC RX  DVT Proph: coumadin

## 2023-10-22 NOTE — PROGRESS NOTE ADULT - ASSESSMENT
82 y/o female with PMHx of dementia, chronic AFIB on coumadin, s/p PPM, HTN, HLD, glaucoma, hypothyroidism  admitted for:       # Septic Shock - Sources of infection UTI / COVID-19 / ischemic colitis.  Treated and resolved  - CT abd/pelvis as above  - completed zosyn 7days   - C diff pcr negative , BCx negative   - UCx with streptococcus, klebsiella - both sensitive to zosyn   - BP stable   - restarted home coreg + lisinopril   - Suspect shock more related to UTI/ ischemic colitis.    - lactate 3.1 -> 1.5 /  WBC trending down /  Na improved    # B/L Moderate Pleural Effusions: CT sx consult appreciated; no intervention  lasix 20 mg daily, monitor renal Fx, reeval in am       # Ischemic Colitis:  - Right colon on CT  - Appreciate surgical eval-- no plans for OR  - resolved    # Coagulopathy, supra therapeutic INR (POA)  # chronic afib (on coumadin)   - Secondary to coumadin   restarted coumadin on lower dose of 3 mg daily  INR Tx monitor     # SHANA:  resolved  - Suspect all prerenal.    - Patient was on lasix at home.    - s/p IVF    - Cr 1.78 -> 0.56    #COVID +:    Patient with sx of weakness/ diarrhea but could also be explained by colitis/ UTI.    Trend.    No SOB/ cough/ fever.    Supportive care for now.    Repeat Covid test Neg    # Diarrhea:    - C diff negative    - likely  related to ischemic colitis/ COVID.    resolved    # Dementia:    Cont aricept/ namenda.    CO on floors.    Seroquel PRN.      #HTN:    BP improved   Now tolerates lasix, restarted on carvedilol, lisinopril     #Hypothyroidism    - Cont synthroid.      #HLD:    - Cont statin.      #DVT Proph: coumadin     #Advanced directives:    DNR/ DNI.  No pressors.  No central line.       DISPO: d/c planning to [place] ( [place])

## 2023-10-22 NOTE — PROGRESS NOTE ADULT - SUBJECTIVE AND OBJECTIVE BOX
Subjective:    pat better, sitting in chair, no respiratory distress.    Home Medications:  atorvastatin 40 mg oral tablet: 1 tab(s) orally once a day (10 Oct 2023 14:45)  carvedilol 6.25 mg oral tablet: 0.5 tab(s) orally 2 times a day (10 Oct 2023 14:45)  digoxin 125 mcg (0.125 mg) oral tablet: 1 tab(s) orally every other day (10 Oct 2023 13:55)  donepezil 10 mg oral tablet: 1 tab(s) orally once a day (at bedtime) (10 Oct 2023 14:45)  furosemide 20 mg oral tablet: 1 tab(s) orally every other day (10 Oct 2023 13:55)  latanoprost 0.005% ophthalmic solution: 1 drop(s) in each eye once a day (in the evening) (10 Oct 2023 13:56)  lisinopril 5 mg oral tablet: 1 tab(s) orally once a day (10 Oct 2023 14:45)  memantine 10 mg oral tablet: 1 tab(s) orally 2 times a day (10 Oct 2023 14:45)  spironolactone 25 mg oral tablet: 1 tab(s) orally once a day (10 Oct 2023 14:45)  Synthroid 50 mcg (0.05 mg) oral tablet: 1 tab(s) orally once a day (in the morning) (10 Oct 2023 13:56)  Tylenol Extra Strength 500 mg oral tablet: 2 tab(s) orally 2 times a day as needed for hip pain (10 Oct 2023 14:45)  warfarin 4 mg oral tablet: 1 tab(s) orally x 2 days then 5 mg daily x 1 day ***pt on 3 day cycle 4mg, 4mg, 5mg*** (10 Oct 2023 14:45)  warfarin 5 mg oral tablet: 1 tab(s) orally ***pt on 3 day cycle 4mg, 4mg, 5mg*** (10 Oct 2023 14:45)    MEDICATIONS  (STANDING):  atorvastatin 40 milliGRAM(s) Oral at bedtime  carvedilol 3.125 milliGRAM(s) Oral every 12 hours  digoxin     Tablet 125 MICROGram(s) Oral every other day  donepezil 10 milliGRAM(s) Oral at bedtime  furosemide    Tablet 20 milliGRAM(s) Oral daily  influenza  Vaccine (HIGH DOSE) 0.7 milliLiter(s) IntraMuscular once  latanoprost 0.005% Ophthalmic Solution 1 Drop(s) Both EYES at bedtime  levothyroxine 50 MICROGram(s) Oral daily  lisinopril 5 milliGRAM(s) Oral daily  memantine 10 milliGRAM(s) Oral two times a day  multivitamin 1 Tablet(s) Oral daily  nystatin Powder 1 Application(s) Topical two times a day  QUEtiapine 12.5 milliGRAM(s) Oral at bedtime  warfarin 3 milliGRAM(s) Oral daily    MEDICATIONS  (PRN):  acetaminophen     Tablet .. 650 milliGRAM(s) Oral every 6 hours PRN Temp greater or equal to 38C (100.4F), Mild Pain (1 - 3)  aluminum hydroxide/magnesium hydroxide/simethicone Suspension 30 milliLiter(s) Oral every 4 hours PRN Dyspepsia  melatonin 3 milliGRAM(s) Oral at bedtime PRN Insomnia  ondansetron Injectable 4 milliGRAM(s) IV Push every 8 hours PRN Nausea and/or Vomiting  QUEtiapine 12.5 milliGRAM(s) Oral at bedtime PRN agitation      Allergies    No Known Allergies    Intolerances        Vital Signs Last 24 Hrs  T(C): 36.6 (21 Oct 2023 23:10), Max: 37.3 (21 Oct 2023 15:47)  T(F): 97.9 (21 Oct 2023 23:10), Max: 99.1 (21 Oct 2023 15:47)  HR: 95 (21 Oct 2023 23:10) (80 - 95)  BP: 138/83 (21 Oct 2023 23:10) (136/90 - 138/83)  BP(mean): --  RR: 18 (21 Oct 2023 23:10) (18 - 18)  SpO2: 94% (21 Oct 2023 23:10) (94% - 94%)    Parameters below as of 21 Oct 2023 23:10  Patient On (Oxygen Delivery Method): room air          PHYSICAL EXAMINATION:    NECK:  Supple. No lymphadenopathy. Jugular venous pressure not elevated. Carotids equal.   HEART:   The cardiac impulse has a normal quality. Reg., Nl S1 and S2.  There are no murmurs, rubs or gallops noted  CHEST:  Chest rhonchi to auscultation. Normal respiratory effort.  ABDOMEN:  Soft and nontender.   EXTREMITIES:  There is no edema.       LABS:                        11.2   12.29 )-----------( 252      ( 21 Oct 2023 07:16 )             32.8     10-21    143  |  111<H>  |  9   ----------------------------<  96  3.5   |  30  |  0.48<L>    Ca    7.9<L>      21 Oct 2023 07:16      PT/INR - ( 22 Oct 2023 07:04 )   PT: 30.4 sec;   INR: 2.77 ratio           Urinalysis Basic - ( 21 Oct 2023 07:16 )    Color: x / Appearance: x / SG: x / pH: x  Gluc: 96 mg/dL / Ketone: x  / Bili: x / Urobili: x   Blood: x / Protein: x / Nitrite: x   Leuk Esterase: x / RBC: x / WBC x   Sq Epi: x / Non Sq Epi: x / Bacteria: x

## 2023-10-23 ENCOUNTER — TRANSCRIPTION ENCOUNTER (OUTPATIENT)
Age: 83
End: 2023-10-23

## 2023-10-23 VITALS
TEMPERATURE: 99 F | OXYGEN SATURATION: 97 % | RESPIRATION RATE: 18 BRPM | DIASTOLIC BLOOD PRESSURE: 64 MMHG | HEART RATE: 91 BPM | SYSTOLIC BLOOD PRESSURE: 103 MMHG

## 2023-10-23 LAB
ANION GAP SERPL CALC-SCNC: 6 MMOL/L — SIGNIFICANT CHANGE UP (ref 5–17)
ANION GAP SERPL CALC-SCNC: 6 MMOL/L — SIGNIFICANT CHANGE UP (ref 5–17)
BUN SERPL-MCNC: 15 MG/DL — SIGNIFICANT CHANGE UP (ref 7–23)
BUN SERPL-MCNC: 15 MG/DL — SIGNIFICANT CHANGE UP (ref 7–23)
CALCIUM SERPL-MCNC: 7.8 MG/DL — LOW (ref 8.5–10.1)
CALCIUM SERPL-MCNC: 7.8 MG/DL — LOW (ref 8.5–10.1)
CHLORIDE SERPL-SCNC: 105 MMOL/L — SIGNIFICANT CHANGE UP (ref 96–108)
CHLORIDE SERPL-SCNC: 105 MMOL/L — SIGNIFICANT CHANGE UP (ref 96–108)
CO2 SERPL-SCNC: 28 MMOL/L — SIGNIFICANT CHANGE UP (ref 22–31)
CO2 SERPL-SCNC: 28 MMOL/L — SIGNIFICANT CHANGE UP (ref 22–31)
CREAT SERPL-MCNC: 0.47 MG/DL — LOW (ref 0.5–1.3)
CREAT SERPL-MCNC: 0.47 MG/DL — LOW (ref 0.5–1.3)
EGFR: 94 ML/MIN/1.73M2 — SIGNIFICANT CHANGE UP
EGFR: 94 ML/MIN/1.73M2 — SIGNIFICANT CHANGE UP
GLUCOSE SERPL-MCNC: 101 MG/DL — HIGH (ref 70–99)
GLUCOSE SERPL-MCNC: 101 MG/DL — HIGH (ref 70–99)
HCT VFR BLD CALC: 32.6 % — LOW (ref 34.5–45)
HCT VFR BLD CALC: 32.6 % — LOW (ref 34.5–45)
HGB BLD-MCNC: 10.8 G/DL — LOW (ref 11.5–15.5)
HGB BLD-MCNC: 10.8 G/DL — LOW (ref 11.5–15.5)
INR BLD: 3.04 RATIO — HIGH (ref 0.85–1.18)
INR BLD: 3.04 RATIO — HIGH (ref 0.85–1.18)
MCHC RBC-ENTMCNC: 30.6 PG — SIGNIFICANT CHANGE UP (ref 27–34)
MCHC RBC-ENTMCNC: 30.6 PG — SIGNIFICANT CHANGE UP (ref 27–34)
MCHC RBC-ENTMCNC: 33.1 GM/DL — SIGNIFICANT CHANGE UP (ref 32–36)
MCHC RBC-ENTMCNC: 33.1 GM/DL — SIGNIFICANT CHANGE UP (ref 32–36)
MCV RBC AUTO: 92.4 FL — SIGNIFICANT CHANGE UP (ref 80–100)
MCV RBC AUTO: 92.4 FL — SIGNIFICANT CHANGE UP (ref 80–100)
PLATELET # BLD AUTO: 243 K/UL — SIGNIFICANT CHANGE UP (ref 150–400)
PLATELET # BLD AUTO: 243 K/UL — SIGNIFICANT CHANGE UP (ref 150–400)
POTASSIUM SERPL-MCNC: 3.1 MMOL/L — LOW (ref 3.5–5.3)
POTASSIUM SERPL-MCNC: 3.1 MMOL/L — LOW (ref 3.5–5.3)
POTASSIUM SERPL-SCNC: 3.1 MMOL/L — LOW (ref 3.5–5.3)
POTASSIUM SERPL-SCNC: 3.1 MMOL/L — LOW (ref 3.5–5.3)
PROTHROM AB SERPL-ACNC: 33.3 SEC — HIGH (ref 9.5–13)
PROTHROM AB SERPL-ACNC: 33.3 SEC — HIGH (ref 9.5–13)
RBC # BLD: 3.53 M/UL — LOW (ref 3.8–5.2)
RBC # BLD: 3.53 M/UL — LOW (ref 3.8–5.2)
RBC # FLD: 13.8 % — SIGNIFICANT CHANGE UP (ref 10.3–14.5)
RBC # FLD: 13.8 % — SIGNIFICANT CHANGE UP (ref 10.3–14.5)
SODIUM SERPL-SCNC: 139 MMOL/L — SIGNIFICANT CHANGE UP (ref 135–145)
SODIUM SERPL-SCNC: 139 MMOL/L — SIGNIFICANT CHANGE UP (ref 135–145)
WBC # BLD: 8.31 K/UL — SIGNIFICANT CHANGE UP (ref 3.8–10.5)
WBC # BLD: 8.31 K/UL — SIGNIFICANT CHANGE UP (ref 3.8–10.5)
WBC # FLD AUTO: 8.31 K/UL — SIGNIFICANT CHANGE UP (ref 3.8–10.5)
WBC # FLD AUTO: 8.31 K/UL — SIGNIFICANT CHANGE UP (ref 3.8–10.5)

## 2023-10-23 PROCEDURE — 99238 HOSP IP/OBS DSCHRG MGMT 30/<: CPT

## 2023-10-23 PROCEDURE — 71045 X-RAY EXAM CHEST 1 VIEW: CPT | Mod: 26

## 2023-10-23 RX ORDER — POTASSIUM CHLORIDE 20 MEQ
40 PACKET (EA) ORAL EVERY 4 HOURS
Refills: 0 | Status: COMPLETED | OUTPATIENT
Start: 2023-10-23 | End: 2023-10-23

## 2023-10-23 RX ORDER — CARVEDILOL PHOSPHATE 80 MG/1
1 CAPSULE, EXTENDED RELEASE ORAL
Qty: 0 | Refills: 0 | DISCHARGE
Start: 2023-10-23

## 2023-10-23 RX ORDER — NYSTATIN CREAM 100000 [USP'U]/G
1 CREAM TOPICAL
Qty: 0 | Refills: 0 | DISCHARGE
Start: 2023-10-23

## 2023-10-23 RX ORDER — WARFARIN SODIUM 2.5 MG/1
1 TABLET ORAL
Refills: 0 | DISCHARGE

## 2023-10-23 RX ORDER — CARVEDILOL PHOSPHATE 80 MG/1
0.5 CAPSULE, EXTENDED RELEASE ORAL
Refills: 0 | DISCHARGE

## 2023-10-23 RX ORDER — QUETIAPINE FUMARATE 200 MG/1
0.5 TABLET, FILM COATED ORAL
Qty: 0 | Refills: 0 | DISCHARGE
Start: 2023-10-23

## 2023-10-23 RX ORDER — WARFARIN SODIUM 2.5 MG/1
1 TABLET ORAL
Qty: 0 | Refills: 0 | DISCHARGE
Start: 2023-10-23

## 2023-10-23 RX ORDER — LANOLIN ALCOHOL/MO/W.PET/CERES
1 CREAM (GRAM) TOPICAL
Qty: 0 | Refills: 0 | DISCHARGE
Start: 2023-10-23

## 2023-10-23 RX ADMIN — Medication 20 MILLIGRAM(S): at 06:00

## 2023-10-23 RX ADMIN — Medication 50 MICROGRAM(S): at 06:01

## 2023-10-23 RX ADMIN — Medication 40 MILLIEQUIVALENT(S): at 13:48

## 2023-10-23 RX ADMIN — Medication 1 TABLET(S): at 10:51

## 2023-10-23 RX ADMIN — NYSTATIN CREAM 1 APPLICATION(S): 100000 CREAM TOPICAL at 06:12

## 2023-10-23 RX ADMIN — LISINOPRIL 5 MILLIGRAM(S): 2.5 TABLET ORAL at 10:51

## 2023-10-23 RX ADMIN — CARVEDILOL PHOSPHATE 3.12 MILLIGRAM(S): 80 CAPSULE, EXTENDED RELEASE ORAL at 10:51

## 2023-10-23 RX ADMIN — Medication 40 MILLIEQUIVALENT(S): at 10:52

## 2023-10-23 RX ADMIN — MEMANTINE HYDROCHLORIDE 10 MILLIGRAM(S): 10 TABLET ORAL at 10:53

## 2023-10-23 RX ADMIN — Medication 125 MICROGRAM(S): at 10:50

## 2023-10-23 NOTE — DISCHARGE NOTE NURSING/CASE MANAGEMENT/SOCIAL WORK - PATIENT PORTAL LINK FT
You can access the FollowMyHealth Patient Portal offered by [facility] by registering at the following website: [email or web address]. By joining [facility]’s FollowMyHealth portal, you will also be able to view your health information using other applications (apps) compatible with our system.

## 2023-10-23 NOTE — PROGRESS NOTE ADULT - SUBJECTIVE AND OBJECTIVE BOX
84 y/o female with PMHx of dementia, chronic AFIB on coumadin, s/p PPM, HTN, HLD, glaucoma, hypothyroidism  who presented to  with CC of weakness, diarrhea.    As per family Pt  wasn't eating as much, having some diarrhea.  Pts  sister tried getting her up OOB to clean her up after having diarrhea and she became weak and had near syncope and they lowered her to the ground.  No fall/ trauma.    In the ER, patient found to have hypotension with initial SBP in 50's.  Elevated lactate 3.1.  Patient given 2.5 L NS.  CT concerning for ischemic colitis-- right colon.  Surgery was consulted who recommended conservative management.  Patient also was  tested positive for COVID--  but asymptomatic  other than weakness/ diarrhea.  Patient also found to have  positive UA suspected UTI.  Patient pancultured and started on cipro/ flagyl.  Also given 1 dose zosyn. Pts UCX + for Klebsiella and Streptococcus, Completed 7 days of Zosyn. Pts BP improved after aggressive IVF resuscitation. Hospital course notable for Moderate b/l pleural effusions. Likely due to IVF hydration. Pt was followed by Pulm and CT Sx, no thoracentesis was recommended, managed with Lasix  IV and then PO. CXR with improvement.  Pt also had elevated INR on admission, s/p Vit K. INR trended down, Pt restarted on Coumadin.   Today Pt awake and alert, baseline confused, oriented to self. Reports no pain, no difficulty breathing. Family at bedside, D.c planning and outPt f/u discussed     Vital Signs Last 24 Hrs  T(C): 36.9 (23 Oct 2023 08:54), Max: 37.5 (22 Oct 2023 20:35)  T(F): 98.4 (23 Oct 2023 08:54), Max: 99.5 (22 Oct 2023 20:35)  HR: 89 (23 Oct 2023 08:54) (78 - 93)  BP: 112/66 (23 Oct 2023 08:54) (102/61 - 136/79)  RR: 18 (23 Oct 2023 08:54) (18 - 18)  SpO2: 94% (23 Oct 2023 08:54) (94% - 99%)    REVIEW OF SYSTEMS:  Unable to obtain due to baseline confusion     PHYSICAL EXAM:  General: frail elderly female,  in no acute distress  Eyes: P, EOMI; conjunctiva and sclera clear  Head: Normocephalic; atraumatic  ENMT: No nasal discharge; airway clear  Respiratory:  Decreased BS at bases, No wheezes, rales or rhonchi  Cardiovascular: Irregular rate and rhythm. S1 and S2 Normal;   Gastrointestinal: Soft non-tender non-distended; Normal bowel sounds, + umbilical hernia   Genitourinary: No  suprapubic  tenderness  Extremities: No  edema  Neurological: Alert and oriented x1, confused   Skin: Warm and dry. No acute rash  Musculoskeletal: Normal muscle tone, without deformities  Psychiatric: Cooperative  LABS:                           10.8   8.31  )-----------( 243      ( 23 Oct 2023 07:31 )             32.6     10-23    139  |  105  |  15  ----------------------------<  101<H>  3.1<L>   |  28  |  0.47<L>    Ca    7.8<L>      23 Oct 2023 07:31    PT/INR - ( 23 Oct 2023 07:31 )   PT: 33.3 sec;   INR: 3.04 ratio        MEDICATIONS  (STANDING):  atorvastatin 40 milliGRAM(s) Oral at bedtime  carvedilol 3.125 milliGRAM(s) Oral every 12 hours  digoxin     Tablet 125 MICROGram(s) Oral every other day  donepezil 10 milliGRAM(s) Oral at bedtime  furosemide    Tablet 20 milliGRAM(s) Oral daily  influenza  Vaccine (HIGH DOSE) 0.7 milliLiter(s) IntraMuscular once  latanoprost 0.005% Ophthalmic Solution 1 Drop(s) Both EYES at bedtime  levothyroxine 50 MICROGram(s) Oral daily  lisinopril 5 milliGRAM(s) Oral daily  memantine 10 milliGRAM(s) Oral two times a day  multivitamin 1 Tablet(s) Oral daily  nystatin Powder 1 Application(s) Topical two times a day  potassium chloride    Tablet ER 40 milliEquivalent(s) Oral every 4 hours  QUEtiapine 12.5 milliGRAM(s) Oral at bedtime  warfarin 3 milliGRAM(s) Oral daily    MEDICATIONS  (PRN):  acetaminophen     Tablet .. 650 milliGRAM(s) Oral every 6 hours PRN Temp greater or equal to 38C (100.4F), Mild Pain (1 - 3)  aluminum hydroxide/magnesium hydroxide/simethicone Suspension 30 milliLiter(s) Oral every 4 hours PRN Dyspepsia  melatonin 3 milliGRAM(s) Oral at bedtime PRN Insomnia  ondansetron Injectable 4 milliGRAM(s) IV Push every 8 hours PRN Nausea and/or Vomiting  QUEtiapine 12.5 milliGRAM(s) Oral at bedtime PRN agitation          A/P: 84 y/o female with PMHx of dementia, chronic AFIB on coumadin, SSS s/p PPM,  non Obstructive CAD, HFrEF with restored EF, Non ischemic cardiomyopathy, HTN, HLD, glaucoma, hypothyroidism  admitted for:     # Septic Shock - Sources of infection UTI / COVID-19 / ischemic colitis.  Treated and resolved  - CT abd/pelvis as above  - completed zosyn 7days   - C diff pcr negative , BCx negative   - UCx with streptococcus, klebsiella - both sensitive to zosyn   - BP stable   - restarted home coreg + lisinopril   - Suspect shock more related to UTI/ ischemic colitis.    - lactate 3.1 -> 1.5 /  WBC trending down /  Na improved    # B/l Moderate pleural effusions, likely due to acute on Chronic HFrEF. In settings of  sepsis/IVF and malnutrition   as per outPt records reviewed Non ischemic Cardiomyopathy, non Obstructive CAD, restored  EF   On RA. S/p IV lasix 40mg x 1  Restarted on Lasix home dose 20mg QD, resume spironolactone at dc   C/w carvedilol and Lisinopril   CT sx consult appreciated; no intervention  Repeat CXR with improvement   F/u with Pulm Outpt     # Ischemic Colitis:  - Right colon on CT  - Appreciate surgical eval-- no plans for OR  - resolved    # Coagulopathy, supra therapeutic INR (POA). Chronic afib (on coumadin), SSS. S/p PPM   restarted coumadin on lower dose of 3 mg daily  INR today 3.04  C/w Coumdin 2.5mg QHS  repeat INR  in am     # SHANA:  resolved, likely prerenal and ATN due to sepsis  - s/p IVF , diuretics and lisinopril were on hold, now resumed   - Cr 1.78 -> 0.56  - Monitor renal Fx     #COVID +:    Patient with sx of weakness/ diarrhea but could also be explained by colitis/ UTI.    No SOB/ cough/ fever.    Repeat Covid test Neg    # Dementia:    Cont aricept/ namenda.     Seroquel PRN QHS      #HTN:    BP improved   Now tolerates lasix,  carvedilol, lisinopril , will resume spironolactone     #Hypothyroidism    - Cont synthroid.      # Severe Protein Calorie manuduction   C/w supplements, MVI    #Advanced directives:    DNR/ DNI.  No pressors.  No central line.     Dispo; stable for dc to [facility], fax d/c summary to PCP

## 2023-10-23 NOTE — PROGRESS NOTE ADULT - PROVIDER SPECIALTY LIST ADULT
Hospitalist
Pulmonology
Hospitalist
Thoracic Surgery
Gastroenterology
Hospitalist
Infectious Disease
Pulmonology
Hospitalist
Palliative Care
Pulmonology
Internal Medicine
Palliative Care

## 2023-10-23 NOTE — PROGRESS NOTE ADULT - SUBJECTIVE AND OBJECTIVE BOX
Subjective:    pat better, lying in bed, no new complaint.    Home Medications:  atorvastatin 40 mg oral tablet: 1 tab(s) orally once a day (10 Oct 2023 14:45)  carvedilol 6.25 mg oral tablet: 0.5 tab(s) orally 2 times a day (10 Oct 2023 14:45)  digoxin 125 mcg (0.125 mg) oral tablet: 1 tab(s) orally every other day (10 Oct 2023 13:55)  donepezil 10 mg oral tablet: 1 tab(s) orally once a day (at bedtime) (10 Oct 2023 14:45)  furosemide 20 mg oral tablet: 1 tab(s) orally every other day (10 Oct 2023 13:55)  latanoprost 0.005% ophthalmic solution: 1 drop(s) in each eye once a day (in the evening) (10 Oct 2023 13:56)  lisinopril 5 mg oral tablet: 1 tab(s) orally once a day (10 Oct 2023 14:45)  memantine 10 mg oral tablet: 1 tab(s) orally 2 times a day (10 Oct 2023 14:45)  spironolactone 25 mg oral tablet: 1 tab(s) orally once a day (10 Oct 2023 14:45)  Synthroid 50 mcg (0.05 mg) oral tablet: 1 tab(s) orally once a day (in the morning) (10 Oct 2023 13:56)  Tylenol Extra Strength 500 mg oral tablet: 2 tab(s) orally 2 times a day as needed for hip pain (10 Oct 2023 14:45)  warfarin 4 mg oral tablet: 1 tab(s) orally x 2 days then 5 mg daily x 1 day ***pt on 3 day cycle 4mg, 4mg, 5mg*** (10 Oct 2023 14:45)  warfarin 5 mg oral tablet: 1 tab(s) orally ***pt on 3 day cycle 4mg, 4mg, 5mg*** (10 Oct 2023 14:45)    MEDICATIONS  (STANDING):  atorvastatin 40 milliGRAM(s) Oral at bedtime  carvedilol 3.125 milliGRAM(s) Oral every 12 hours  digoxin     Tablet 125 MICROGram(s) Oral every other day  donepezil 10 milliGRAM(s) Oral at bedtime  furosemide    Tablet 20 milliGRAM(s) Oral daily  influenza  Vaccine (HIGH DOSE) 0.7 milliLiter(s) IntraMuscular once  latanoprost 0.005% Ophthalmic Solution 1 Drop(s) Both EYES at bedtime  levothyroxine 50 MICROGram(s) Oral daily  lisinopril 5 milliGRAM(s) Oral daily  memantine 10 milliGRAM(s) Oral two times a day  multivitamin 1 Tablet(s) Oral daily  nystatin Powder 1 Application(s) Topical two times a day  potassium chloride    Tablet ER 40 milliEquivalent(s) Oral every 4 hours  QUEtiapine 12.5 milliGRAM(s) Oral at bedtime  warfarin 3 milliGRAM(s) Oral daily    MEDICATIONS  (PRN):  acetaminophen     Tablet .. 650 milliGRAM(s) Oral every 6 hours PRN Temp greater or equal to 38C (100.4F), Mild Pain (1 - 3)  aluminum hydroxide/magnesium hydroxide/simethicone Suspension 30 milliLiter(s) Oral every 4 hours PRN Dyspepsia  melatonin 3 milliGRAM(s) Oral at bedtime PRN Insomnia  ondansetron Injectable 4 milliGRAM(s) IV Push every 8 hours PRN Nausea and/or Vomiting  QUEtiapine 12.5 milliGRAM(s) Oral at bedtime PRN agitation      Allergies    No Known Allergies    Intolerances        Vital Signs Last 24 Hrs  T(C): 36.9 (23 Oct 2023 08:54), Max: 37.5 (22 Oct 2023 20:35)  T(F): 98.4 (23 Oct 2023 08:54), Max: 99.5 (22 Oct 2023 20:35)  HR: 89 (23 Oct 2023 08:54) (78 - 93)  BP: 112/66 (23 Oct 2023 08:54) (102/61 - 136/79)  BP(mean): --  RR: 18 (23 Oct 2023 08:54) (18 - 18)  SpO2: 94% (23 Oct 2023 08:54) (94% - 99%)    Parameters below as of 23 Oct 2023 08:54  Patient On (Oxygen Delivery Method): room air          PHYSICAL EXAMINATION:    NECK:  Supple. No lymphadenopathy. Jugular venous pressure not elevated. Carotids equal.   HEART:   The cardiac impulse has a normal quality. Reg., Nl S1 and S2.  There are no murmurs, rubs or gallops noted  CHEST:  Chest cracklrs to auscultation. Normal respiratory effort.  ABDOMEN:  Soft and nontender.   EXTREMITIES:  There is no edema.       LABS:                        10.8   8.31  )-----------( 243      ( 23 Oct 2023 07:31 )             32.6     10-23    139  |  105  |  15  ----------------------------<  101<H>  3.1<L>   |  28  |  0.47<L>    Ca    7.8<L>      23 Oct 2023 07:31      PT/INR - ( 23 Oct 2023 07:31 )   PT: 33.3 sec;   INR: 3.04 ratio           Urinalysis Basic - ( 23 Oct 2023 07:31 )    Color: x / Appearance: x / SG: x / pH: x  Gluc: 101 mg/dL / Ketone: x  / Bili: x / Urobili: x   Blood: x / Protein: x / Nitrite: x   Leuk Esterase: x / RBC: x / WBC x   Sq Epi: x / Non Sq Epi: x / Bacteria: x

## 2023-10-23 NOTE — PROGRESS NOTE ADULT - REASON FOR ADMISSION
weakness, diarrhea

## 2023-10-30 DIAGNOSIS — I49.5 SICK SINUS SYNDROME: ICD-10-CM

## 2023-10-30 DIAGNOSIS — I50.23 ACUTE ON CHRONIC SYSTOLIC (CONGESTIVE) HEART FAILURE: ICD-10-CM

## 2023-10-30 DIAGNOSIS — E86.0 DEHYDRATION: ICD-10-CM

## 2023-10-30 DIAGNOSIS — Z28.9 IMMUNIZATION NOT CARRIED OUT FOR UNSPECIFIED REASON: ICD-10-CM

## 2023-10-30 DIAGNOSIS — U07.1 COVID-19: ICD-10-CM

## 2023-10-30 DIAGNOSIS — H40.9 UNSPECIFIED GLAUCOMA: ICD-10-CM

## 2023-10-30 DIAGNOSIS — R55 SYNCOPE AND COLLAPSE: ICD-10-CM

## 2023-10-30 DIAGNOSIS — E43 UNSPECIFIED SEVERE PROTEIN-CALORIE MALNUTRITION: ICD-10-CM

## 2023-10-30 DIAGNOSIS — I48.20 CHRONIC ATRIAL FIBRILLATION, UNSPECIFIED: ICD-10-CM

## 2023-10-30 DIAGNOSIS — I11.0 HYPERTENSIVE HEART DISEASE WITH HEART FAILURE: ICD-10-CM

## 2023-10-30 DIAGNOSIS — N17.0 ACUTE KIDNEY FAILURE WITH TUBULAR NECROSIS: ICD-10-CM

## 2023-10-30 DIAGNOSIS — Z95.0 PRESENCE OF CARDIAC PACEMAKER: ICD-10-CM

## 2023-10-30 DIAGNOSIS — I25.10 ATHEROSCLEROTIC HEART DISEASE OF NATIVE CORONARY ARTERY WITHOUT ANGINA PECTORIS: ICD-10-CM

## 2023-10-30 DIAGNOSIS — N39.0 URINARY TRACT INFECTION, SITE NOT SPECIFIED: ICD-10-CM

## 2023-10-30 DIAGNOSIS — F03.90 UNSPECIFIED DEMENTIA, UNSPECIFIED SEVERITY, WITHOUT BEHAVIORAL DISTURBANCE, PSYCHOTIC DISTURBANCE, MOOD DISTURBANCE, AND ANXIETY: ICD-10-CM

## 2023-10-30 DIAGNOSIS — E03.9 HYPOTHYROIDISM, UNSPECIFIED: ICD-10-CM

## 2023-10-30 DIAGNOSIS — F05 DELIRIUM DUE TO KNOWN PHYSIOLOGICAL CONDITION: ICD-10-CM

## 2023-10-30 DIAGNOSIS — B95.5 UNSPECIFIED STREPTOCOCCUS AS THE CAUSE OF DISEASES CLASSIFIED ELSEWHERE: ICD-10-CM

## 2023-10-30 DIAGNOSIS — K55.9 VASCULAR DISORDER OF INTESTINE, UNSPECIFIED: ICD-10-CM

## 2023-10-30 DIAGNOSIS — Z79.899 OTHER LONG TERM (CURRENT) DRUG THERAPY: ICD-10-CM

## 2023-10-30 DIAGNOSIS — Z79.890 HORMONE REPLACEMENT THERAPY: ICD-10-CM

## 2023-10-30 DIAGNOSIS — A41.89 OTHER SPECIFIED SEPSIS: ICD-10-CM

## 2023-10-30 DIAGNOSIS — B96.1 KLEBSIELLA PNEUMONIAE [K. PNEUMONIAE] AS THE CAUSE OF DISEASES CLASSIFIED ELSEWHERE: ICD-10-CM

## 2023-10-30 DIAGNOSIS — R65.21 SEVERE SEPSIS WITH SEPTIC SHOCK: ICD-10-CM

## 2023-10-30 DIAGNOSIS — A41.9 SEPSIS, UNSPECIFIED ORGANISM: ICD-10-CM

## 2023-10-30 DIAGNOSIS — G47.00 INSOMNIA, UNSPECIFIED: ICD-10-CM

## 2023-10-30 DIAGNOSIS — Z79.82 LONG TERM (CURRENT) USE OF ASPIRIN: ICD-10-CM

## 2023-10-30 DIAGNOSIS — Z66 DO NOT RESUSCITATE: ICD-10-CM

## 2023-10-30 DIAGNOSIS — I42.8 OTHER CARDIOMYOPATHIES: ICD-10-CM

## 2023-10-30 DIAGNOSIS — Z79.01 LONG TERM (CURRENT) USE OF ANTICOAGULANTS: ICD-10-CM

## 2023-10-30 DIAGNOSIS — J98.11 ATELECTASIS: ICD-10-CM

## 2023-10-30 DIAGNOSIS — E78.5 HYPERLIPIDEMIA, UNSPECIFIED: ICD-10-CM

## 2023-10-30 DIAGNOSIS — R79.1 ABNORMAL COAGULATION PROFILE: ICD-10-CM

## 2024-01-04 ENCOUNTER — NON-APPOINTMENT (OUTPATIENT)
Age: 84
End: 2024-01-04

## 2024-01-04 ENCOUNTER — APPOINTMENT (OUTPATIENT)
Dept: ELECTROPHYSIOLOGY | Facility: CLINIC | Age: 84
End: 2024-01-04
Payer: COMMERCIAL

## 2024-01-04 PROCEDURE — 93296 REM INTERROG EVL PM/IDS: CPT

## 2024-01-04 PROCEDURE — 93294 REM INTERROG EVL PM/LDLS PM: CPT

## 2024-02-14 NOTE — H&P PST ADULT - PSH
IP WOUND CONSULT    Carmen Harvey  MEDICAL RECORD NUMBER:  [id number]  AGE: 36 y.o.   GENDER: female  : 1987  TODAY'S DATE:  2024    GENERAL     [] Follow-up   [x] New Consult    Carmen Harvey is a 36 y.o. female referred by:   [x] Physician  [] Nursing  [] Other:         PAST MEDICAL HISTORY    Past Medical History:   Diagnosis Date    STD (female)     HX trich, GC, chlamydia, HPV        PAST SURGICAL HISTORY    No past surgical history on file.    FAMILY HISTORY    Family History   Problem Relation Age of Onset    Hypertension Maternal Grandmother          ALLERGIES    No Known Allergies    MEDICATIONS    No current facility-administered medications on file prior to encounter.     Current Outpatient Medications on File Prior to Encounter   Medication Sig Dispense Refill    sulfamethoxazole-trimethoprim (BACTRIM DS;SEPTRA DS) 800-160 MG per tablet Take 1 tablet by mouth 2 times daily (Patient not taking: Reported on 2024)      ibuprofen (ADVIL;MOTRIN) 800 MG tablet Take 1 tablet by mouth every 6 hours as needed for Pain      medroxyPROGESTERone (DEPO-PROVERA) 150 MG/ML injection INJECT 1 ML BY INTRAMUSCULAR ROUTE EVERY THREE (3) MONTHS. 1 mL 0          BP 94/63   Pulse 91   Temp 98.5 °F (36.9 °C) (Oral)   Resp 18   Ht 1.524 m (5')   Wt 69.9 kg (154 lb)   SpO2 99%   BMI 30.08 kg/m²     ASSESSMENT     Wound Identification & Type: furuncle/boil to LLE  Dressing change: cleaned with NS and applied lite foam  Verbal consent for picture: Yes    Contributing Factors: edema and anticoagulation therapy    Wound 24 Pretibial Left;Proximal dime size open draining purulent (Active)   Wound Image   24 0940   Wound Etiology Other 24 0940   Dressing Status New dressing applied 24 09   Wound Cleansed Cleansed with saline 24 09   Dressing/Treatment Foam 24 09   Dressing Change Due 02/15/24 02/14/24 09   Wound Length (cm) 1.5 cm 24 0940   Wound Width (cm)  Artificial cardiac pacemaker  2008  S/P dilatation and curettage  1970s

## 2024-04-04 ENCOUNTER — APPOINTMENT (OUTPATIENT)
Dept: ELECTROPHYSIOLOGY | Facility: CLINIC | Age: 84
End: 2024-04-04
Payer: MEDICARE

## 2024-04-04 ENCOUNTER — NON-APPOINTMENT (OUTPATIENT)
Age: 84
End: 2024-04-04

## 2024-04-04 PROCEDURE — 93294 REM INTERROG EVL PM/LDLS PM: CPT

## 2024-04-04 PROCEDURE — 93296 REM INTERROG EVL PM/IDS: CPT

## 2024-04-15 NOTE — H&P ADULT - PATIENT'S GENDER IDENTITY
Female
I have seen and examined this patient and fully participated in the care of this patient as the teaching attending.  The service was shared with the STEPHANIE.  I reviewed and verified the documentation.

## 2024-07-11 ENCOUNTER — APPOINTMENT (OUTPATIENT)
Dept: ELECTROPHYSIOLOGY | Facility: CLINIC | Age: 84
End: 2024-07-11
Payer: MEDICARE

## 2024-07-11 ENCOUNTER — NON-APPOINTMENT (OUTPATIENT)
Age: 84
End: 2024-07-11

## 2024-07-11 VITALS
OXYGEN SATURATION: 98 % | SYSTOLIC BLOOD PRESSURE: 101 MMHG | HEIGHT: 65 IN | DIASTOLIC BLOOD PRESSURE: 56 MMHG | HEART RATE: 60 BPM

## 2024-07-11 PROCEDURE — 93280 PM DEVICE PROGR EVAL DUAL: CPT

## 2024-07-11 RX ORDER — GLUCOSAMINE HCL/CHONDROITIN SU 500-400 MG
3 CAPSULE ORAL
Refills: 0 | Status: ACTIVE | COMMUNITY
Start: 2024-07-11

## 2024-07-11 RX ORDER — LATANOPROST/PF 0.005 %
0.01 DROPS OPHTHALMIC (EYE) DAILY
Refills: 0 | Status: ACTIVE | COMMUNITY
Start: 2024-07-11

## 2024-07-11 RX ORDER — MEMANTINE HYDROCHLORIDE 10 MG/1
10 TABLET, FILM COATED ORAL DAILY
Refills: 0 | Status: ACTIVE | COMMUNITY
Start: 2024-07-11

## 2024-07-11 RX ORDER — APIXABAN 5 MG/1
5 TABLET, FILM COATED ORAL
Qty: 180 | Refills: 2 | Status: ACTIVE | COMMUNITY
Start: 2024-07-11

## 2024-07-11 RX ORDER — FAMOTIDINE 20 MG/1
20 TABLET, FILM COATED ORAL DAILY
Refills: 0 | Status: ACTIVE | COMMUNITY
Start: 2024-07-11

## 2024-07-11 RX ORDER — DONEPEZIL HYDROCHLORIDE 10 MG/1
10 TABLET ORAL DAILY
Refills: 0 | Status: ACTIVE | COMMUNITY
Start: 2024-07-11

## 2024-09-24 NOTE — H&P PST ADULT - LYMPH NODES
PULMONARY CONSULT    Date of Service:  09/24/2024      REQUESTING:  Raquel Membreno DO    REASON FOR CONSULT:  Evaluation for chronic insomnia.    HISTORY OF PRESENT ILLNESS:  Ms. Vaz is a 67-year-old female, who is seen at the request of Raquel Membreno DO.    She reports a diagnosis of insomnia since 2008.  She was diagnosed with normal-pressure hydrocephalus, had chronic headaches and had a shunt placed in that year.  She has been on Lunesta 3 mg nightly and also takes Xanax 1 mg at night.  She denies anxiety disorder, but does report stress as it helps her relax.   committed suicide in January 2024.  This has caused significant grief.  She may feel tired during the day at times.  She has a history of snoring, but currently does not have a bed partner.  Apneas have not been reported.  There is no family history of sleep apnea.  She reports having overnight polysomnogram performed in [place] around 2009.  It demonstrated snoring but no sleep apnea.    She usually reads or goes on her phone and falls asleep around 11 p.m.  Wakes up at 9 a.m.  It takes her a few minutes to fall asleep.  Thereafter, she usually does not wake up in the middle of the night.  She denies being restless in bed.  No issues with leg kicking, sleep talking, or sleep walking.    In the morning, she is usually refreshed.  May have a headache, but no dry mouth.  Does not drink any coffee and has half a soda daily.  She does not drive.  Does not nap during the day.  Denies falling asleep if she is watching TV.  Denies any restless leg symptoms.  She has lost approximately 90 pounds in the last 5 years.    PAST MEDICAL HISTORY, SURGICAL HISTORY, FAMILY HISTORY, SOCIAL HISTORY, ALLERGIES, AND MEDICATIONS:  Reviewed and confirmed as per Epic Smart Chart.    REVIEW OF SYSTEMS:  As per HPI.  Weight loss, joint pain, arthritis of the hands.  All other systems reviewed and were negative.    PHYSICAL EXAMINATION:  VITAL SIGNS:  Blood pressure is  146/70, pulse of 73, respirations 16, saturation 98% on room air at rest.  Weight 171 pounds.  BMI is 28.84.  [place] is 0.  GENERAL:  Moderate build, in no acute distress.  HEENT:  Pupils equal, round, react to light.  Oropharynx is clean and moist.  Sclerae are anicteric.  Mallampati class 3.  Carlin score 4.  Multiple missing teeth.    NECK:  Does not show any evidence of thyromegaly or JVD.  LYMPHATIC:  No cervical or supraclavicular lymph nodes palpable.  LUNGS:  Symmetric expansion, easy respirations, clear to auscultation.  CARDIOVASCULAR:  S1, S2 are heard, regular rate and rhythm.  No murmur appreciable.  ABDOMEN:  Soft, nontender.  No hepatosplenomegaly.  MUSCULOSKELETAL:  No cyanosis, clubbing, or edema.  NEUROLOGIC:  Cranial nerves appear to be intact.  The patient is moving all 4 extremities equally.  Left lower extremity strength 3-5.  All other extremities within normal limits.  PSYCHIATRIC:  Oriented x3, appropriate mood.    REVIEW OF DATA:  Labs, 05/20/2024, shows normal basic chemistry panel except for elevated glucose.  CBC, 10/23/2023, within normal limits.    ASSESSMENT/PLAN:  1. Chronic insomnia:  Present since 2008 related to normal-pressure hydrocephalus and shunt placement.  She has significant stress and grief related to life events.  She denies anxiety, but continues to take Xanax 1 mg nightly to help relax her.  She is currently on chronic Lunesta 3 mg nightly.  Risks of taking both the medications together were explained to her.  Weaning 1 of the other medication initially would be important.  Given significant mental health issues, she may require psychology or psychiatry referral.  At this time, I would recommend that she proceed with CBT-I with sleep psychologist.  Initially she declined but eventually agrees to proceed and is willing to do Zoom meetings.  2. Sleep-disordered breathing:  Risks of untreated sleep apnea such as elevated cardiovascular events were explained to her.   Treatment for sleep apnea was discussed briefly.  I would recommend that she proceed with overnight polysomnogram.  She declines to proceed with any further testing at this time.  3. Mental health issues:  Would recommend further management under the guidance of primary care physician, grief counseling, behavior Health therapy referral if needed.  4. She will return to see me as needed.  Thank for the consultation.        Dictated By:  Bentley Carlin MD  Signing Provider:  MD LIVIA Tong/AQT  D:  09/24/2024 04:02:55 PM  T:  09/24/2024 04:25:10 PM  Job:  [id number]  CC:  Raquel Membreno DO     No lymphadedenopathy

## 2024-10-10 ENCOUNTER — APPOINTMENT (OUTPATIENT)
Dept: ELECTROPHYSIOLOGY | Facility: CLINIC | Age: 84
End: 2024-10-10

## 2024-10-10 PROCEDURE — 93296 REM INTERROG EVL PM/IDS: CPT

## 2024-10-10 PROCEDURE — 93294 REM INTERROG EVL PM/LDLS PM: CPT

## 2024-11-29 NOTE — ED ADULT TRIAGE NOTE - BANDS:
[facility]  Hospitalist Progress Note  Levy Byers MD  11/29/2024    Assessment & Plan   Shirley Hendricks is a 66 year old female, on Plavix, with a history of COPD, hypertension, CHF, peripheral artery disease, NSTEMI, stage 4 CKD, and tobacco use who is being admitted for evaluation of shortness of breath.      ## Acute hypoxic respiratory failure   ## Acute on chronic diastolic heart failure in exacerbation:  ## Moderate to severe left pleural effusion status post thoracentesis  ## Type II NSTEMI likely due to demand due to heart failure exacerbation  ## Community Acquired Pnuemonia   Presents with 2-day history of increasing shortness of breath and a cough.  On admission chest x-ray shows a large left pleural effusion with compressive atelectasis.  There is also diffuse interstitial opacities consistent with pulmonary edema.  No pneumothorax was noted.  Labs are pertinent for a proBNP of 26,004-20, mildly elevated point of 98, creatinine 1.71. PTA on Norvasc 10 mg daily, atenolol 25 mg daily, torsemide 50 mg daily, Jardiance 10 mg daily  - Ultrasound guided thoracentesis on 11/20/24 s/p .1 liters of straw-colored fluid were removed   - Repeat CT showed bilateral pleural effusions with thoracentesis 1.2 L left and 1.5 L right side.  - Duplex on 11/21/24 no DVT  - Lights criteria consistent with transudate.  Cultures negative  - Solu-Medrol changed to prednisone on 11/24 and completed for total of 5 days  - TTE done during this hospitalization on 11/22/2024 showed LVEF normal at 55 to 60%.  RV function normal.  Mild 1+ MR, 1+ TR.  Mild pulmonary hypertension.  Compared to prior study there is no significant change  -Completed treatment for community-acquired pneumonia with cefuroxime and azithromycin  -Continue Hydralazine  100 mg 4 times a day, ISMN 120 mg daily  -Monitor electrolytes and potassium while on diuresis  -Diuretic dosing as per cardiology.diuretic holiday 24-48  hours, -Right heart catheterization done on 11/27/24 showed normal pressures  -Continue DuoNebs  -Pulmonology consult appreciated  -Weaned off High flow on RA  -Apixaban 2.5mg bid     ## Hypertensive emergency  -See above manageemnt     ## New onset atrial fibrillation with RVR  -Converted -Sinus bradycardia  -Continue apixaban  -Continue carvedilol  -Pharmacy liaison consult on 11/25/2024-Xarelto/Eliquis $12  per month  -Carvedilol dose decreased due to bradycardia     ## Hypertension  ## Hyperlipidemia  ## Coronary artery disease with history of MI in 2019  ## Peripheral vascular disease with 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  Assessment: PTA on amlodipine 10 mg, Coreg changed to atenolol 25 mg daily, hydralazine 50 mg 3 times daily, Imdur 60 mg daily, Crestor 10 mg  Plan:  -See above for management of her hypertension       ## Mild hyponatremia  -Sodium improved from 1   -Continue diuresis    ## Acute kidney injury on CKD stage III  ## Suspected ATN secondary to hypotension and ARB  ## Borderline hyperkalemia  Assessment: Creatinine on admission of 1.71, which is within her baseline.1.2-1.4  Plan:  -Avoid NSAIDs/nephrotoxins  Creatinine improved to 1.89 and patient started on Bumex  -Nephrology consulted  -Nephrology ordered UA urine proteinuria, urine sodium, urea and creatinine  -Avoid ACE inhibitors and mineralocorticoid antagonist afebrile with history of right total hip  -Cr worsened to 2.67 > 3.41  -Hold diuresis and chlorthalidone and Valsartan  -Lokelma as per nephrology    ## History of COPD not in exacerbation  - Continue with Breo and as needed albuterol nebs available     ## GERD  - Continue with PTA famotidine     ## Anemia of Chronic Disease  Baseline hemoglobin has been between 8-9 over the past few months  Plan:  Hb 11.5 No active bleeding      ## History of thrombocytopenia  - Platelet count normal on admission     ## Tobacco use  - Continue with nicotine patch  - Counselled on cessation    "  ## Generalized weakness  -Physical therapy and Occupational Therapy        ## Constipation  Bowel regimen    Diet: Fluid restriction 1200 ML FLUID  Combination Diet Gluten Free Diet; 2 gm NA Diet    DVT Prophylaxis: DOAC  Alvarez Catheter: Not present  Lines: None     Cardiac Monitoring: ACTIVE order. Indication: Post- PCI/Angiogram (24 hours)  Code Status: Full Code          Clinically Significant Risk Factors         # Hyponatremia: Lowest Na = 132 mmol/L in last 2 days, will monitor as appropriate  # Hypochloremia: Lowest Cl = 92 mmol/L in last 2 days, will monitor as appropriate      # Hypoalbuminemia: Lowest albumin = 3 g/dL at 11/21/2024  4:42 AM, will monitor as appropriate     # Hypertension: Noted on problem list             # Cachexia: Estimated body mass index is 17.58 kg/m  as calculated from the following:    Height as of this encounter: 1.575 m (5' 2\").    Weight as of this encounter: 43.6 kg (96 lb 1.9 oz).   # Moderate Malnutrition: based on nutrition assessment    # Financial/Environmental Concerns: none       Disposition Plan     Medically Ready for Discharge:   In 3-5 days    Disposition:     Interval History   -- chart reviewed    -Data reviewed today: I reviewed all new labs and imaging over the last 24 hours. I personally reviewed no images or EKG's today.    Physical Exam    , Blood pressure (!) 142/68, pulse 55, temperature 97.8  F (36.6  C), temperature source Oral, resp. rate 17, height 1.575 m (5' 2\"), weight 50.8 kg (111 lb 15.9 oz), SpO2 93%, not currently breastfeeding.  Vitals:    11/27/24 0700 11/28/24 0626 11/29/24 0543   Weight: 47.5 kg (104 lb 11.5 oz) 43.6 kg (96 lb 1.9 oz) 50.8 kg (111 lb 15.9 oz)     Vital Signs with Ranges  Temp:  [97.8  F (36.6  C)-98.3  F (36.8  C)] 97.8  F (36.6  C)  Pulse:  [] 55  Resp:  [16-18] 17  BP: ()/(38-68) 142/68  SpO2:  [88 %-98 %] 93 %  I/O's Last 24 hours  I/O last 3 completed shifts:  In: 1380 [P.O.:1380]  Out: 30 " [Urine:30]    Constitutional: Awake, alert, cooperative, no apparent distress  Respiratory: Clear to auscultation bilaterally, no crackles or wheezing  Cardiovascular: Regular rate and rhythm, normal S1 and S2, and no murmur noted  GI: Normal bowel sounds, soft, non-distended, non-tender  Skin/Integumen:    Other:      Medications   All medications were reviewed.  Current Facility-Administered Medications   Medication Dose Route Frequency Provider Last Rate Last Admin    No lozenges or gum should be given while patient on BIPAP/AVAPS/AVAPS AE   Does not apply Continuous PRN Marcin White MD        Patient may continue current oral medications   Does not apply Continuous PRN Marcin White MD         Current Facility-Administered Medications   Medication Dose Route Frequency Provider Last Rate Last Admin    amLODIPine (NORVASC) tablet 10 mg  10 mg Oral Daily Yoli Huizar MD   10 mg at 11/29/24 0930    apixaban ANTICOAGULANT (ELIQUIS) tablet 2.5 mg  2.5 mg Oral BID Yoli Huizar MD   2.5 mg at 11/29/24 0951    carvedilol (COREG) tablet 12.5 mg  12.5 mg Oral BID w/meals Yoli Huizar MD   12.5 mg at 11/29/24 0933    [Held by provider] chlorthalidone (HYGROTON) tablet 25 mg  25 mg Oral Daily Lisa Zeng PA-C   25 mg at 11/28/24 0809    clopidogrel (PLAVIX) tablet 75 mg  75 mg Oral Daily Marcin White MD   75 mg at 11/28/24 0809    fluticasone (FLONASE) 50 MCG/ACT spray 1 spray  1 spray Both Nostrils Daily Liane Huertas MD   1 spray at 11/29/24 0934    fluticasone-vilanterol (BREO ELLIPTA) 200-25 MCG/ACT inhaler 1 puff  1 puff Inhalation Daily Marcin White MD   1 puff at 11/29/24 0934    gabapentin (NEURONTIN) capsule 200 mg  200 mg Oral At Bedtime Marcin White MD   200 mg at 11/28/24 2153    ipratropium - albuterol 0.5 mg/2.5 mg/3 mL (DUONEB) neb solution 3 mL  3 mL Nebulization 4x daily Eli Peraza MD   3 mL at 11/29/24 0833    isosorbide mononitrate (IMDUR)  24 hr tablet 120 mg  120 mg Oral Daily Yoli Huizar MD   120 mg at 11/29/24 0930    nicotine (NICODERM CQ) 21 MG/24HR 24 hr patch 1 patch  1 patch Transdermal Daily Carlos Hunter MD   1 patch at 11/29/24 0929    Followed by    [START ON 1/2/2025] nicotine (NICODERM CQ) 14 MG/24HR 24 hr patch 1 patch  1 patch Transdermal Daily Carlos Hunter MD        Followed by    [START ON 1/30/2025] nicotine (NICODERM CQ) 7 MG/24HR 24 hr patch 1 patch  1 patch Transdermal Daily Carlos Hunter MD        polyethylene glycol (MIRALAX) Packet 17 g  17 g Oral Daily Yoli Huizar MD   17 g at 11/29/24 0929    rosuvastatin (CRESTOR) tablet 10 mg  10 mg Oral At Bedtime Marcin White MD   10 mg at 11/28/24 2154    sodium chloride (PF) 0.9% PF flush 3 mL  3 mL Intracatheter Q8H Marcin White MD   3 mL at 11/29/24 0609    sodium zirconium cyclosilicate (LOKELMA) packet 10 g  10 g Oral TID Sean Loving DO        [Held by provider] valsartan (DIOVAN) tablet 40 mg  40 mg Oral Daily Lisa Zeng PA-C   40 mg at 11/28/24 0828        Data   Recent Labs   Lab 11/29/24  0650 11/28/24  0640 11/27/24  1045   WBC 10.4 8.8 7.6   HGB 10.9* 11.3* 11.2*   MCV 83 83 83    253 278   * 132* 133*   POTASSIUM 5.0 4.3 4.1   CHLORIDE 90* 92* 92*   CO2 21* 24 28   .0* 97.0* 86.4*   CR 3.41* 2.67* 1.92*   ANIONGAP 17* 16* 13   SONIA 7.4* 8.1* 8.6*   GLC 88 80 93       No results found for this or any previous visit (from the past 24 hours).    Levy Byers MD  Text Page  (7am to 6pm)        Fall Risk;

## 2025-01-13 ENCOUNTER — NON-APPOINTMENT (OUTPATIENT)
Age: 85
End: 2025-01-13

## 2025-01-13 ENCOUNTER — APPOINTMENT (OUTPATIENT)
Dept: ELECTROPHYSIOLOGY | Facility: CLINIC | Age: 85
End: 2025-01-13
Payer: MEDICARE

## 2025-01-13 PROCEDURE — 93294 REM INTERROG EVL PM/LDLS PM: CPT

## 2025-01-13 PROCEDURE — 93296 REM INTERROG EVL PM/IDS: CPT

## 2025-04-14 ENCOUNTER — NON-APPOINTMENT (OUTPATIENT)
Age: 85
End: 2025-04-14

## 2025-04-14 ENCOUNTER — APPOINTMENT (OUTPATIENT)
Dept: ELECTROPHYSIOLOGY | Facility: CLINIC | Age: 85
End: 2025-04-14
Payer: MEDICARE

## 2025-04-14 PROCEDURE — 93294 REM INTERROG EVL PM/LDLS PM: CPT

## 2025-04-14 PROCEDURE — 93296 REM INTERROG EVL PM/IDS: CPT

## 2025-07-11 ENCOUNTER — NON-APPOINTMENT (OUTPATIENT)
Age: 85
End: 2025-07-11

## 2025-07-11 ENCOUNTER — APPOINTMENT (OUTPATIENT)
Dept: ELECTROPHYSIOLOGY | Facility: CLINIC | Age: 85
End: 2025-07-11
Payer: MEDICARE

## 2025-07-11 VITALS
WEIGHT: 166 LBS | RESPIRATION RATE: 14 BRPM | SYSTOLIC BLOOD PRESSURE: 109 MMHG | HEART RATE: 66 BPM | DIASTOLIC BLOOD PRESSURE: 63 MMHG | OXYGEN SATURATION: 96 % | BODY MASS INDEX: 27.66 KG/M2 | HEIGHT: 65 IN

## 2025-07-11 PROCEDURE — 93280 PM DEVICE PROGR EVAL DUAL: CPT
